# Patient Record
Sex: FEMALE | Race: WHITE | NOT HISPANIC OR LATINO | Employment: PART TIME | URBAN - METROPOLITAN AREA
[De-identification: names, ages, dates, MRNs, and addresses within clinical notes are randomized per-mention and may not be internally consistent; named-entity substitution may affect disease eponyms.]

---

## 2017-09-08 ENCOUNTER — OFFICE VISIT (OUTPATIENT)
Dept: FAMILY MEDICINE | Facility: CLINIC | Age: 22
End: 2017-09-08
Payer: COMMERCIAL

## 2017-09-08 VITALS
TEMPERATURE: 98.5 F | SYSTOLIC BLOOD PRESSURE: 130 MMHG | DIASTOLIC BLOOD PRESSURE: 85 MMHG | HEIGHT: 61 IN | BODY MASS INDEX: 40.75 KG/M2 | HEART RATE: 100 BPM | WEIGHT: 215.8 LBS

## 2017-09-08 DIAGNOSIS — Z30.8 ENCOUNTER FOR OTHER CONTRACEPTIVE MANAGEMENT: ICD-10-CM

## 2017-09-08 DIAGNOSIS — Z00.01 ENCOUNTER FOR ROUTINE ADULT MEDICAL EXAM WITH ABNORMAL FINDINGS: Primary | ICD-10-CM

## 2017-09-08 DIAGNOSIS — Z23 NEED FOR PROPHYLACTIC VACCINATION AND INOCULATION AGAINST INFLUENZA: ICD-10-CM

## 2017-09-08 DIAGNOSIS — F32.5 MAJOR DEPRESSION IN COMPLETE REMISSION (H): ICD-10-CM

## 2017-09-08 DIAGNOSIS — Z11.3 SCREEN FOR STD (SEXUALLY TRANSMITTED DISEASE): ICD-10-CM

## 2017-09-08 DIAGNOSIS — G56.01 CARPAL TUNNEL SYNDROME OF RIGHT WRIST: ICD-10-CM

## 2017-09-08 DIAGNOSIS — F32.1 MODERATE MAJOR DEPRESSION (H): ICD-10-CM

## 2017-09-08 PROCEDURE — 99213 OFFICE O/P EST LOW 20 MIN: CPT | Mod: 25 | Performed by: FAMILY MEDICINE

## 2017-09-08 PROCEDURE — 87591 N.GONORRHOEAE DNA AMP PROB: CPT | Performed by: FAMILY MEDICINE

## 2017-09-08 PROCEDURE — 90686 IIV4 VACC NO PRSV 0.5 ML IM: CPT | Performed by: FAMILY MEDICINE

## 2017-09-08 PROCEDURE — 99395 PREV VISIT EST AGE 18-39: CPT | Mod: 25 | Performed by: FAMILY MEDICINE

## 2017-09-08 PROCEDURE — 90471 IMMUNIZATION ADMIN: CPT | Performed by: FAMILY MEDICINE

## 2017-09-08 PROCEDURE — 87491 CHLMYD TRACH DNA AMP PROBE: CPT | Performed by: FAMILY MEDICINE

## 2017-09-08 RX ORDER — NORGESTIMATE AND ETHINYL ESTRADIOL 0.25-0.035
1 KIT ORAL DAILY
Qty: 84 TABLET | Refills: 3 | Status: SHIPPED | OUTPATIENT
Start: 2017-09-08 | End: 2018-11-02

## 2017-09-08 ASSESSMENT — ANXIETY QUESTIONNAIRES
5. BEING SO RESTLESS THAT IT IS HARD TO SIT STILL: NOT AT ALL
GAD7 TOTAL SCORE: 0
3. WORRYING TOO MUCH ABOUT DIFFERENT THINGS: NOT AT ALL
6. BECOMING EASILY ANNOYED OR IRRITABLE: NOT AT ALL
7. FEELING AFRAID AS IF SOMETHING AWFUL MIGHT HAPPEN: NOT AT ALL
2. NOT BEING ABLE TO STOP OR CONTROL WORRYING: NOT AT ALL
1. FEELING NERVOUS, ANXIOUS, OR ON EDGE: NOT AT ALL

## 2017-09-08 ASSESSMENT — PATIENT HEALTH QUESTIONNAIRE - PHQ9
5. POOR APPETITE OR OVEREATING: NOT AT ALL
SUM OF ALL RESPONSES TO PHQ QUESTIONS 1-9: 0

## 2017-09-08 NOTE — PROGRESS NOTES
SUBJECTIVE:   CC: Saundra Urrutia is an 21 year old woman who presents for preventive health visit.     Healthy Habits:    Do you get at least three servings of calcium containing foods daily (dairy, green leafy vegetables, etc.)? yes    Amount of exercise or daily activities, outside of work: 3 day(s) per week. Walking with dogs and moving a lot at work     Problems taking medications regularly No    Medication side effects: No    Have you had an eye exam in the past two years? no    Do you see a dentist twice per year? no    Do you have sleep apnea, excessive snoring or daytime drowsiness?yes- snoring. No daytime sleepiness     Right wrist pain -   Arm and hand fall asleep at work   Worse when working a lot   Uses wrists a lot at work   Symptoms ongoing a week   Has a brace which helps at work  Symptoms occur at night too   Right handed     Depression - stopped taking celexa a year ago  Feeling good overall   No si/hi. No depression symptoms     PHQ-2- 0  Today's PHQ-2 Score: PHQ-2 ( 1999 Pfizer) 9/13/2013 11/8/2012   Q1: Little interest or pleasure in doing things 1 0   Q2: Feeling down, depressed or hopeless 2 0   PHQ-2 Score 3 0         Abuse: Current or Past(Physical, Sexual or Emotional)- No  Do you feel safe in your environment - Yes  Social History   Substance Use Topics     Smoking status: Former Smoker     Packs/day: 0.01     Types: Cigarettes     Smokeless tobacco: Never Used      Comment: uses E-cig     Alcohol use No     The patient does not drink >3 drinks per day nor >7 drinks per week.    Reviewed orders with patient.  Reviewed health maintenance and updated orders accordingly - Yes  Labs reviewed in EPIC    Pertinent mammograms are reviewed under the imaging tab.  History of abnormal Pap smear: NO - under age 21, PAP not appropriate for age    Reviewed and updated as needed this visit by clinical staff         Reviewed and updated as needed this visit by Provider              ROS:  C: NEGATIVE  "for fever, chills, change in weight  I: NEGATIVE for worrisome rashes, moles or lesions  E: NEGATIVE for vision changes or irritation  ENT: NEGATIVE for ear, mouth and throat problems  R: NEGATIVE for significant cough or SOB  B: NEGATIVE for masses, tenderness or discharge  CV: NEGATIVE for chest pain, palpitations or peripheral edema  GI: NEGATIVE for nausea, abdominal pain, heartburn, or change in bowel habits  : NEGATIVE for unusual urinary or vaginal symptoms. Periods are regular.  M: NEGATIVE for significant arthralgias or myalgia  N: NEGATIVE for weakness, dizziness or paresthesias  P: NEGATIVE for changes in mood or affect    OBJECTIVE:   /85 (BP Location: Right arm, Patient Position: Sitting, Cuff Size: Adult Large)  Pulse 100  Temp 98.5  F (36.9  C) (Tympanic)  Ht 5' 1.25\" (1.556 m)  Wt 215 lb 12.8 oz (97.9 kg)  LMP 08/30/2017 (Approximate)  Breastfeeding? No  BMI 40.44 kg/m2  EXAM:  GENERAL: healthy, alert and no distress  EYES: Eyes grossly normal to inspection, PERRL and conjunctivae and sclerae normal  HENT: ear canals and TM's normal, nose and mouth without ulcers or lesions  NECK: no adenopathy, no asymmetry, masses, or scars and thyroid normal to palpation  RESP: lungs clear to auscultation - no rales, rhonchi or wheezes  CV: regular rate and rhythm, normal S1 S2, no S3 or S4, no murmur, click or rub, no peripheral edema and peripheral pulses strong  ABDOMEN: soft, nontender, no hepatosplenomegaly, no masses and bowel sounds normal  MS: no gross musculoskeletal defects noted, no edema  NEURO: Normal strength and tone, mentation intact and speech normal  PSYCH: mentation appears normal, affect normal/bright  Positive tinel and phalen tests on right wrist. No atrophy or weakness     ASSESSMENT/PLAN:   (Z00.01) Encounter for routine adult medical exam with abnormal findings  (primary encounter diagnosis)  Comment: We discussed exercise 30mins/day, and calcium with vitamin D at " "1200mg/day, preferably from dietary sources.  Diet, Weight loss, and Exercise were discussed as well.   Plan:     (Z30.8) Encounter for other contraceptive management  Comment: med refilled   Plan: norgestimate-ethinyl estradiol (ORTHO-CYCLEN,         SPRINTEC) 0.25-35 MG-MCG per tablet            (Z11.3) Screen for STD (sexually transmitted disease)  Comment:   Plan: NEISSERIA GONORRHOEA PCR, CHLAMYDIA TRACHOMATIS        PCR            (F32.1) Moderate major depression (H)  Comment:   Plan: DEPRESSION ACTION PLAN (DAP)            (G56.01) Carpal tunnel syndrome of right wrist  Comment: discussed diagnosis and treatment. Will start with brace at work and at night. Ice before and after work. Let me know if symptoms persist/change/worsen. The patient indicates understanding of these issues and agrees with the plan.   Plan:     (Z23) Need for prophylactic vaccination and inoculation against influenza  Comment:   Plan: FLU VAC, SPLIT VIRUS IM > 3 YO (QUADRIVALENT)         [30769], Vaccine Administration, Initial         [28112]              COUNSELING:   Reviewed preventive health counseling, as reflected in patient instructions       Regular exercise       Healthy diet/nutrition    BP Screening:   Last 3 BP Readings:    BP Readings from Last 3 Encounters:   09/08/17 130/85   09/20/15 131/78   05/22/15 121/75       The following was recommended to the patient:  Re-screen BP within a year and recommended lifestyle modifications     reports that she has quit smoking. Her smoking use included Cigarettes. She smoked 0.01 packs per day. She has never used smokeless tobacco.    Estimated body mass index is 37.81 kg/(m^2) as calculated from the following:    Height as of 5/22/15: 5' 2.25\" (1.581 m).    Weight as of 5/22/15: 208 lb 6.4 oz (94.5 kg).   Weight management plan: Discussed healthy diet and exercise guidelines and patient will follow up in 12 months in clinic to re-evaluate.    Counseling Resources:  ATP IV " Guidelines  Pooled Cohorts Equation Calculator  Breast Cancer Risk Calculator  FRAX Risk Assessment  ICSI Preventive Guidelines  Dietary Guidelines for Americans, 2010  [place] MyPlate  ASA Prophylaxis  Lung CA Screening    Karla Wilson MD  [facility]

## 2017-09-08 NOTE — MR AVS SNAPSHOT
After Visit Summary   9/8/2017    Saundra Urrutia    MRN: [id number]           Patient Information     Date Of Birth          1995        Visit Information        Provider Department      9/8/2017 2:00 PM Karla Wilson MD [facility]        Today's Diagnoses     Encounter for routine adult medical exam with abnormal findings    -  1    Encounter for other contraceptive management        Screen for STD (sexually transmitted disease)        Moderate major depression (H)        Carpal tunnel syndrome of right wrist          Care Instructions      Preventive Health Recommendations  Female Ages 18 to 25     Yearly exam:     See your health care provider every year in order to  o Review health changes.   o Discuss preventive care.    o Review your medicines if your doctor has prescribed any.      You should be tested each year for STDs (sexually transmitted diseases).       After age 20, talk to your provider about how often you should have cholesterol testing.      Starting at age 21, get a Pap test every three years. If you have an abnormal result, your doctor may have you test more often.      If you are at risk for diabetes, you should have a diabetes test (fasting glucose).     Shots:     Get a flu shot each year.     Get a tetanus shot every 10 years.     Consider getting the shot (vaccine) that prevents cervical cancer (Gardasil).    Nutrition:     Eat at least 5 servings of fruits and vegetables each day.    Eat whole-grain bread, whole-wheat pasta and brown rice instead of white grains and rice.    Talk to your provider about Calcium and Vitamin D.     Lifestyle    Exercise at least 150 minutes a week each week (30 minutes a day, 5 days a week). This will help you control your weight and prevent disease.    Limit alcohol to one drink per day.    No smoking.     Wear sunscreen to prevent skin cancer.    See your dentist every six months for an exam and cleaning.           "Follow-ups after your visit        Who to contact     Normal or non-critical lab and imaging results will be communicated to you by MyChart, letter or phone within 4 business days after the clinic has received the results. If you do not hear from us within 7 days, please contact the clinic through MyChart or phone. If you have a critical or abnormal lab result, we will notify you by phone as soon as possible.  Submit refill requests through [employer] or call your pharmacy and they will forward the refill request to us. Please allow 3 business days for your refill to be completed.          If you need to speak with a  for additional information , please call: [phone]             Additional Information About Your Visit        [employer] Information     [employer] lets you send messages to your doctor, view your test results, renew your prescriptions, schedule appointments and more. To sign up, go to [email or web address] . Click on \"Log in\" on the left side of the screen, which will take you to the Welcome page. Then click on \"Sign up Now\" on the right side of the page.     You will be asked to enter the access code listed below, as well as some personal information. Please follow the directions to create your username and password.     Your access code is: [email or web address]  Expires: 2017  2:24 PM     Your access code will  in 90 days. If you need help or a new code, please call your [facility] clinic or [phone].        Care EveryWhere ID     This is your Care EveryWhere ID. This could be used by other organizations to access your [facility] medical records  [id number]        Your Vitals Were     Pulse Temperature Height Last Period Breastfeeding? BMI (Body Mass Index)    100 98.5  F (36.9  C) (Tympanic) 5' 1.25\" (1.556 m) 2017 (Approximate) No 40.44 kg/m2       Blood Pressure from Last 3 Encounters:   17 130/85   09/20/15 131/78   05/22/15 121/75    Weight from Last 3 Encounters: "   09/08/17 215 lb 12.8 oz (97.9 kg) (98 %)*   05/22/15 208 lb 6.4 oz (94.5 kg) (98 %)*   09/11/14 197 lb (89.4 kg) (98 %)*     * Growth percentiles are based on [facility] 2-20 Years data.              We Performed the Following     CHLAMYDIA TRACHOMATIS PCR     DEPRESSION ACTION PLAN (DAP)     NEISSERIA GONORRHOEA PCR          Today's Medication Changes          These changes are accurate as of: 9/8/17  2:27 PM.  If you have any questions, ask your nurse or doctor.               These medicines have changed or have updated prescriptions.        Dose/Directions    norgestimate-ethinyl estradiol 0.25-35 MG-MCG per tablet   Commonly known as:  ORTHO-CYCLEN, SPRINTEC   This may have changed:  additional instructions   Used for:  Encounter for other contraceptive management   Changed by:  Karla Wilson MD        Dose:  1 tablet   Take 1 tablet by mouth daily   Quantity:  84 tablet   Refills:  3         Stop taking these medicines if you haven't already. Please contact your care team if you have questions.     aluminum chloride 20 % external solution   Commonly known as:  DRYSOL   Stopped by:  Karla Wilson MD           citalopram 20 MG tablet   Commonly known as:  celeXA   Stopped by:  Karla Wilson MD           triamcinolone 0.1 % paste   Commonly known as:  KENALOG   Stopped by:  Karla Wilson MD                Where to get your medicines      These medications were sent to [facility] [address] [place] [address] [address]   [address] [facility] [address]     Phone:  [phone]     norgestimate-ethinyl estradiol 0.25-35 MG-MCG per tablet                Primary Care Provider Office Phone # Fax #    Karla Wilson [phone] [address] [address] [facility] [address]        Equal Access to Services     [facility] HEATHER AH: Alana Brown, waaxda luqadaha, qaybta kaalmada adeyeimyyacuate, chari wade. So [facility]  [phone].    ATENCIÓN: Si nallely baron, tiene a escalante disposición servicios gratuitos de asistencia lingüística. Lety jerry [phone].    We comply with applicable federal civil rights laws and [place] laws. We do not discriminate on the basis of race, color, national origin, age, disability sex, sexual orientation or gender identity.            Thank you!     Thank you for choosing [facility]  for your care. Our goal is always to provide you with excellent care. Hearing back from our patients is one way we can continue to improve our services. Please take a few minutes to complete the written survey that you may receive in the mail after your visit with us. Thank you!             Your Updated Medication List - Protect others around you: Learn how to safely use, store and throw away your medicines at www.disposemymeds.org.          This list is accurate as of: 9/8/17  2:27 PM.  Always use your most recent med list.                   Brand Name Dispense Instructions for use Diagnosis    norgestimate-ethinyl estradiol 0.25-35 MG-MCG per tablet    ORTHO-CYCLEN, SPRINTEC    84 tablet    Take 1 tablet by mouth daily    Encounter for other contraceptive management

## 2017-09-08 NOTE — NURSING NOTE
"Chief Complaint   Patient presents with     Physical       Initial /85 (BP Location: Right arm, Patient Position: Sitting, Cuff Size: Adult Large)  Pulse 100  Temp 98.5  F (36.9  C) (Tympanic)  Ht 5' 1.25\" (1.556 m)  Wt 215 lb 12.8 oz (97.9 kg)  LMP 08/30/2017 (Approximate)  Breastfeeding? No  BMI 40.44 kg/m2 Estimated body mass index is 40.44 kg/(m^2) as calculated from the following:    Height as of this encounter: 5' 1.25\" (1.556 m).    Weight as of this encounter: 215 lb 12.8 oz (97.9 kg).  Medication Reconciliation: complete   Claudia Fisher LPN    "

## 2017-09-08 NOTE — LETTER
My Depression Action Plan  Name: Saundra Urrutia   Date of Birth 1995  Date: 9/8/2017    My doctor: Karla Wilson   My clinic: [facility]  [id number] [place] [id number] [phone]          GREEN    ZONE   Good Control    What it looks like:     Things are going generally well. You have normal up s and down s. You may even feel depressed from time to time, but bad moods usually last less than a day.   What you need to do:  1. Continue to care for yourself (see self care plan)  2. Check your depression survival kit and update it as needed  3. Follow your physician s recommendations including any medication.  4. Do not stop taking medication unless you consult with your physician first.           YELLOW         ZONE Getting Worse    What it looks like:     Depression is starting to interfere with your life.     It may be hard to get out of bed; you may be starting to isolate yourself from others.    Symptoms of depression are starting to last most all day and this has happened for several days.     You may have suicidal thoughts but they are not constant.   What you need to do:     1. Call your care team, your response to treatment will improve if you keep your care team informed of your progress. Yellow periods are signs an adjustment may need to be made.     2. Continue your self-care, even if you have to fake it!    3. Talk to someone in your support network    4. Open up your depression survival kit           RED    ZONE Medical Alert - Get Help    What it looks like:     Depression is seriously interfering with your life.     You may experience these or other symptoms: You can t get out of bed most days, can t work or engage in other necessary activities, you have trouble taking care of basic hygiene, or basic responsibilities, thoughts of suicide or death that will not go away, self-injurious behavior.     What you need to do:  1. Call your care team and request  a same-day appointment. If they are not available (weekends or after hours) call your local crisis line, emergency room or 911.      Electronically signed by: Claudia Fisher, September 8, 2017    Depression Self Care Plan / Survival Kit    Self-Care for Depression  Here s the deal. Your body and mind are really not as separate as most people think.  What you do and think affects how you feel and how you feel influences what you do and think. This means if you do things that people who feel good do, it will help you feel better.  Sometimes this is all it takes.  There is also a place for medication and therapy depending on how severe your depression is, so be sure to consult with your medical provider and/ or Behavioral Health Consultant if your symptoms are worsening or not improving.     In order to better manage my stress, I will:    Exercise  Get some form of exercise, every day. This will help reduce pain and release endorphins, the  feel good  chemicals in your brain. This is almost as good as taking antidepressants!  This is not the same as joining a gym and then never going! (they count on that by the way ) It can be as simple as just going for a walk or doing some gardening, anything that will get you moving.      Hygiene   Maintain good hygiene (Get out of bed in the morning, Make your bed, Brush your teeth, Take a shower, and Get dressed like you were going to work, even if you are unemployed).  If your clothes don't fit try to get ones that do.    Diet  I will strive to eat foods that are good for me, drink plenty of water, and avoid excessive sugar, caffeine, alcohol, and other mood-altering substances.  Some foods that are helpful in depression are: complex carbohydrates, B vitamins, flaxseed, fish or fish oil, fresh fruits and vegetables.    Psychotherapy  I agree to participate in Individual Therapy (if recommended).    Medication  If prescribed medications, I agree to take them.  Missing doses can  result in serious side effects.  I understand that drinking alcohol, or other illicit drug use, may cause potential side effects.  I will not stop my medication abruptly without first discussing it with my provider.    Staying Connected With Others  I will stay in touch with my friends, family members, and my primary care provider/team.    Use your imagination  Be creative.  We all have a creative side; it doesn t matter if it s oil painting, sand castles, or mud pies! This will also kick up the endorphins.    Witness Beauty  (AKA stop and smell the roses) Take a look outside, even in mid-winter. Notice colors, textures. Watch the squirrels and birds.     Service to others  Be of service to others.  There is always someone else in need.  By helping others we can  get out of ourselves  and remember the really important things.  This also provides opportunities for practicing all the other parts of the program.    Humor  Laugh and be silly!  Adjust your TV habits for less news and crime-drama and more comedy.    Control your stress  Try breathing deep, massage therapy, biofeedback, and meditation. Find time to relax each day.     My support system    Clinic Contact:  Phone number:    Contact 1:  Phone number:    Contact 2:  Phone number:    [employer]/:  Phone number:    Therapist:  Phone number:    Local crisis center:    Phone number:    Other community support:  Phone number:

## 2017-09-08 NOTE — PROGRESS NOTES
Injectable Influenza Immunization Documentation    1.  Are you sick today? (Fever of 100.5 or higher on the day of the clinic)   No    2.  Have you ever had Guillain-Montgomery Syndrome within 6 weeks of an influenza vaccionation?  No    3. Do you have a life-threatening allergy to eggs?  No    4. Do you have a life-threatening allergy to a component of the vaccine? May include antibiotics, gelatin or latex.  No     5. Have you ever had a reaction to a dose of flu vaccine that needed immediate medical attention?  No     Form completed by AJ-LPN

## 2017-09-09 ASSESSMENT — ANXIETY QUESTIONNAIRES: GAD7 TOTAL SCORE: 0

## 2017-09-10 LAB
C TRACH DNA SPEC QL NAA+PROBE: NEGATIVE
N GONORRHOEA DNA SPEC QL NAA+PROBE: NEGATIVE
SPECIMEN SOURCE: NORMAL
SPECIMEN SOURCE: NORMAL

## 2018-08-23 ENCOUNTER — OFFICE VISIT (OUTPATIENT)
Dept: FAMILY MEDICINE | Facility: CLINIC | Age: 23
End: 2018-08-23
Payer: COMMERCIAL

## 2018-08-23 VITALS
SYSTOLIC BLOOD PRESSURE: 126 MMHG | HEIGHT: 62 IN | BODY MASS INDEX: 40.48 KG/M2 | DIASTOLIC BLOOD PRESSURE: 80 MMHG | HEART RATE: 86 BPM | TEMPERATURE: 96.6 F | WEIGHT: 220 LBS

## 2018-08-23 DIAGNOSIS — G56.03 BILATERAL CARPAL TUNNEL SYNDROME: ICD-10-CM

## 2018-08-23 DIAGNOSIS — L24.9 IRRITANT CONTACT DERMATITIS, UNSPECIFIED TRIGGER: ICD-10-CM

## 2018-08-23 DIAGNOSIS — Z11.3 SCREEN FOR STD (SEXUALLY TRANSMITTED DISEASE): Primary | ICD-10-CM

## 2018-08-23 PROCEDURE — 99214 OFFICE O/P EST MOD 30 MIN: CPT | Performed by: PHYSICIAN ASSISTANT

## 2018-08-23 PROCEDURE — 87491 CHLMYD TRACH DNA AMP PROBE: CPT | Performed by: PHYSICIAN ASSISTANT

## 2018-08-23 PROCEDURE — 87591 N.GONORRHOEAE DNA AMP PROB: CPT | Performed by: PHYSICIAN ASSISTANT

## 2018-08-23 RX ORDER — FLUOCINONIDE 0.5 MG/G
OINTMENT TOPICAL
Qty: 15 G | Refills: 1 | Status: SHIPPED | OUTPATIENT
Start: 2018-08-23 | End: 2018-12-19

## 2018-08-23 ASSESSMENT — PATIENT HEALTH QUESTIONNAIRE - PHQ9: 5. POOR APPETITE OR OVEREATING: NOT AT ALL

## 2018-08-23 ASSESSMENT — ANXIETY QUESTIONNAIRES
2. NOT BEING ABLE TO STOP OR CONTROL WORRYING: NOT AT ALL
1. FEELING NERVOUS, ANXIOUS, OR ON EDGE: SEVERAL DAYS
GAD7 TOTAL SCORE: 3
6. BECOMING EASILY ANNOYED OR IRRITABLE: SEVERAL DAYS
7. FEELING AFRAID AS IF SOMETHING AWFUL MIGHT HAPPEN: NOT AT ALL
3. WORRYING TOO MUCH ABOUT DIFFERENT THINGS: SEVERAL DAYS
5. BEING SO RESTLESS THAT IT IS HARD TO SIT STILL: NOT AT ALL

## 2018-08-23 NOTE — PROGRESS NOTES
"  SUBJECTIVE:   Saundra Urrutia is a 22 year old female who presents to clinic today for the following health issues:      Rash  Onset: past month     Description:   Location: Right foot (toes)   Character: flakey, red  Itching (Pruritis): YES    Progression of Symptoms:  worsening    Accompanying Signs & Symptoms:  Fever: no   Body aches or joint pain: no   Sore throat symptoms: no   Recent cold symptoms: no     History:   Previous similar rash: no     Precipitating factors:   Exposure to similar rash: no   New exposures: None   Recent travel: no     Alleviating factors:      Therapies Tried and outcome: Hydrocortisone cream      2.Wrist brace for left wrist    Patient notes a h/o bilateral carpal tunnel and would like a wrist brace to apply at night for the L wrist as well.      Problem list and histories reviewed & adjusted, as indicated.  Additional history: Just started wearing a new work shoe.     ROS:  Constitutional, HEENT, cardiovascular, pulmonary, gi and gu systems are negative, except as otherwise noted.    OBJECTIVE:     /80 (BP Location: Right arm, Patient Position: Sitting, Cuff Size: Adult Large)  Pulse 86  Temp 96.6  F (35.9  C) (Tympanic)  Ht 5' 1.75\" (1.568 m)  Wt 220 lb (99.8 kg)  BMI 40.57 kg/m2  Body mass index is 40.57 kg/(m^2).  GENERAL: healthy, alert and no distress  SKIN: early excoriated 1 cm patch with vesicular lesions on lateral R Hallux with resolving similar on 4th digit volar surface.    Diagnostic Test Results:  none     ASSESSMENT/PLAN:   1. Irritant contact dermatitis, unspecified trigger  - fluocinonide (LIDEX) 0.05 % ointment; Apply sparingly to affected area twice daily for 14 days.  Do not apply to face.  Dispense: 15 g; Refill: 1    2. Bilateral carpal tunnel syndrome  - order for DME; Equipment being ordered: Splint  Dispense: 1 Units; Refill: 0    3. Screen for STD (sexually transmitted disease)  - Neisseria gonorrhoeae PCR  - Chlamydia trachomatis PCR    Use " medication as directed.  Use brace at night while sleeping and during work activities.  Follow up if symptoms should persist, change or worsen.  Patient amenable to this follow up plan.     Gary Gustafson PA-C  [facility]

## 2018-08-23 NOTE — LETTER
August 28, 2018      Saundra Urrutia  [address] [facility]   [place] [address]        Dear ,    We are writing to inform you of your test results.    These are normal results.  Follow up as previously recommended.       Resulted Orders   Neisseria gonorrhoeae PCR   Result Value Ref Range    Specimen Descrip Urine     N Gonorrhea PCR Negative NEG^Negative   Chlamydia trachomatis PCR   Result Value Ref Range    Specimen Description Urine     Chlamydia Trachomatis PCR Negative NEG^Negative       If you have any questions or concerns, please call the clinic at the number listed above.       Sincerely,      Gary Gustafson PA-C/sc

## 2018-08-23 NOTE — LETTER
My Depression Action Plan  Name: Saundra Urrutia   Date of Birth 1995  Date: 8/23/2018    My doctor: Karla Wilson   My clinic: [facility]  [id number] [place] [address] [phone]          GREEN    ZONE   Good Control    What it looks like:     Things are going generally well. You have normal up s and down s. You may even feel depressed from time to time, but bad moods usually last less than a day.   What you need to do:  1. Continue to care for yourself (see self care plan)  2. Check your depression survival kit and update it as needed  3. Follow your physician s recommendations including any medication.  4. Do not stop taking medication unless you consult with your physician first.           YELLOW         ZONE Getting Worse    What it looks like:     Depression is starting to interfere with your life.     It may be hard to get out of bed; you may be starting to isolate yourself from others.    Symptoms of depression are starting to last most all day and this has happened for several days.     You may have suicidal thoughts but they are not constant.   What you need to do:     1. Call your care team, your response to treatment will improve if you keep your care team informed of your progress. Yellow periods are signs an adjustment may need to be made.     2. Continue your self-care, even if you have to fake it!    3. Talk to someone in your support network    4. Open up your depression survival kit           RED    ZONE Medical Alert - Get Help    What it looks like:     Depression is seriously interfering with your life.     You may experience these or other symptoms: You can t get out of bed most days, can t work or engage in other necessary activities, you have trouble taking care of basic hygiene, or basic responsibilities, thoughts of suicide or death that will not go away, self-injurious behavior.     What you need to do:  1. Call your care team and  request a same-day appointment. If they are not available (weekends or after hours) call your local crisis line, emergency room or 911.            Depression Self Care Plan / Survival Kit    Self-Care for Depression  Here s the deal. Your body and mind are really not as separate as most people think.  What you do and think affects how you feel and how you feel influences what you do and think. This means if you do things that people who feel good do, it will help you feel better.  Sometimes this is all it takes.  There is also a place for medication and therapy depending on how severe your depression is, so be sure to consult with your medical provider and/ or Behavioral Health Consultant if your symptoms are worsening or not improving.     In order to better manage my stress, I will:    Exercise  Get some form of exercise, every day. This will help reduce pain and release endorphins, the  feel good  chemicals in your brain. This is almost as good as taking antidepressants!  This is not the same as joining a gym and then never going! (they count on that by the way ) It can be as simple as just going for a walk or doing some gardening, anything that will get you moving.      Hygiene   Maintain good hygiene (Get out of bed in the morning, Make your bed, Brush your teeth, Take a shower, and Get dressed like you were going to work, even if you are unemployed).  If your clothes don't fit try to get ones that do.    Diet  I will strive to eat foods that are good for me, drink plenty of water, and avoid excessive sugar, caffeine, alcohol, and other mood-altering substances.  Some foods that are helpful in depression are: complex carbohydrates, B vitamins, flaxseed, fish or fish oil, fresh fruits and vegetables.    Psychotherapy  I agree to participate in Individual Therapy (if recommended).    Medication  If prescribed medications, I agree to take them.  Missing doses can result in serious side effects.  I understand that  drinking alcohol, or other illicit drug use, may cause potential side effects.  I will not stop my medication abruptly without first discussing it with my provider.    Staying Connected With Others  I will stay in touch with my friends, family members, and my primary care provider/team.    Use your imagination  Be creative.  We all have a creative side; it doesn t matter if it s oil painting, sand castles, or mud pies! This will also kick up the endorphins.    Witness Beauty  (AKA stop and smell the roses) Take a look outside, even in mid-winter. Notice colors, textures. Watch the squirrels and birds.     Service to others  Be of service to others.  There is always someone else in need.  By helping others we can  get out of ourselves  and remember the really important things.  This also provides opportunities for practicing all the other parts of the program.    Humor  Laugh and be silly!  Adjust your TV habits for less news and crime-drama and more comedy.    Control your stress  Try breathing deep, massage therapy, biofeedback, and meditation. Find time to relax each day.     My support system    Clinic Contact:  Phone number:    Contact 1:  Phone number:    Contact 2:  Phone number:    Restoration/:  Phone number:    Therapist:  Phone number:    Local crisis center:    Phone number:    Other community support:  Phone number:

## 2018-08-23 NOTE — MR AVS SNAPSHOT
"              After Visit Summary   8/23/2018    Saundra Urrutia    MRN: [id number]           Patient Information     Date Of Birth          1995        Visit Information        Provider Department      8/23/2018 2:40 PM Gary Gustafson PA-C [facility]         Follow-ups after your visit        Your next 10 appointments already scheduled     Aug 23, 2018  2:40 PM CDT   Office Visit with Gary Gustafson PA-C   [facility]    [address] [facility] [address] [phone]           Bring a current list of meds and any records pertaining to this visit. For Physicals, please bring immunization records and any forms needing to be filled out. Please arrive 10 minutes early to complete paperwork.              Who to contact     Normal or non-critical lab and imaging results will be communicated to you by Veosearchhart, letter or phone within 4 business days after the clinic has received the results. If you do not hear from us within 7 days, please contact the clinic through Veosearchhart or phone. If you have a critical or abnormal lab result, we will notify you by phone as soon as possible.  Submit refill requests through [email or web address] or call your pharmacy and they will forward the refill request to us. Please allow 3 business days for your refill to be completed.          If you need to speak with a  for additional information , please call: [phone]             Additional Information About Your Visit        [email or web address] Information     [email or web address] lets you send messages to your doctor, view your test results, renew your prescriptions, schedule appointments and more. To sign up, go to [email or web address] . Click on \"Log in\" on the left side of the screen, which will take you to the Welcome page. Then click on \"Sign up Now\" on the right side of the page.     You will be asked to enter the access code listed below, as well as some personal " information. Please follow the directions to create your username and password.     Your access code is: [email or web address]  Expires: 2018  1:16 PM     Your access code will  in 90 days. If you need help or a new code, please call your [facility] clinic or [phone].        Care EveryWhere ID     This is your Care EveryWhere ID. This could be used by other organizations to access your [facility] medical records  [id number]         Blood Pressure from Last 3 Encounters:   17 130/85   09/20/15 131/78   05/22/15 121/75    Weight from Last 3 Encounters:   17 215 lb 12.8 oz (97.9 kg) (98 %)*   05/22/15 208 lb 6.4 oz (94.5 kg) (98 %)*   14 197 lb (89.4 kg) (98 %)*     * Growth percentiles are based on [facility] 2-20 Years data.              Today, you had the following     No orders found for display       Primary Care Provider Office Phone # Fax #    Karla Wilson [phone] [address] [facility] [address]        Equal Access to Services     CARMEN ROMERO AH: Hadii sulema englando Soedie, waaxda luqadaha, qaybta kaalmada adeegyada, chari wade. So [facility] [phone].    ATENCIÓN: Si habla español, tiene a escalante disposición servicios gratuitos de asistencia lingüística. Llame al [phone].    We comply with applicable federal civil rights laws and [place] laws. We do not discriminate on the basis of race, color, national origin, age, disability, sex, sexual orientation, or gender identity.            Thank you!     Thank you for choosing [facility]  for your care. Our goal is always to provide you with excellent care. Hearing back from our patients is one way we can continue to improve our services. Please take a few minutes to complete the written survey that you may receive in the mail after your visit with us. Thank you!             Your Updated Medication List - Protect others around you: Learn how to safely use, store and  throw away your medicines at www.disposemymeds.org.          This list is accurate as of 8/23/18  1:16 PM.  Always use your most recent med list.                   Brand Name Dispense Instructions for use Diagnosis    norgestimate-ethinyl estradiol 0.25-35 MG-MCG per tablet    ORTHO-CYCLEN, SPRINTEC    84 tablet    Take 1 tablet by mouth daily    Encounter for other contraceptive management

## 2018-08-24 ASSESSMENT — ANXIETY QUESTIONNAIRES: GAD7 TOTAL SCORE: 3

## 2018-08-24 ASSESSMENT — PATIENT HEALTH QUESTIONNAIRE - PHQ9: SUM OF ALL RESPONSES TO PHQ QUESTIONS 1-9: 4

## 2018-11-02 ENCOUNTER — OFFICE VISIT (OUTPATIENT)
Dept: FAMILY MEDICINE | Facility: CLINIC | Age: 23
End: 2018-11-02
Payer: COMMERCIAL

## 2018-11-02 VITALS
SYSTOLIC BLOOD PRESSURE: 119 MMHG | HEART RATE: 86 BPM | HEIGHT: 62 IN | TEMPERATURE: 97.5 F | WEIGHT: 226 LBS | DIASTOLIC BLOOD PRESSURE: 72 MMHG | BODY MASS INDEX: 41.59 KG/M2

## 2018-11-02 DIAGNOSIS — F33.1 MAJOR DEPRESSIVE DISORDER, RECURRENT EPISODE, MODERATE (H): Primary | ICD-10-CM

## 2018-11-02 DIAGNOSIS — Z72.89 SELF-INJURIOUS BEHAVIOR: ICD-10-CM

## 2018-11-02 PROBLEM — F32.5 MAJOR DEPRESSION IN COMPLETE REMISSION (H): Status: RESOLVED | Noted: 2017-09-08 | Resolved: 2018-11-02

## 2018-11-02 PROCEDURE — 99214 OFFICE O/P EST MOD 30 MIN: CPT | Performed by: FAMILY MEDICINE

## 2018-11-02 RX ORDER — SERTRALINE HYDROCHLORIDE 25 MG/1
25 TABLET, FILM COATED ORAL DAILY
Qty: 60 TABLET | Refills: 1 | Status: SHIPPED | OUTPATIENT
Start: 2018-11-02 | End: 2019-02-27

## 2018-11-02 ASSESSMENT — ANXIETY QUESTIONNAIRES
1. FEELING NERVOUS, ANXIOUS, OR ON EDGE: SEVERAL DAYS
7. FEELING AFRAID AS IF SOMETHING AWFUL MIGHT HAPPEN: NOT AT ALL
GAD7 TOTAL SCORE: 9
2. NOT BEING ABLE TO STOP OR CONTROL WORRYING: SEVERAL DAYS
5. BEING SO RESTLESS THAT IT IS HARD TO SIT STILL: MORE THAN HALF THE DAYS
3. WORRYING TOO MUCH ABOUT DIFFERENT THINGS: MORE THAN HALF THE DAYS
6. BECOMING EASILY ANNOYED OR IRRITABLE: MORE THAN HALF THE DAYS

## 2018-11-02 ASSESSMENT — PATIENT HEALTH QUESTIONNAIRE - PHQ9
5. POOR APPETITE OR OVEREATING: SEVERAL DAYS
SUM OF ALL RESPONSES TO PHQ QUESTIONS 1-9: 12

## 2018-11-02 NOTE — MR AVS SNAPSHOT
After Visit Summary   11/2/2018    Saundra Urrutia    MRN: [id number]           Patient Information     Date Of Birth          1995        Visit Information        Provider Department      11/2/2018 2:30 PM Karla Wilson MD [facility]        Today's Diagnoses     Major depressive disorder, recurrent episode, moderate (H)    -  1    Self-injurious behavior           Follow-ups after your visit        Additional Services     MENTAL HEALTH REFERRAL  - Adult; Outpatient Treatment; Individual/Couples/Family/Group Therapy/Health Psychology; G: [facility] [phone]; We will contact you to schedule the appointment or please call with any questions       All scheduling is subject to the client's specific insurance plan & benefits, provider/location availability, and provider clinical specialities.  Please arrive 15 minutes early for your first appointment and bring your completed paperwork.    Please be aware that coverage of these services is subject to the terms and limitations of your health insurance plan.  Call member services at your health plan with any benefit or coverage questions.                            Who to contact     Normal or non-critical lab and imaging results will be communicated to you by Truckilyhart, letter or phone within 4 business days after the clinic has received the results. If you do not hear from us within 7 days, please contact the clinic through Exploretript or phone. If you have a critical or abnormal lab result, we will notify you by phone as soon as possible.  Submit refill requests through Phytel or call your pharmacy and they will forward the refill request to us. Please allow 3 business days for your refill to be completed.          If you need to speak with a  for additional information , please call: [phone]             Additional Information About Your Visit        Phytel Information     Phytel gives you  "secure access to your electronic health record. If you see a primary care provider, you can also send messages to your care team and make appointments. If you have questions, please call your primary care clinic.  If you do not have a primary care provider, please call [phone] and they will assist you.        Care EveryWhere ID     This is your Care EveryWhere ID. This could be used by other organizations to access your [facility] medical records  [id number]        Your Vitals Were     Pulse Temperature Height BMI (Body Mass Index)          86 97.5  F (36.4  C) 5' 1.75\" (1.568 m) 41.67 kg/m2         Blood Pressure from Last 3 Encounters:   11/02/18 119/72   08/23/18 126/80   09/08/17 130/85    Weight from Last 3 Encounters:   11/02/18 226 lb (102.5 kg)   08/23/18 220 lb (99.8 kg)   09/08/17 215 lb 12.8 oz (97.9 kg) (98 %)*     * Growth percentiles are based on [facility] 2-20 Years data.              We Performed the Following     MENTAL HEALTH REFERRAL  - Adult; Outpatient Treatment; Individual/Couples/Family/Group Therapy/Health Psychology; G: [facility] [phone]; We will contact you to schedule the appointment or please call with any questions          Today's Medication Changes          These changes are accurate as of 11/2/18  2:57 PM.  If you have any questions, ask your nurse or doctor.               Start taking these medicines.        Dose/Directions    sertraline 25 MG tablet   Commonly known as:  ZOLOFT   Used for:  Major depressive disorder, recurrent episode, moderate (H), Self-injurious behavior   Started by:  Karla Wilson MD        Dose:  25 mg   Take 1 tablet (25 mg) by mouth daily Take 1/2 tablet (25 mg) for 3-5 days, then increase to 1 tablet ( 50mg) for 3-5 days, then increase to 1.5 tablets ( 75mg) for 3-5 days, then increase to 2 tablets ( 100mg) and stay at this dose   Quantity:  60 tablet   Refills:  1            Where to get your medicines      Some of these " will need a paper prescription and others can be bought over the counter.  Ask your nurse if you have questions.     Bring a paper prescription for each of these medications     sertraline 25 MG tablet                Primary Care Provider Office Phone # Fax #    Karla Wilson [phone] [address] [facility] [address]        Equal Access to Services     [facility] : Hadii aad ku hadasho Soomaali, waaxda luqadaha, qaybta kaalmada adeegyada, waxay idiin hayaan adeeg khlucysae lakrysta . So [facility] [phone].    ATENCIÓN: Si habla español, tiene a escalante disposición servicios gratuitos de asistencia lingüística. Llame al [phone].    We comply with applicable federal civil rights laws and [place] laws. We do not discriminate on the basis of race, color, national origin, age, disability, sex, sexual orientation, or gender identity.            Thank you!     Thank you for choosing [facility]  for your care. Our goal is always to provide you with excellent care. Hearing back from our patients is one way we can continue to improve our services. Please take a few minutes to complete the written survey that you may receive in the mail after your visit with us. Thank you!             Your Updated Medication List - Protect others around you: Learn how to safely use, store and throw away your medicines at www.disposemymeds.org.          This list is accurate as of 11/2/18  2:57 PM.  Always use your most recent med list.                   Brand Name Dispense Instructions for use Diagnosis    fluocinonide 0.05 % ointment    LIDEX    15 g    Apply sparingly to affected area twice daily for 14 days.  Do not apply to face.    Irritant contact dermatitis, unspecified trigger       norgestimate-ethinyl estradiol 0.25-35 MG-MCG per tablet    ORTHO-CYCLEN, SPRINTEC    84 tablet    Take 1 tablet by mouth daily    Encounter for other contraceptive management       order for DME     1 Units     Equipment being ordered: Splint    Bilateral carpal tunnel syndrome       sertraline 25 MG tablet    ZOLOFT    60 tablet    Take 1 tablet (25 mg) by mouth daily Take 1/2 tablet (25 mg) for 3-5 days, then increase to 1 tablet ( 50mg) for 3-5 days, then increase to 1.5 tablets ( 75mg) for 3-5 days, then increase to 2 tablets ( 100mg) and stay at this dose    Major depressive disorder, recurrent episode, moderate (H), Self-injurious behavior

## 2018-11-02 NOTE — PROGRESS NOTES
"    SUBJECTIVE:   Saundra Urrutia is a 23 year old female who presents to clinic today for the following health issues:      Patient is here to discuss medication for depression.   Patient said she used to be on medication for depression and anxiety.   Has tried both prozac and zoloft     Symptoms :  Sad and hopeless for 3/7 days a week   Wants to stay in bed    Working full time at [employer]    Some si   Doesn't want to hurt family - this keeps her from acting   No plan     History of sib - cut a week ago on her arm    Off birth control - trying to get pregnant   Checking ovulation status  Wondering why she can't get pregnant - is with one male partner    Review of systems:  No headache  No cp/sob/cough  No n/v   No dc      Problem list and histories reviewed & adjusted, as indicated.  Additional history: as documented    Patient Active Problem List   Diagnosis     HL (hearing loss)     Self-injurious behavior     Hyperhidrosis     Obesity     Bilateral carpal tunnel syndrome     Current Outpatient Prescriptions   Medication     fluocinonide (LIDEX) 0.05 % ointment               order for DME     [     No current facility-administered medications for this visit.         Allergies   Allergen Reactions     Sulfa Drugs        /72 (BP Location: Right arm, Patient Position: Sitting, Cuff Size: Adult Large)  Pulse 86  Temp 97.5  F (36.4  C)  Ht 5' 1.75\" (1.568 m)  Wt 226 lb (102.5 kg)  BMI 41.67 kg/m2    GENERAL - Pt is alert and oriented in no acute distress.  Affect is appropriate. Good eye contact.  PSYCH - Pt makes good eye contact, is clean and well-dressed. Oriented to person,place,and time. Cooperative. No speech abnormalities. No psychomotor agitation or retardation.  Thought process was normal and thought content was free of suicidal/homicidal ideation, obsessions, and delusions. Insight and judgement were poor      Assessment/Plan -    (F33.1) Major depressive disorder, recurrent episode, moderate " (H)  (primary encounter diagnosis)  Comment: Discussed diagnosis - MDD . Discussed medication options and risks/benefits/side effects. Discussed non-pharmacological adjuncts to therapy including tobacco and alcohol cessation, regular sleep, regular exercise, healthy diet, stress reduction, and counseling.  She is interested in trying a medication today and we chose zoloft . She agrees to counseling.  she is advised to seek care immediately for thoughts of suicide or self harm and pt is to call for questions, issues or any concerns. RTC in 4 weeks  for recheck or sooner prn. The patient indicates understanding of these issues and agrees with the plan.   Plan: MENTAL HEALTH REFERRAL  - Adult; Outpatient         Treatment; Individual/Couples/Family/Group         Therapy/Health Psychology; [facility] of hospitals [facility] the [place]: [facility] [phone]; We will         contact you to schedule the appointment or         please call with any questions, sertraline         (ZOLOFT) 25 MG tablet            (F48.9) Self-injurious behavior  Comment: contracts for safety. Will call to talk with someone rather than cut.   Plan: MENTAL HEALTH REFERRAL  - Adult; Outpatient         Treatment; Individual/Couples/Family/Group         Therapy/Health Psychology; [facility] of hospitals [place] the [place]: [facility] [phone]; We will         contact you to schedule the appointment or         please call with any questions, sertraline         (ZOLOFT) 25 MG tablet            Discussed that she is trying to get pregnant. Recommended she wait until we get her mental health under control. Hard to care for an infant with depression/sib.  She does NOT want to restart OCPs.     DORA Wilson MD

## 2018-11-03 ASSESSMENT — ANXIETY QUESTIONNAIRES: GAD7 TOTAL SCORE: 9

## 2018-12-01 ENCOUNTER — HEALTH MAINTENANCE LETTER (OUTPATIENT)
Age: 23
End: 2018-12-01

## 2018-12-17 ENCOUNTER — TELEPHONE (OUTPATIENT)
Dept: OBGYN | Facility: CLINIC | Age: 23
End: 2018-12-17

## 2018-12-17 NOTE — TELEPHONE ENCOUNTER
Return call to patient.    Please review and advise.    OK for patient to remain on Sertraline as it is effective for her?  Patient currently pregnant in 1st trimester.  LMP 11/20/18    Thank you.    Jonna Amaya   Ob/Gyn Clinic  RN

## 2018-12-17 NOTE — TELEPHONE ENCOUNTER
Patient advised Sertraline is OK to continue on during her pregnancy.    See MyChart message from Dr. Hansen.    Jonna Amaya   Ob/Gyn Clinic  RN

## 2018-12-17 NOTE — TELEPHONE ENCOUNTER
Reason for Call:  Other     Detailed comments: LMP:  11/20/2018    Pt takes 100 mg of sertraline a day - She wants to know if this is okay to take while pregnant, should she decrease the dose - not sure what she should do. First OB appt scheduled for 01/30/2019 - Please advise    PHARMACY:  [facility] [place]    Phone Number Patient can be reached at: Home number on file [phone] (home)    Best Time: Any    Can we leave a detailed message on this number? YES    Call taken on 12/17/2018 at 8:02 AM by Denise Behrendt

## 2018-12-19 ENCOUNTER — PRENATAL OFFICE VISIT (OUTPATIENT)
Dept: OBGYN | Facility: CLINIC | Age: 23
End: 2018-12-19

## 2018-12-19 ENCOUNTER — APPOINTMENT (OUTPATIENT)
Dept: OBGYN | Facility: CLINIC | Age: 23
End: 2018-12-19
Payer: COMMERCIAL

## 2018-12-19 DIAGNOSIS — Z34.00 PRENATAL CARE, FIRST PREGNANCY: Primary | ICD-10-CM

## 2018-12-19 PROCEDURE — 99207 ZZC NO CHARGE NURSE ONLY: CPT | Performed by: OBSTETRICS & GYNECOLOGY

## 2018-12-19 RX ORDER — PRENATAL VIT/IRON FUM/FOLIC AC 27MG-0.8MG
1 TABLET ORAL EVERY EVENING
COMMUNITY
Start: 2018-12-12 | End: 2020-10-03

## 2018-12-20 ENCOUNTER — MYC MEDICAL ADVICE (OUTPATIENT)
Dept: FAMILY MEDICINE | Facility: CLINIC | Age: 23
End: 2018-12-20

## 2018-12-26 ENCOUNTER — MYC MEDICAL ADVICE (OUTPATIENT)
Dept: FAMILY MEDICINE | Facility: CLINIC | Age: 23
End: 2018-12-26

## 2019-01-05 ENCOUNTER — HOSPITAL ENCOUNTER (EMERGENCY)
Facility: CLINIC | Age: 24
Discharge: HOME | End: 2019-01-05
Attending: EMERGENCY MEDICINE | Admitting: EMERGENCY MEDICINE
Payer: COMMERCIAL

## 2019-01-05 VITALS
SYSTOLIC BLOOD PRESSURE: 153 MMHG | TEMPERATURE: 97.9 F | DIASTOLIC BLOOD PRESSURE: 91 MMHG | OXYGEN SATURATION: 97 % | WEIGHT: 210 LBS | BODY MASS INDEX: 38.72 KG/M2 | RESPIRATION RATE: 16 BRPM

## 2019-01-05 DIAGNOSIS — Z3A.01 LESS THAN 8 WEEKS GESTATION OF PREGNANCY: ICD-10-CM

## 2019-01-05 DIAGNOSIS — R11.2 NON-INTRACTABLE VOMITING WITH NAUSEA, UNSPECIFIED VOMITING TYPE: ICD-10-CM

## 2019-01-05 PROCEDURE — 99284 EMERGENCY DEPT VISIT MOD MDM: CPT | Mod: Z6 | Performed by: EMERGENCY MEDICINE

## 2019-01-05 PROCEDURE — 25000131 ZZH RX MED GY IP 250 OP 636 PS 637: Performed by: EMERGENCY MEDICINE

## 2019-01-05 PROCEDURE — 99283 EMERGENCY DEPT VISIT LOW MDM: CPT | Performed by: EMERGENCY MEDICINE

## 2019-01-05 RX ORDER — ONDANSETRON 4 MG/1
4 TABLET, ORALLY DISINTEGRATING ORAL EVERY 6 HOURS PRN
Qty: 15 TABLET | Refills: 0 | Status: SHIPPED | OUTPATIENT
Start: 2019-01-05 | End: 2019-05-30

## 2019-01-05 RX ORDER — ONDANSETRON 4 MG/1
4 TABLET, ORALLY DISINTEGRATING ORAL ONCE
Status: COMPLETED | OUTPATIENT
Start: 2019-01-05 | End: 2019-01-05

## 2019-01-05 RX ADMIN — ONDANSETRON 4 MG: 4 TABLET, ORALLY DISINTEGRATING ORAL at 00:45

## 2019-01-05 NOTE — ED PROVIDER NOTES
Chief Complaint:   Chief Complaint   Patient presents with     Vomiting     since 6 pm         HPI:   Saundra Urrutia is a 23 year old female who is  with last menstrual period of 2018 making patient 6 weeks 4 days gestation by last menstrual period, presenting to the emergency department with approximately 6-hour history of nausea, and vomiting.  No other ill contacts.  Had been eating and drinking normally this morning.  No recent traveling.  She is taking Zoloft, and prenatal vitamins as her only medications.  She had normal bowel movement 40 minutes prior to arrival, with no diarrhea which has been noted.  Patient had slight red streaks in the last episode of emesis.  Mild crampy upper abdominal pains, with no lower abdominal pains, no vaginal bleeding, discharge, or other concerns.  Denies urinary symptoms.       Medications:   Current Outpatient Medications   Medication Sig Dispense Refill     ondansetron (ZOFRAN ODT) 4 MG ODT tab Take 1 tablet (4 mg) by mouth every 6 hours as needed for nausea or vomiting 15 tablet 0     Prenatal Vit-Fe Fumarate-FA (PRENATAL MULTIVITAMIN W/IRON) 27-0.8 MG tablet Take 1 tablet by mouth daily       sertraline (ZOLOFT) 25 MG tablet Take 1 tablet (25 mg) by mouth daily Take 1/2 tablet (25 mg) for 3-5 days, then increase to 1 tablet ( 50mg) for 3-5 days, then increase to 1.5 tablets ( 75mg) for 3-5 days, then increase to 2 tablets ( 100mg) and stay at this dose 60 tablet 1     order for DME Equipment being ordered: Splint 1 Units 0       Allergies:   Allergies   Allergen Reactions     Sulfa Drugs      Red eye       Medications updated and reviewed.  Past, family and surgical history is updated and reviewed in the record.     Review of Systems:  GI: see HPI  Immune: see HPI  Heme: see HPI  All other systems reviewed and are negative except as above in HPI      Physical Exam:   BP (!) 153/91   Temp 97.9  F (36.6  C) (Oral)   Resp 16   Wt 95.3 kg (210 lb)   LMP  11/20/2018   SpO2 98%   BMI 38.72 kg/m     General: healthy, alert and no distress  Head: Normocephalic. No masses, lesions, tenderness or abnormalities  Chest/Pulmonary: negative,  . Good diaphragmatic excursion. Lungs clear  Cardiovascular: RRR normal S1S2 without  Murmurs   Abdomen: Abdomen soft, non-tender. BS normal. No masses, organomegaly    Medical Decision Making:  Vitals are Normal. Abdominal exam is nontender to palpation abdomen. Given the lack of blood in the stools or fever, this is less likely to be bacterial cause of gastroenteritis or inflammatory bowel disease.  Given the lack of localized abdominal pain, this is less likely to be appendicitis or other surgical process.  Symptoms are most consistent with viral gastroenteritis.    Assessment:  1. Non-intractable vomiting with nausea, unspecified vomiting type    2. Less than 8 weeks gestation of pregnancy          Plan:   IVF not given  Antiemetic given Orally.    SHe was able to tolerate oral fluids in the ED.  Rx:  zofran    small amounts clear fluids frequently, dilute gatorade, soups, juices, water and advance diet as tolerated  SHe will follow up with his PCP within 24 hours if not improving or will return to the ER with inability to keep down fluids, blood in stool/emesis, fevers, abdominal pain or any other concerning symptoms.     Condition on disposition: Stable       Jus Du MD  01/05/19 0153

## 2019-01-05 NOTE — ED AVS SNAPSHOT
[facility] Emergency Department  [address] [address]  Phone:  [phone]  Fax:  [phone]                                    Saundra Urrutia   MRN: [id number]    Department:  [facility] Emergency Department   Date of Visit:  1/5/2019           After Visit Summary Signature Page    I have received my discharge instructions, and my questions have been answered. I have discussed any challenges I see with this plan with the nurse or doctor.    ..........................................................................................................................................  Patient/Patient Representative Signature      ..........................................................................................................................................  Patient Representative Print Name and Relationship to Patient    ..................................................               ................................................  Date                                   Time    ..........................................................................................................................................  Reviewed by Signature/Title    ...................................................              ..............................................  Date                                               Time          22EPIC Rev 08/18       
Anel Rivera(Resident)

## 2019-01-30 ENCOUNTER — PRENATAL OFFICE VISIT (OUTPATIENT)
Dept: OBGYN | Facility: CLINIC | Age: 24
End: 2019-01-30
Payer: COMMERCIAL

## 2019-01-30 VITALS
HEIGHT: 62 IN | HEART RATE: 98 BPM | WEIGHT: 216.4 LBS | RESPIRATION RATE: 16 BRPM | BODY MASS INDEX: 39.82 KG/M2 | DIASTOLIC BLOOD PRESSURE: 73 MMHG | TEMPERATURE: 98.4 F | SYSTOLIC BLOOD PRESSURE: 138 MMHG

## 2019-01-30 DIAGNOSIS — Z34.01 ENCOUNTER FOR SUPERVISION OF NORMAL FIRST PREGNANCY IN FIRST TRIMESTER: ICD-10-CM

## 2019-01-30 DIAGNOSIS — Z12.4 SCREENING FOR MALIGNANT NEOPLASM OF CERVIX: ICD-10-CM

## 2019-01-30 LAB
ABO + RH BLD: NORMAL
ABO + RH BLD: NORMAL
ALBUMIN UR-MCNC: NEGATIVE MG/DL
APPEARANCE UR: CLEAR
BILIRUB UR QL STRIP: NEGATIVE
BLD GP AB SCN SERPL QL: NORMAL
BLOOD BANK CMNT PATIENT-IMP: NORMAL
COLOR UR AUTO: YELLOW
ERYTHROCYTE [DISTWIDTH] IN BLOOD BY AUTOMATED COUNT: 12.8 % (ref 10–15)
GLUCOSE UR STRIP-MCNC: NEGATIVE MG/DL
HBV SURFACE AG SERPL QL IA: NONREACTIVE
HCT VFR BLD AUTO: 37.9 % (ref 35–47)
HGB BLD-MCNC: 12.7 G/DL (ref 11.7–15.7)
HGB UR QL STRIP: NEGATIVE
HIV 1+2 AB+HIV1 P24 AG SERPL QL IA: NONREACTIVE
KETONES UR STRIP-MCNC: NEGATIVE MG/DL
LEUKOCYTE ESTERASE UR QL STRIP: NEGATIVE
MCH RBC QN AUTO: 30.5 PG (ref 26.5–33)
MCHC RBC AUTO-ENTMCNC: 33.5 G/DL (ref 31.5–36.5)
MCV RBC AUTO: 91 FL (ref 78–100)
NITRATE UR QL: NEGATIVE
PH UR STRIP: 6 PH (ref 5–7)
PLATELET # BLD AUTO: 407 10E9/L (ref 150–450)
RBC # BLD AUTO: 4.16 10E12/L (ref 3.8–5.2)
SOURCE: NORMAL
SP GR UR STRIP: >1.03 (ref 1–1.03)
SPECIMEN EXP DATE BLD: NORMAL
UROBILINOGEN UR STRIP-ACNC: 0.2 EU/DL (ref 0.2–1)
WBC # BLD AUTO: 13.7 10E9/L (ref 4–11)

## 2019-01-30 PROCEDURE — 2894A US OB TRANSVAGINAL ONLY: CPT | Performed by: OBSTETRICS & GYNECOLOGY

## 2019-01-30 PROCEDURE — 36415 COLL VENOUS BLD VENIPUNCTURE: CPT | Performed by: OBSTETRICS & GYNECOLOGY

## 2019-01-30 PROCEDURE — 85027 COMPLETE CBC AUTOMATED: CPT | Performed by: OBSTETRICS & GYNECOLOGY

## 2019-01-30 PROCEDURE — 86900 BLOOD TYPING SEROLOGIC ABO: CPT | Performed by: OBSTETRICS & GYNECOLOGY

## 2019-01-30 PROCEDURE — G0145 SCR C/V CYTO,THINLAYER,RESCR: HCPCS | Performed by: OBSTETRICS & GYNECOLOGY

## 2019-01-30 PROCEDURE — 87491 CHLMYD TRACH DNA AMP PROBE: CPT | Performed by: OBSTETRICS & GYNECOLOGY

## 2019-01-30 PROCEDURE — 86850 RBC ANTIBODY SCREEN: CPT | Performed by: OBSTETRICS & GYNECOLOGY

## 2019-01-30 PROCEDURE — 81003 URINALYSIS AUTO W/O SCOPE: CPT | Performed by: OBSTETRICS & GYNECOLOGY

## 2019-01-30 PROCEDURE — 86762 RUBELLA ANTIBODY: CPT | Performed by: OBSTETRICS & GYNECOLOGY

## 2019-01-30 PROCEDURE — 86780 TREPONEMA PALLIDUM: CPT | Performed by: OBSTETRICS & GYNECOLOGY

## 2019-01-30 PROCEDURE — 87389 HIV-1 AG W/HIV-1&-2 AB AG IA: CPT | Performed by: OBSTETRICS & GYNECOLOGY

## 2019-01-30 PROCEDURE — 99207 ZZC FIRST OB VISIT: CPT | Performed by: OBSTETRICS & GYNECOLOGY

## 2019-01-30 PROCEDURE — 87340 HEPATITIS B SURFACE AG IA: CPT | Performed by: OBSTETRICS & GYNECOLOGY

## 2019-01-30 PROCEDURE — 87591 N.GONORRHOEAE DNA AMP PROB: CPT | Performed by: OBSTETRICS & GYNECOLOGY

## 2019-01-30 PROCEDURE — 87086 URINE CULTURE/COLONY COUNT: CPT | Performed by: OBSTETRICS & GYNECOLOGY

## 2019-01-30 PROCEDURE — 86901 BLOOD TYPING SEROLOGIC RH(D): CPT | Performed by: OBSTETRICS & GYNECOLOGY

## 2019-01-30 ASSESSMENT — MIFFLIN-ST. JEOR: SCORE: 1699.83

## 2019-01-30 NOTE — PROGRESS NOTES
Saundra Urrutia  is a 23 year old  year old single  G 1 P 0 who presents to the clinic for an new ob visit.    Estimated Date of Delivery: Aug 27, 2019  is calculated from Patient's last menstrual period was Patient's last menstrual period was 11/20/2018.   She has not had bleeding since her LMP.   She has had mild nausea. Weigh loss has not occurred.   This was a planned pregnancy.   FOB is involved,  Christopher - who I delivered 21 years ago   OTHER CONCERNS:   INFECTION HISTORY  HIV: no  Hepatitis B: no  Hepatitis C: no  Syphilis:  no  Tuberculosis: no   PPD- no  Herpes self: no  Herpes partner:  no  Chlamydia:  no  Gonorrhea:  no  HPV: no  BV:  no  Trichomonis:  no  Chicken Pox:  Vaccinated  ====================================================  PERSONAL/SOCIAL HISTORY  Lives with partner.  Employment: Full time.  Her job involves moderate activity .  Additional items: None  =====================================================   REVIEW OF SYSTEMS  CONSTITUTIONAL: NEGATIVE for fever, chills  INTEGUMENTARY/SKIN: NEGATIVE for worrisome rashes, moles or lesions  EYES: NEGATIVE for vision changes   ENT/MOUTH: NEGATIVE for ear, mouth and throat problems  RESP: NEGATIVE for significant cough or SOB  BREAST: NEGATIVE for masses, tenderness or discharge  CV: NEGATIVE for chest pain, palpitations   GI: NEGATIVE for nausea, abdominal pain, heartburn, or change in bowel habits  : NEGATIVE for frequency, dysuria, or hematuria  MUSCULOSKELETAL: NEGATIVE for significant arthralgias or myalgia  NEURO: NEGATIVE for weakness, dizziness or paresthesias or headache  ENDOCRINE: NEGATIVE for temperature intolerance, skin/hair changes  HEME: NEGATIVE for bleeding problems  PSYCHIATRIC: NEGATIVE for changes in mood or affect  C: NEGATIVE for fever, chills  E: NEGATIVE for vision changes   R: NEGATIVE for significant cough or SOB  M: NEGATIVE for significant arthralgias or myalgia  N: NEGATIVE for weakness, dizziness or paresthesias or  "headache  ====================================================  PHYSICAL EXAM: Vitals: /73   Pulse 98   Temp 98.4  F (36.9  C)   Resp 16   Ht 1.575 m (5' 2\")   Wt 98.2 kg (216 lb 6.4 oz)   LMP 11/20/2018   BMI 39.58 kg/m    BMI= Body mass index is 39.58 kg/m .      RECOMMENDED WEIGHT GAIN: 15-25 lbs.  GENERAL:  Pleasant pregnant female, alert, well groomed.  SKIN:  Warm and dry, without lesions or rashes  HEAD: Symmetrical features.  EYES:  PERRLA,   MOUTH:  Buccal mucosa pink, moist without lesions.    NECK:  Thyroid without enlargement and nodules.  Lymph nodes not palpable.   LUNGS:  Clear to auscultation.  BREAST:  Symmetrical.  No dominant, fixed or suspicious masses are noted.  No skin or nipple changes or axillary nodes.  Self exam is taught and encouraged.  Nipples everted.      HEART:  RRR without murmur.  ABDOMEN: Soft without masses , tenderness or organomegaly.  No CVA tenderness. No scars noted..   MUSCULOSKELETAL:  Full range of motion  EXTREMITIES:  No edema. No significant varicosities.   GENITALIA:  BUS WNL, no lesions noted   VAGINA:  Pink, normal rugae and discharge normal and physiologic,   CERVIX:  smooth, without discharge or CMT and nulliparous os,   firm/ closed 4 cm long.  UTERUS: Anteverted, nontender 10 weeks in size.  ADNEXA: Without masses or tenderness  PELVIS:  Arch; wide . Sacrum; deep. Spines;blunt.  Side walls; straight. Type; gynecoid  PELVIS:   Adequate, Pelvis proven to -pelvis not tested.  RECTAL:  Normal appearance.  Digital exam deferred.  WET PREP:Not done  gonorrhea  =========================================  ASSESSMENT:  (Z12.4) Screening for malignant neoplasm of cervix  Comment: Estimated Date of Delivery: Aug 21, 2019    Plan: ABO/Rh type and screen, CBC with platelets,         Neisseria gonorrhoeae PCR, Chlamydia         trachomatis PCR, Hepatitis B surface antigen,         HIV Antigen Antibody Combo, Rubella Antibody         IgG Quantitative, US OB " Transvaginal Only,         Treponema Abs w Reflex to RPR and Titer, *UA         reflex to Microscopic, Urine Culture Aerobic         Bacterial, Pap imaged thin layer screen only -         recommended age 21 - 24 years            (Z34.01) Encounter for supervision of normal first pregnancy in first trimester  Comment:   Plan: ABO/Rh type and screen, CBC with platelets,         Neisseria gonorrhoeae PCR, Chlamydia         trachomatis PCR, Hepatitis B surface antigen,         HIV Antigen Antibody Combo, Rubella Antibody         IgG Quantitative, US OB Transvaginal Only,         Treponema Abs w Reflex to RPR and Titer, *UA         reflex to Microscopic, Urine Culture Aerobic         Bacterial, Pap imaged thin layer screen only -         recommended age 21 - 24 years              PREGNANCY AT RISK? no  ==========================================      PLAN:  Discussed physician coverage, tertiary support, diet, exercise, weight gain, schedule of visits, routine and indicated ultrasounds, childbirth education and antepartum testing for certain birth defects.  Encouraged patient to review contents of Prenatal Breastfeeding Education Toolkit. Offered opportunity to answer questions regarding the importance of skin to skin contact, early initiation of exclusive breastfeeding for the first six months and rooming in while in the hospital.  Discussed CDC travel advisory for Zika virus.  Syphilis is a sexually transmitted disease that can cause birth defects in the babies of untreated mothers. Every pregnant patient is tested for syphilis early in each pregnancy as part of the routine lab work. The [place] Department of [facility] has seen an increase in the rate of syphilis in [place]. The [facility] now recommends testing for syphilis 3 times during a pregnancy, the new prenatal visit, 28 weeks and when admitted for delivery    Instructed on use of triage nurse line and contacting the on call Birthplace after hours for an urgent need  such as fever, vagina bleeding, bladder or vaginal infection, rupture of membranes,  or term labor.    Discussed the indications, uses for and false positives for quad screen, nuchal translucency and fetal survey ultrasound at 18-20 weeks gestation. Will inform us at the next visit if she wished to avail herself of these screens.  Instructed on best evidence for: weight gain for her BMI for pregnancy; healthy diet and foods to avoid; exercise and activity during pregnancy;avoiding exposure to toxoplasmosis; and maintenance of a generally healthy lifestyle.   Discussed the harms, benefits, side effects and alternative therapies for current prescribed and OTC medications.      Discussed physician coverage, tertiary support, diet, exercise, weight gain, schedule of visits, routine and indicated ultrasounds, childbirth education and antepartum testing for certain birth defects.  Encouraged patient to review contents of Prenatal Breastfeeding Education Toolkit. Offered opportunity to answer questions regarding the importance of skin to skin contact, early initiation of exclusive breastfeeding for the first six months and rooming in while in the hospital.  Discussed CDC travel advisory for Zika virus.  Syphilis is a sexually transmitted disease that can cause birth defects in the babies of untreated mothers. Every pregnant patient is tested for syphilis early in each pregnancy as part of the routine lab work. The [place] Department of [facility] has seen an increase in the rate of syphilis in [place]. The [facility] now recommends testing for syphilis 3 times during a pregnancy, the new prenatal visit, 28 weeks and when admitted for delivery    Eduard Gordon

## 2019-01-31 LAB
BACTERIA SPEC CULT: NO GROWTH
C TRACH DNA SPEC QL NAA+PROBE: NEGATIVE
Lab: NORMAL
N GONORRHOEA DNA SPEC QL NAA+PROBE: NEGATIVE
RUBV IGG SERPL IA-ACNC: 17 IU/ML
SPECIMEN SOURCE: NORMAL
T PALLIDUM AB SER QL: NONREACTIVE

## 2019-02-01 LAB
COPATH REPORT: NORMAL
PAP: NORMAL

## 2019-02-27 ENCOUNTER — PRENATAL OFFICE VISIT (OUTPATIENT)
Dept: OBGYN | Facility: CLINIC | Age: 24
End: 2019-02-27
Payer: COMMERCIAL

## 2019-02-27 VITALS
RESPIRATION RATE: 18 BRPM | WEIGHT: 219.6 LBS | DIASTOLIC BLOOD PRESSURE: 67 MMHG | TEMPERATURE: 97.6 F | SYSTOLIC BLOOD PRESSURE: 120 MMHG | HEART RATE: 79 BPM | BODY MASS INDEX: 40.41 KG/M2 | HEIGHT: 62 IN

## 2019-02-27 DIAGNOSIS — Z34.92 PRENATAL CARE IN SECOND TRIMESTER: Primary | ICD-10-CM

## 2019-02-27 DIAGNOSIS — Z34.02 ENCOUNTER FOR SUPERVISION OF NORMAL FIRST PREGNANCY IN SECOND TRIMESTER: ICD-10-CM

## 2019-02-27 DIAGNOSIS — F33.1 MAJOR DEPRESSIVE DISORDER, RECURRENT EPISODE, MODERATE (H): ICD-10-CM

## 2019-02-27 PROCEDURE — 81511 FTL CGEN ABNOR FOUR ANAL: CPT | Mod: 90 | Performed by: OBSTETRICS & GYNECOLOGY

## 2019-02-27 PROCEDURE — 99000 SPECIMEN HANDLING OFFICE-LAB: CPT | Performed by: OBSTETRICS & GYNECOLOGY

## 2019-02-27 PROCEDURE — 99207 ZZC PRENATAL VISIT: CPT | Performed by: OBSTETRICS & GYNECOLOGY

## 2019-02-27 PROCEDURE — 36415 COLL VENOUS BLD VENIPUNCTURE: CPT | Performed by: OBSTETRICS & GYNECOLOGY

## 2019-02-27 RX ORDER — SERTRALINE HYDROCHLORIDE 25 MG/1
50 TABLET, FILM COATED ORAL DAILY
Qty: 60 TABLET | Refills: 1 | Status: SHIPPED | OUTPATIENT
Start: 2019-02-27 | End: 2019-03-01

## 2019-02-27 ASSESSMENT — MIFFLIN-ST. JEOR: SCORE: 1714.35

## 2019-02-27 NOTE — PROGRESS NOTES
Concerns: none  Doing well.  No concerns today.  Discussed anenatal screening.  She accepts testing at this time.  Will schedule anatomy ultrasound.  RTC 4 weeks.      Eduard Gordon MD

## 2019-03-01 DIAGNOSIS — F33.1 MAJOR DEPRESSIVE DISORDER, RECURRENT EPISODE, MODERATE (H): Primary | ICD-10-CM

## 2019-03-01 LAB
# FETUSES US: NORMAL
# FETUSES: NORMAL
AFP ADJ MOM AMN: 0.65
AFP SERPL-MCNC: 14 NG/ML
AGE - REPORTED: 21.9 YR
CURRENT SMOKER: NO
CURRENT SMOKER: NO
DIABETES STATUS PATIENT: NO
FAMILY MEMBER DISEASES HX: NO
FAMILY MEMBER DISEASES HX: NO
GA METHOD: NORMAL
GA METHOD: NORMAL
GA: NORMAL WK
HCG MOM SERPL: 1.46
HCG SERPL-ACNC: NORMAL IU/L
HX OF HEREDITARY DISORDERS: NO
IDDM PATIENT QL: NO
INHIBIN A MOM SERPL: 1.41
INHIBIN A SERPL-MCNC: 229 PG/ML
INTEGRATED SCN PATIENT-IMP: NORMAL
IVF PREGNANCY: NORMAL
LMP START DATE: NORMAL
MONOCHORIONIC TWINS: NO
PATHOLOGY STUDY: NORMAL
PREV FETUS DEFECT: NO
SERVICE CMNT-IMP: NO
SPECIMEN DRAWN SERPL: NORMAL
U ESTRIOL MOM SERPL: 1.18
U ESTRIOL SERPL-MCNC: 0.66 NG/ML
VALPROIC/CARBAMAZEPINE STATUS: NO
WEIGHT UNITS: NORMAL

## 2019-03-01 RX ORDER — SERTRALINE HYDROCHLORIDE 25 MG/1
50 TABLET, FILM COATED ORAL DAILY
Qty: 60 TABLET | Refills: 1 | Status: SHIPPED | OUTPATIENT
Start: 2019-03-01 | End: 2019-05-02 | Stop reason: DRUGHIGH

## 2019-03-01 NOTE — TELEPHONE ENCOUNTER
Is patient to be on 2 tablets of 25 mg Sertraline for a 50 mg daily dose?...    (Rx has both directions of 2 tabs (50 mg) by mouth daily then also states 1 tablet daily...)    Please advise. Emily Narvaez RN

## 2019-03-05 NOTE — TELEPHONE ENCOUNTER
Last office visit 2/27/19    Could patient have new prescription for 50 mg tablet with directions to take 1 tablet PO QD #30 with 1R?    Please advise.  Thank you.    Jonna Amaya   [facility]  RN

## 2019-04-05 ENCOUNTER — PRENATAL OFFICE VISIT (OUTPATIENT)
Dept: OBGYN | Facility: CLINIC | Age: 24
End: 2019-04-05
Payer: COMMERCIAL

## 2019-04-05 ENCOUNTER — HOSPITAL ENCOUNTER (OUTPATIENT)
Dept: ULTRASOUND IMAGING | Facility: CLINIC | Age: 24
Discharge: HOME | End: 2019-04-05
Attending: OBSTETRICS & GYNECOLOGY | Admitting: OBSTETRICS & GYNECOLOGY
Payer: COMMERCIAL

## 2019-04-05 VITALS
BODY MASS INDEX: 41.97 KG/M2 | HEART RATE: 91 BPM | WEIGHT: 228.1 LBS | SYSTOLIC BLOOD PRESSURE: 112 MMHG | HEIGHT: 62 IN | RESPIRATION RATE: 18 BRPM | TEMPERATURE: 97.7 F | DIASTOLIC BLOOD PRESSURE: 68 MMHG

## 2019-04-05 DIAGNOSIS — Z34.92 PRENATAL CARE IN SECOND TRIMESTER: ICD-10-CM

## 2019-04-05 DIAGNOSIS — Z34.02 ENCOUNTER FOR SUPERVISION OF NORMAL FIRST PREGNANCY IN SECOND TRIMESTER: Primary | ICD-10-CM

## 2019-04-05 PROCEDURE — 99207 ZZC PRENATAL VISIT: CPT | Performed by: OBSTETRICS & GYNECOLOGY

## 2019-04-05 PROCEDURE — 76805 OB US >/= 14 WKS SNGL FETUS: CPT

## 2019-04-05 ASSESSMENT — MIFFLIN-ST. JEOR: SCORE: 1752.9

## 2019-04-05 NOTE — NURSING NOTE
"Initial /68 (BP Location: Right arm, Patient Position: Chair, Cuff Size: Adult Large)   Pulse 91   Temp 97.7  F (36.5  C) (Tympanic)   Resp 18   Ht 1.575 m (5' 2\")   Wt 103.5 kg (228 lb 1.6 oz)   LMP 11/20/2018   Breastfeeding? No   BMI 41.72 kg/m   Estimated body mass index is 41.72 kg/m  as calculated from the following:    Height as of this encounter: 1.575 m (5' 2\").    Weight as of this encounter: 103.5 kg (228 lb 1.6 oz). .    Polly Bowie, ALEYDA    "

## 2019-04-05 NOTE — PROGRESS NOTES
Concerns today: ULTRASOUND today BOY!  Doing well.  No concerns today.  No nausea/vomiting. No heartburn.  No vaginal bleeding, no contractions/severe cramping, no leakage of fluid.  No vaginal discharge. No dysuria  No headache, vision changes, lower extremity swelling, upper abdominal pain, chest pain, shortness of breath.   Reportable signs and symptoms discussed.      Eduard Gordon MD

## 2019-05-02 ENCOUNTER — PRENATAL OFFICE VISIT (OUTPATIENT)
Dept: OBGYN | Facility: CLINIC | Age: 24
End: 2019-05-02
Payer: COMMERCIAL

## 2019-05-02 VITALS
WEIGHT: 229 LBS | SYSTOLIC BLOOD PRESSURE: 121 MMHG | HEIGHT: 62 IN | RESPIRATION RATE: 18 BRPM | BODY MASS INDEX: 42.14 KG/M2 | TEMPERATURE: 97.4 F | DIASTOLIC BLOOD PRESSURE: 58 MMHG | HEART RATE: 94 BPM

## 2019-05-02 DIAGNOSIS — Z34.02 ENCOUNTER FOR SUPERVISION OF NORMAL FIRST PREGNANCY IN SECOND TRIMESTER: ICD-10-CM

## 2019-05-02 DIAGNOSIS — Z34.02 ENCOUNTER FOR SUPERVISION OF NORMAL FIRST PREGNANCY IN SECOND TRIMESTER: Primary | ICD-10-CM

## 2019-05-02 LAB
ERYTHROCYTE [DISTWIDTH] IN BLOOD BY AUTOMATED COUNT: 13.3 % (ref 10–15)
GLUCOSE 1H P 50 G GLC PO SERPL-MCNC: 100 MG/DL (ref 60–129)
HCT VFR BLD AUTO: 35.9 % (ref 35–47)
HGB BLD-MCNC: 11.9 G/DL (ref 11.7–15.7)
MCH RBC QN AUTO: 30.5 PG (ref 26.5–33)
MCHC RBC AUTO-ENTMCNC: 33.1 G/DL (ref 31.5–36.5)
MCV RBC AUTO: 92 FL (ref 78–100)
PLATELET # BLD AUTO: 354 10E9/L (ref 150–450)
RBC # BLD AUTO: 3.9 10E12/L (ref 3.8–5.2)
WBC # BLD AUTO: 13.2 10E9/L (ref 4–11)

## 2019-05-02 PROCEDURE — 82950 GLUCOSE TEST: CPT | Performed by: OBSTETRICS & GYNECOLOGY

## 2019-05-02 PROCEDURE — 99207 ZZC PRENATAL VISIT: CPT | Performed by: OBSTETRICS & GYNECOLOGY

## 2019-05-02 PROCEDURE — 86780 TREPONEMA PALLIDUM: CPT | Performed by: OBSTETRICS & GYNECOLOGY

## 2019-05-02 PROCEDURE — 36415 COLL VENOUS BLD VENIPUNCTURE: CPT | Performed by: OBSTETRICS & GYNECOLOGY

## 2019-05-02 PROCEDURE — 85027 COMPLETE CBC AUTOMATED: CPT | Performed by: OBSTETRICS & GYNECOLOGY

## 2019-05-02 ASSESSMENT — MIFFLIN-ST. JEOR: SCORE: 1756.99

## 2019-05-02 NOTE — PROGRESS NOTES
Prenatal Breastfeeding Education Toolkit provided for patient to review, helping her to make an informed decision on a feeding choice for her baby. Questions directed to the provider.Swetha Roe

## 2019-05-02 NOTE — PROGRESS NOTES
Concerns: feeling well    Doing well.  No concerns today.  No vaginal bleeding, no contractions, no leakage of fluid  No nausea/vomiting. No heartburn  No vaginal discharge. No dysuria.   No headache, vision changes, lower extremity swelling, upper abdominal pain, chest pain, shortness of breath  Tdap planned next visit  Discussed PTL, PROM, and when to call or come in.  Normal anatomy ultrasound.  RTC 4 weeks.  GTT and labs today       Eduard Gordon MD

## 2019-05-03 ENCOUNTER — TELEPHONE (OUTPATIENT)
Dept: OBGYN | Facility: CLINIC | Age: 24
End: 2019-05-03

## 2019-05-03 LAB — T PALLIDUM AB SER QL: NONREACTIVE

## 2019-05-03 NOTE — TELEPHONE ENCOUNTER
Pt 24 weeks pregnant started having sharp left side pains that feel like a period cramp, should she be seen or go home from work?      Please call-     Cydney Piedra  Cl[facility]

## 2019-05-03 NOTE — TELEPHONE ENCOUNTER
Return call to patient.  Spoke with patient on the phone.    S-(situation): LLQ sharp pain that started 1/2 hour ago. Patient reports pain is not constant. Patient reports pain comes and goes. Patient reports pain is worse with leaning over and getting up out of a chair. Patient denies fever or vomiting. Patient reports some usual nausea. Patient denies UTI symptoms. Patient reports +bowel movement about one hour ago. Patient reports bowel movement's can be difficult at time and hard to pass. Patient denies bleeding, leaking of fluid, cramping or contractions. Patient reports normal fetal movements.     B-(background):  at 24W2D    A-(assessment): gas pain vs round ligament pain    R-(recommendations): May try Gas X for LLQ pain. Reviewed comfort measures for round ligament pain. Reviewed round ligament pain and discomfort that it can cause. Reviewed baby is unaffected by round ligament pain. Heat to abdomen for comfort and muscle relaxation. Rest off of feet. Move slowly and carefully when pain is triggered by movement. Reviewed pregnancy stretching. Call with bleeding, contractions, leaking of fluid or decreased fetal movement.    Patient may continue to work if she feels she is able.    Pt in agreement and reports understanding.    Jonna Amaya   [facility]  RN

## 2019-05-30 ENCOUNTER — PRENATAL OFFICE VISIT (OUTPATIENT)
Dept: OBGYN | Facility: CLINIC | Age: 24
End: 2019-05-30
Payer: COMMERCIAL

## 2019-05-30 VITALS
HEIGHT: 64 IN | RESPIRATION RATE: 16 BRPM | HEART RATE: 94 BPM | WEIGHT: 229.8 LBS | TEMPERATURE: 97.3 F | BODY MASS INDEX: 39.23 KG/M2 | DIASTOLIC BLOOD PRESSURE: 77 MMHG | SYSTOLIC BLOOD PRESSURE: 135 MMHG

## 2019-05-30 DIAGNOSIS — Z34.92 PRENATAL CARE IN SECOND TRIMESTER: Primary | ICD-10-CM

## 2019-05-30 DIAGNOSIS — F33.1 MAJOR DEPRESSIVE DISORDER, RECURRENT EPISODE, MODERATE (H): ICD-10-CM

## 2019-05-30 DIAGNOSIS — Z34.03 ENCOUNTER FOR SUPERVISION OF NORMAL FIRST PREGNANCY IN THIRD TRIMESTER: ICD-10-CM

## 2019-05-30 PROCEDURE — 90471 IMMUNIZATION ADMIN: CPT | Performed by: OBSTETRICS & GYNECOLOGY

## 2019-05-30 PROCEDURE — 99207 ZZC PRENATAL VISIT: CPT | Performed by: OBSTETRICS & GYNECOLOGY

## 2019-05-30 PROCEDURE — 90715 TDAP VACCINE 7 YRS/> IM: CPT | Performed by: OBSTETRICS & GYNECOLOGY

## 2019-05-30 ASSESSMENT — MIFFLIN-ST. JEOR: SCORE: 1792.37

## 2019-05-30 NOTE — PROGRESS NOTES
Screening Questionnaire for Adult Immunization    Are you sick today?   No   Do you have allergies to medications, food, a vaccine component or latex?   No   Have you ever had a serious reaction after receiving a vaccination?   No   Do you have a long-term health problem with heart disease, lung disease, asthma, kidney disease, metabolic disease (e.g. diabetes), anemia, or other blood disorder?   No   Do you have cancer, leukemia, HIV/AIDS, or any other immune system problem?   No   In the past 3 months, have you taken medications that affect  your immune system, such as prednisone, other steroids, or anticancer drugs; drugs for the treatment of rheumatoid arthritis, Crohn s disease, or psoriasis; or have you had radiation treatments?   No   Have you had a seizure, or a brain or other nervous system problem?   No   During the past year, have you received a transfusion of blood or blood     products, or been given immune (gamma) globulin or antiviral drug?   No   For women: Are you pregnant or is there a chance you could become        pregnant during the next month?   Yes   Have you received any vaccinations in the past 4 weeks?   No     Per orders of Dr. Gordon, injection of TDAP given by Sherrie Schuster. Patient instructed to remain in clinic for 15 minutes afterwards, and to report any adverse reaction to me immediately.       Screening performed by Sherrie Schuster on 5/30/2019 at 3:00 PM.

## 2019-05-30 NOTE — PROGRESS NOTES
Concerns: Having some back pain.  She has not tried a belly band the past.  No vaginal bleeding, loss of fluid, or contractions  Tdap given today  Discussed kick counts and fetal movement.  Discussed PTL, PROM, and when to call or come in.  Pregnancy belt provided.  RTC in 2 weeks.      Eduard Gordon MD

## 2019-06-12 ENCOUNTER — PRENATAL OFFICE VISIT (OUTPATIENT)
Dept: OBGYN | Facility: CLINIC | Age: 24
End: 2019-06-12
Payer: COMMERCIAL

## 2019-06-12 VITALS
BODY MASS INDEX: 40.02 KG/M2 | WEIGHT: 234.4 LBS | HEIGHT: 64 IN | TEMPERATURE: 98.2 F | HEART RATE: 100 BPM | RESPIRATION RATE: 18 BRPM | SYSTOLIC BLOOD PRESSURE: 123 MMHG | DIASTOLIC BLOOD PRESSURE: 73 MMHG

## 2019-06-12 DIAGNOSIS — Z34.03 ENCOUNTER FOR SUPERVISION OF NORMAL FIRST PREGNANCY IN THIRD TRIMESTER: Primary | ICD-10-CM

## 2019-06-12 PROCEDURE — 99207 ZZC PRENATAL VISIT: CPT | Performed by: OBSTETRICS & GYNECOLOGY

## 2019-06-12 ASSESSMENT — MIFFLIN-ST. JEOR: SCORE: 1813.23

## 2019-06-12 NOTE — PROGRESS NOTES
"  Concerns: back is better with the pregnancy belt  Doing well.  No concerns today.  No vaginal bleeding, LOF.  No contractions.  Cephalic position confirmed by Leopold maneuvers and \"quick-look\" ultrasound.  Discussed kick counts and fetal movement.  Reportable signs and symptoms discussed.  Discussed kick counts and fetal movement.  Discussed PTL, PROM, and when to call or come in.  RTC 2 weeks.    Eduard Gordon MD      "

## 2019-06-12 NOTE — PROGRESS NOTES
Gave pt. Breastfeeding (Why its important and what to expect) pamphlet at today's visit.  Chastity Saldana MA on 6/12/2019 at 3:02 PM

## 2019-06-12 NOTE — NURSING NOTE
"Chief Complaint   Patient presents with     Prenatal Care       Initial /73 (BP Location: Right arm, Patient Position: Chair, Cuff Size: Adult Large)   Pulse 100   Temp 98.2  F (36.8  C) (Tympanic)   Resp 18   Ht 1.626 m (5' 4\")   Wt 106.3 kg (234 lb 6.4 oz)   LMP 11/20/2018   BMI 40.23 kg/m   Estimated body mass index is 40.23 kg/m  as calculated from the following:    Height as of this encounter: 1.626 m (5' 4\").    Weight as of this encounter: 106.3 kg (234 lb 6.4 oz).  Medications and allergies reviewed.    Chastity SUAREZ CMA (AAMA)    "

## 2019-06-16 ENCOUNTER — HOSPITAL ENCOUNTER (EMERGENCY)
Facility: CLINIC | Age: 24
Discharge: HOME | End: 2019-06-16
Attending: EMERGENCY MEDICINE | Admitting: EMERGENCY MEDICINE
Payer: COMMERCIAL

## 2019-06-16 VITALS
WEIGHT: 230 LBS | OXYGEN SATURATION: 99 % | HEART RATE: 99 BPM | BODY MASS INDEX: 39.48 KG/M2 | DIASTOLIC BLOOD PRESSURE: 90 MMHG | TEMPERATURE: 97.5 F | RESPIRATION RATE: 16 BRPM | SYSTOLIC BLOOD PRESSURE: 138 MMHG

## 2019-06-16 DIAGNOSIS — H60.331 ACUTE SWIMMER'S EAR OF RIGHT SIDE: ICD-10-CM

## 2019-06-16 PROCEDURE — 99284 EMERGENCY DEPT VISIT MOD MDM: CPT | Mod: Z6 | Performed by: EMERGENCY MEDICINE

## 2019-06-16 PROCEDURE — 99282 EMERGENCY DEPT VISIT SF MDM: CPT | Performed by: EMERGENCY MEDICINE

## 2019-06-16 RX ORDER — NEOMYCIN SULFATE, POLYMYXIN B SULFATE, HYDROCORTISONE 3.5; 10000; 1 MG/ML; [USP'U]/ML; MG/ML
4 SOLUTION/ DROPS AURICULAR (OTIC) 4 TIMES DAILY
Qty: 6 ML | Refills: 0 | Status: SHIPPED | OUTPATIENT
Start: 2019-06-16 | End: 2019-08-15

## 2019-06-16 NOTE — ED AVS SNAPSHOT
[facility] Emergency Department  [address] [address]  Phone:  [phone]  Fax:  [phone]                                    Saundra Urrutia   MRN: [id number]    Department:  [facility] Emergency Department   Date of Visit:  6/16/2019           After Visit Summary Signature Page    I have received my discharge instructions, and my questions have been answered. I have discussed any challenges I see with this plan with the nurse or doctor.    ..........................................................................................................................................  Patient/Patient Representative Signature      ..........................................................................................................................................  Patient Representative Print Name and Relationship to Patient    ..................................................               ................................................  Date                                   Time    ..........................................................................................................................................  Reviewed by Signature/Title    ...................................................              ..............................................  Date                                               Time          22EPIC Rev 08/18

## 2019-06-16 NOTE — ED PROVIDER NOTES
History     Chief Complaint   Patient presents with     Otalgia     right ear pain     HPI  Saundra Urrutia is a 23 year old  female at 30w who presents for right ear pain.  Symptoms been getting worse since yesterday.  She [place] and takes frequent baths.  She denies fever, chills, sore throat, runny nose, cough.  No difficulty breathing.  No nausea or vomiting.  She has not take anything for the pain.    Allergies:  Allergies   Allergen Reactions     Sulfa Drugs      Red eye       Problem List:    Patient Active Problem List    Diagnosis Date Noted     Major depressive disorder, recurrent episode, moderate (H) 2018     Priority: Medium     Bilateral carpal tunnel syndrome 2018     Priority: Medium     Obesity 2014     Priority: Medium     Self-injurious behavior 2013     Priority: Medium     Superficial cutting       Hyperhidrosis 2013     Priority: Medium     HL (hearing loss) 2008     Priority: Medium     Saw audiology , sent to ENT           Past Medical History:    Past Medical History:   Diagnosis Date     Anxiety      Depression        Past Surgical History:    Past Surgical History:   Procedure Laterality Date     NO HISTORY OF SURGERY         Family History:    Family History   Problem Relation Age of Onset     Diabetes Paternal Grandfather      Cancer Other      Depression Maternal Grandmother      Depression Mother      Substance Abuse Mother         recovered  A&D     Diabetes Father      Depression Father      Heart Disease Father      Substance Abuse Father         A&D     Breast Cancer Paternal Grandmother      C.A.D. No family hx of        Social History:  Marital Status:  Single [1]  Social History     Tobacco Use     Smoking status: Former Smoker     Packs/day: 0.01     Types: Cigarettes     Smokeless tobacco: Never Used     Tobacco comment: uses E-cig   Substance Use Topics     Alcohol use: Yes     Comment: rare-quit with pregnancy      Drug use: Yes     Types: Marijuana     Comment: last use 12/10/18        Medications:      neomycin-polymyxin-hydrocortisone (CORTISPORIN) 3.5-84349-5 otic solution   order for DME   Prenatal Vit-Fe Fumarate-FA (PRENATAL MULTIVITAMIN W/IRON) 27-0.8 MG tablet   sertraline (ZOLOFT) 50 MG tablet         Review of Systems  A 4 point review of systems was performed. All pertinent positives and negatives were listed in the HPI and rest of ROS were otherwise negative.    Physical Exam   BP: 138/90  Pulse: 99  Temp: 97.5  F (36.4  C)  Resp: 16  Weight: 104.3 kg (230 lb)  SpO2: 99 %      Physical Exam   Constitutional: She is oriented to person, place, and time. She appears well-developed and well-nourished. No distress.   HENT:   Head: Normocephalic and atraumatic.   Right Ear: Tympanic membrane normal. There is swelling (within the outer canal) and tenderness (of the tragus). No drainage. No mastoid tenderness.   Left Ear: Tympanic membrane and ear canal normal.   Mouth/Throat: No tonsillar abscesses. No tonsillar exudate.   Eyes: No scleral icterus.   Neck: Normal range of motion. Neck supple.   Neurological: She is alert and oriented to person, place, and time.   Skin: Skin is warm and dry. No rash noted. She is not diaphoretic. No erythema. No pallor.       ED Course        Procedures               Critical Care time:  none               No results found for this or any previous visit (from the past 24 hour(s)).    Medications - No data to display    Assessments & Plan (with Medical Decision Making)   23-year-old female presents with right ear pain.  No signs of otitis media or mastoiditis on exam.  Clinically she has an otitis externa, likely related to swimming.  She is safe to discharge with instructions to use eardrops as prescribed, return if worse, otherwise follow-up in clinic.  The patient is in agreement with this plan.    I have reviewed the nursing notes.    I have reviewed the findings, diagnosis, plan and  need for follow up with the patient.          Medication List      Started    neomycin-polymyxin-hydrocortisone 3.5-75148-6 otic solution  Commonly known as:  CORTISPORIN  4 drops, Otic, 4 TIMES DAILY            Final diagnoses:   Acute swimmer's ear of right side       6/16/2019   [facility] EMERGENCY DEPARTMENT     Jerry Ricci MD  06/16/19 4040

## 2019-06-16 NOTE — DISCHARGE INSTRUCTIONS
Use the antibiotic drops as prescribed.  Return to the emergency department for spreading rash, increased pain, worsening symptoms, or other concerns.  Use acetaminophen as needed for pain.  Otherwise follow-up in clinic if not better in the next 4-5 days.

## 2019-06-26 ENCOUNTER — PRENATAL OFFICE VISIT (OUTPATIENT)
Dept: OBGYN | Facility: CLINIC | Age: 24
End: 2019-06-26
Payer: COMMERCIAL

## 2019-06-26 VITALS
DIASTOLIC BLOOD PRESSURE: 73 MMHG | HEART RATE: 115 BPM | BODY MASS INDEX: 39.44 KG/M2 | SYSTOLIC BLOOD PRESSURE: 122 MMHG | HEIGHT: 64 IN | WEIGHT: 231 LBS | RESPIRATION RATE: 18 BRPM | TEMPERATURE: 98 F

## 2019-06-26 DIAGNOSIS — Z34.03 PREGNANCY, SUPERVISION OF FIRST, THIRD TRIMESTER: ICD-10-CM

## 2019-06-26 PROCEDURE — 99207 ZZC PRENATAL VISIT: CPT | Performed by: OBSTETRICS & GYNECOLOGY

## 2019-06-26 ASSESSMENT — MIFFLIN-ST. JEOR: SCORE: 1797.81

## 2019-06-26 NOTE — PROGRESS NOTES
"CC: Here for routine prenatal visit @ 32w0d   HPI:  Feeling well; Encouraged patient to review contents of Prenatal Breastfeeding Education Toolkit. Offered opportunity to answer questions regarding the importance of skin to skin contact, early initiation of exclusive breastfeeding for the first six months and rooming in while in the hospital.  Would like tour of BC today if possible    PE: /73 (BP Location: Right arm, Patient Position: Chair, Cuff Size: Adult Large)   Pulse 115   Temp 98  F (36.7  C) (Tympanic)   Resp 18   Ht 1.626 m (5' 4\")   Wt 104.8 kg (231 lb)   LMP 11/20/2018   BMI 39.65 kg/m     See OB flowsheet      A:  1. Pregnancy, supervision of first, third trimester        Routine prenatal care  RTC 2 weeks.      Ryanne Tian M.D.     "

## 2019-07-11 ENCOUNTER — PRENATAL OFFICE VISIT (OUTPATIENT)
Dept: OBGYN | Facility: CLINIC | Age: 24
End: 2019-07-11
Payer: COMMERCIAL

## 2019-07-11 VITALS
DIASTOLIC BLOOD PRESSURE: 72 MMHG | HEIGHT: 64 IN | BODY MASS INDEX: 39.79 KG/M2 | SYSTOLIC BLOOD PRESSURE: 132 MMHG | RESPIRATION RATE: 16 BRPM | HEART RATE: 98 BPM | WEIGHT: 233.1 LBS | TEMPERATURE: 97.3 F

## 2019-07-11 DIAGNOSIS — Z34.03 ENCOUNTER FOR SUPERVISION OF NORMAL FIRST PREGNANCY IN THIRD TRIMESTER: Primary | ICD-10-CM

## 2019-07-11 PROCEDURE — 99207 ZZC PRENATAL VISIT: CPT | Performed by: OBSTETRICS & GYNECOLOGY

## 2019-07-11 ASSESSMENT — MIFFLIN-ST. JEOR: SCORE: 1807.33

## 2019-07-11 NOTE — LETTER
[facility]  [address] [address]  Phone: [phone]      July 11, 2019      Saundra Urrutia  [address]  [place] [address]              To whom it may concern:    Saundra Urrutia is being seen in our clinic for prenatal care.  Her Estimated Date of Delivery: Aug 21, 2019.  She is not able to work more than 6 hours per shift for the remainder of her pregnancy.      Sincerely,    Eduard Gordon MD

## 2019-07-11 NOTE — PROGRESS NOTES
Concerns: carpal tunnel is better with braces  She is having pain at the end of her shift (7-9 hours)  No vaginal bleeding, LOF.  No contractions.  Reportable signs and symptoms discussed.  Discussed kick counts and fetal movement.  Discussed PTL, PROM, and when to call or come in.  RTC 2 weeks.  Work decrease note given    Eduard Gordon MD

## 2019-07-25 ENCOUNTER — PRENATAL OFFICE VISIT (OUTPATIENT)
Dept: OBGYN | Facility: CLINIC | Age: 24
End: 2019-07-25
Payer: COMMERCIAL

## 2019-07-25 VITALS
HEART RATE: 86 BPM | TEMPERATURE: 97.5 F | BODY MASS INDEX: 39.64 KG/M2 | WEIGHT: 232.2 LBS | DIASTOLIC BLOOD PRESSURE: 70 MMHG | HEIGHT: 64 IN | RESPIRATION RATE: 18 BRPM | SYSTOLIC BLOOD PRESSURE: 102 MMHG

## 2019-07-25 DIAGNOSIS — Z34.03 PREGNANCY, SUPERVISION OF FIRST, THIRD TRIMESTER: Primary | ICD-10-CM

## 2019-07-25 PROCEDURE — 87653 STREP B DNA AMP PROBE: CPT | Performed by: OBSTETRICS & GYNECOLOGY

## 2019-07-25 PROCEDURE — 99207 ZZC PRENATAL VISIT: CPT | Performed by: OBSTETRICS & GYNECOLOGY

## 2019-07-25 ASSESSMENT — MIFFLIN-ST. JEOR: SCORE: 1803.25

## 2019-07-26 LAB
GP B STREP DNA SPEC QL NAA+PROBE: NEGATIVE
SPECIMEN SOURCE: NORMAL

## 2019-07-26 NOTE — PROGRESS NOTES
Concerns: none  .  Doing well.  No concerns today.  No vaginal bleeding, LOF, contractions.  No HA, RUQ pain, N/V, visual changes.  Cervix is posterior, long, closed and soft.  Discussed kick counts and fetal movement.  Reportable signs and symptoms discussed.  GBS done today.  Labor precautions discussed.  RTC 1 week.  Prenatal flowsheet information is reviewed.    Eduard Gordon MD

## 2019-08-01 ENCOUNTER — PRENATAL OFFICE VISIT (OUTPATIENT)
Dept: OBGYN | Facility: CLINIC | Age: 24
End: 2019-08-01
Payer: COMMERCIAL

## 2019-08-01 VITALS
BODY MASS INDEX: 39.97 KG/M2 | RESPIRATION RATE: 16 BRPM | HEIGHT: 64 IN | HEART RATE: 98 BPM | DIASTOLIC BLOOD PRESSURE: 68 MMHG | TEMPERATURE: 97.6 F | SYSTOLIC BLOOD PRESSURE: 117 MMHG | WEIGHT: 234.1 LBS

## 2019-08-01 DIAGNOSIS — Z34.03 PREGNANCY, SUPERVISION OF FIRST, THIRD TRIMESTER: Primary | ICD-10-CM

## 2019-08-01 PROCEDURE — 99207 ZZC PRENATAL VISIT: CPT | Performed by: OBSTETRICS & GYNECOLOGY

## 2019-08-01 ASSESSMENT — MIFFLIN-ST. JEOR: SCORE: 1811.87

## 2019-08-01 NOTE — PROGRESS NOTES
Doing well.  +FM, occasional ctx, no VB or LOF.    23 year old  at 37w1d  - reviewed s/s labor   - discussed IOL after 41 weeks for post-dates or by maternal request with a favorable cervix after 39 weeks.  Discussed with Saundra Urrutia, the following; indications; the agents and methods of labor augmentation, including risks, benefits, and alternative approaches; and the possible need for  birth. EFW is AGA.  The Labor Induction:what you need to know information sheet was made available to her. Questions and concerns were addressed and patient agrees to above if necessary during the course of her labor.      RTC weekly until delivery    Rosie Solorio MD, MPH  [facility]

## 2019-08-01 NOTE — NURSING NOTE
"Initial /68 (BP Location: Right arm, Patient Position: Sitting, Cuff Size: Adult Large)   Pulse 98   Temp 97.6  F (36.4  C) (Tympanic)   Resp 16   Ht 1.626 m (5' 4\")   Wt 106.2 kg (234 lb 1.6 oz)   LMP 11/20/2018   Breastfeeding? No   BMI 40.18 kg/m   Estimated body mass index is 40.18 kg/m  as calculated from the following:    Height as of this encounter: 1.626 m (5' 4\").    Weight as of this encounter: 106.2 kg (234 lb 1.6 oz). .    Laura Palacios, [facility]    "

## 2019-08-05 ENCOUNTER — TELEPHONE (OUTPATIENT)
Dept: FAMILY MEDICINE | Facility: CLINIC | Age: 24
End: 2019-08-05

## 2019-08-05 NOTE — TELEPHONE ENCOUNTER
Panel Management Review      Patient has the following on her problem list:     Depression / Dysthymia review    Measure:  Needs PHQ-9 score of 4 or less during index window.  Administer PHQ-9 and if score is 5 or more, send encounter to provider for next steps.    5 - 7 month window range: missed    PHQ-9 SCORE 9/8/2017 8/23/2018 11/2/2018   PHQ-9 Total Score - - -   PHQ-9 Total Score 0 4 12       If PHQ-9 recheck is 5 or more, route to provider for next steps.    Patient is due for:  PHQ9      Composite cancer screening  Chart review shows that this patient is due/due soon for the following None  Summary:    Patient is due/failing the following:   PHQ9    Action needed:   Patient needs to do PHQ9.    Type of outreach:    Sent [employer] message.    Questions for provider review:    None                                                                                                                                    Saima Ferreira CMA       Chart routed to self .

## 2019-08-08 ENCOUNTER — PRENATAL OFFICE VISIT (OUTPATIENT)
Dept: OBGYN | Facility: CLINIC | Age: 24
End: 2019-08-08
Payer: COMMERCIAL

## 2019-08-08 VITALS
WEIGHT: 235.8 LBS | DIASTOLIC BLOOD PRESSURE: 84 MMHG | TEMPERATURE: 97.9 F | HEIGHT: 64 IN | RESPIRATION RATE: 18 BRPM | HEART RATE: 123 BPM | BODY MASS INDEX: 40.26 KG/M2 | SYSTOLIC BLOOD PRESSURE: 131 MMHG

## 2019-08-08 DIAGNOSIS — Z34.03 ENCOUNTER FOR SUPERVISION OF NORMAL FIRST PREGNANCY IN THIRD TRIMESTER: Primary | ICD-10-CM

## 2019-08-08 PROCEDURE — 99207 ZZC PRENATAL VISIT: CPT | Performed by: OBSTETRICS & GYNECOLOGY

## 2019-08-08 ASSESSMENT — MIFFLIN-ST. JEOR: SCORE: 1819.58

## 2019-08-08 NOTE — PROGRESS NOTES
Concerns:   Doing well.  No concerns today.  No vaginal bleeding, LOF, contractions.  No HA, RUQ pain, N/V, visual changes.  Cervix is posterior, finger-tip dilated and soft.  Cephalic position confirmed by Leopold maneuvers and cervical exam.  Discussed signs of labor and when to call or come in.  Labor precautions discussed.  RTC 1 week.    Eduard Gordon MD

## 2019-08-15 ENCOUNTER — PRENATAL OFFICE VISIT (OUTPATIENT)
Dept: OBGYN | Facility: CLINIC | Age: 24
End: 2019-08-15
Payer: COMMERCIAL

## 2019-08-15 VITALS
HEIGHT: 64 IN | RESPIRATION RATE: 16 BRPM | DIASTOLIC BLOOD PRESSURE: 71 MMHG | SYSTOLIC BLOOD PRESSURE: 134 MMHG | HEART RATE: 98 BPM | WEIGHT: 237.2 LBS | TEMPERATURE: 97 F | BODY MASS INDEX: 40.49 KG/M2

## 2019-08-15 DIAGNOSIS — Z34.03 ENCOUNTER FOR SUPERVISION OF NORMAL FIRST PREGNANCY IN THIRD TRIMESTER: Primary | ICD-10-CM

## 2019-08-15 LAB
ALT SERPL W P-5'-P-CCNC: 27 U/L (ref 0–50)
AST SERPL W P-5'-P-CCNC: 12 U/L (ref 0–45)
CREAT SERPL-MCNC: 0.59 MG/DL (ref 0.52–1.04)
CREAT UR-MCNC: 157 MG/DL
ERYTHROCYTE [DISTWIDTH] IN BLOOD BY AUTOMATED COUNT: 14.9 % (ref 10–15)
GFR SERPL CREATININE-BSD FRML MDRD: >90 ML/MIN/{1.73_M2}
HCT VFR BLD AUTO: 39.1 % (ref 35–47)
HGB BLD-MCNC: 12.7 G/DL (ref 11.7–15.7)
MCH RBC QN AUTO: 29.4 PG (ref 26.5–33)
MCHC RBC AUTO-ENTMCNC: 32.5 G/DL (ref 31.5–36.5)
MCV RBC AUTO: 91 FL (ref 78–100)
PLATELET # BLD AUTO: 308 10E9/L (ref 150–450)
PROT UR-MCNC: 0.25 G/L
PROT/CREAT 24H UR: 0.16 G/G CR (ref 0–0.2)
RBC # BLD AUTO: 4.32 10E12/L (ref 3.8–5.2)
WBC # BLD AUTO: 11.3 10E9/L (ref 4–11)

## 2019-08-15 PROCEDURE — 99207 ZZC PRENATAL VISIT: CPT | Performed by: OBSTETRICS & GYNECOLOGY

## 2019-08-15 PROCEDURE — 85027 COMPLETE CBC AUTOMATED: CPT | Performed by: OBSTETRICS & GYNECOLOGY

## 2019-08-15 PROCEDURE — 82565 ASSAY OF CREATININE: CPT | Performed by: OBSTETRICS & GYNECOLOGY

## 2019-08-15 PROCEDURE — 36415 COLL VENOUS BLD VENIPUNCTURE: CPT | Performed by: OBSTETRICS & GYNECOLOGY

## 2019-08-15 PROCEDURE — 84450 TRANSFERASE (AST) (SGOT): CPT | Performed by: OBSTETRICS & GYNECOLOGY

## 2019-08-15 PROCEDURE — 84460 ALANINE AMINO (ALT) (SGPT): CPT | Performed by: OBSTETRICS & GYNECOLOGY

## 2019-08-15 PROCEDURE — 84156 ASSAY OF PROTEIN URINE: CPT | Performed by: OBSTETRICS & GYNECOLOGY

## 2019-08-15 RX ORDER — METHYLERGONOVINE MALEATE 0.2 MG/ML
200 INJECTION INTRAVENOUS
Status: CANCELLED | OUTPATIENT
Start: 2019-08-15

## 2019-08-15 RX ORDER — OXYTOCIN/0.9 % SODIUM CHLORIDE 30/500 ML
1-24 PLASTIC BAG, INJECTION (ML) INTRAVENOUS CONTINUOUS
Status: CANCELLED | OUTPATIENT
Start: 2019-08-15

## 2019-08-15 RX ORDER — OXYTOCIN 10 [USP'U]/ML
10 INJECTION, SOLUTION INTRAMUSCULAR; INTRAVENOUS
Status: CANCELLED | OUTPATIENT
Start: 2019-08-15

## 2019-08-15 RX ORDER — OXYTOCIN/0.9 % SODIUM CHLORIDE 30/500 ML
100-340 PLASTIC BAG, INJECTION (ML) INTRAVENOUS CONTINUOUS PRN
Status: CANCELLED | OUTPATIENT
Start: 2019-08-15

## 2019-08-15 RX ORDER — CARBOPROST TROMETHAMINE 250 UG/ML
250 INJECTION, SOLUTION INTRAMUSCULAR
Status: CANCELLED | OUTPATIENT
Start: 2019-08-15

## 2019-08-15 RX ORDER — ACETAMINOPHEN 325 MG/1
650 TABLET ORAL EVERY 4 HOURS PRN
Status: CANCELLED | OUTPATIENT
Start: 2019-08-15

## 2019-08-15 RX ORDER — IBUPROFEN 400 MG/1
800 TABLET, FILM COATED ORAL
Status: CANCELLED | OUTPATIENT
Start: 2019-08-15

## 2019-08-15 RX ORDER — LIDOCAINE 40 MG/G
CREAM TOPICAL
Status: CANCELLED | OUTPATIENT
Start: 2019-08-15

## 2019-08-15 RX ORDER — OXYCODONE AND ACETAMINOPHEN 5; 325 MG/1; MG/1
1 TABLET ORAL
Status: CANCELLED | OUTPATIENT
Start: 2019-08-15

## 2019-08-15 RX ORDER — ONDANSETRON 2 MG/ML
4 INJECTION INTRAMUSCULAR; INTRAVENOUS EVERY 6 HOURS PRN
Status: CANCELLED | OUTPATIENT
Start: 2019-08-15

## 2019-08-15 RX ORDER — SODIUM CHLORIDE, SODIUM LACTATE, POTASSIUM CHLORIDE, CALCIUM CHLORIDE 600; 310; 30; 20 MG/100ML; MG/100ML; MG/100ML; MG/100ML
INJECTION, SOLUTION INTRAVENOUS CONTINUOUS
Status: CANCELLED | OUTPATIENT
Start: 2019-08-15

## 2019-08-15 RX ORDER — NALOXONE HYDROCHLORIDE 0.4 MG/ML
.1-.4 INJECTION, SOLUTION INTRAMUSCULAR; INTRAVENOUS; SUBCUTANEOUS
Status: CANCELLED | OUTPATIENT
Start: 2019-08-15

## 2019-08-15 ASSESSMENT — MIFFLIN-ST. JEOR: SCORE: 1825.93

## 2019-08-15 NOTE — PROGRESS NOTES
Concerns: feels Done  Doing well.  No concerns today.  No vaginal bleeding, LOF, contractions.  No HA, RUQ pain, N/V, visual changes.  Cephalic position confirmed by Leopold maneuvers and cervical exam.  Discussed indications, risks, complications and failure rate of inductions.  Reportable signs and symptoms discussed.  Labor precautions discussed.  RTC Monday night 5Pm 2019 for Cervidil  Discussed with Saundra Urrutia, the following; indications; the agents and methods of labor augmentation, including risks, benefits, and alternative approaches; and the possible need for  birth. EFW is AGA.    The Labor Induction:what you need to know information sheet was made available to her. Questions and concerns were addressed and patient agrees to above if necessary during the course of her labor.  (Z34.03) Encounter for supervision of normal first pregnancy in third trimester  (primary encounter diagnosis)  Comment: elevated BP  Plan: ALT, AST, CBC with platelets, Protein  random         urine with Creat Ratio, Creatinine, Creatinine         urine calculation only              Eduard Gordon MD

## 2019-08-19 ENCOUNTER — HOSPITAL ENCOUNTER (INPATIENT)
Facility: CLINIC | Age: 24
LOS: 4 days | Discharge: HOME | End: 2019-08-23
Attending: OBSTETRICS & GYNECOLOGY | Admitting: OBSTETRICS & GYNECOLOGY
Payer: COMMERCIAL

## 2019-08-19 ENCOUNTER — TELEPHONE (OUTPATIENT)
Dept: OBGYN | Facility: CLINIC | Age: 24
End: 2019-08-19

## 2019-08-19 DIAGNOSIS — Z98.891 S/P CESAREAN SECTION: Primary | ICD-10-CM

## 2019-08-19 PROBLEM — Z34.03 ENCOUNTER FOR SUPERVISION OF NORMAL FIRST PREGNANCY IN THIRD TRIMESTER: Status: ACTIVE | Noted: 2019-08-19

## 2019-08-19 LAB
ABO + RH BLD: NORMAL
ABO + RH BLD: NORMAL
AMPHETAMINES UR QL SCN: NEGATIVE
BARBITURATES UR QL: NEGATIVE
BASOPHILS # BLD AUTO: 0 10E9/L (ref 0–0.2)
BASOPHILS NFR BLD AUTO: 0.3 %
BENZODIAZ UR QL: NEGATIVE
BLD GP AB SCN SERPL QL: NORMAL
BLOOD BANK CMNT PATIENT-IMP: NORMAL
CANNABINOIDS UR QL SCN: POSITIVE
COCAINE UR QL: NEGATIVE
DIFFERENTIAL METHOD BLD: ABNORMAL
EOSINOPHIL # BLD AUTO: 0.1 10E9/L (ref 0–0.7)
EOSINOPHIL NFR BLD AUTO: 0.9 %
ERYTHROCYTE [DISTWIDTH] IN BLOOD BY AUTOMATED COUNT: 14.5 % (ref 10–15)
HCT VFR BLD AUTO: 37.9 % (ref 35–47)
HGB BLD-MCNC: 12.3 G/DL (ref 11.7–15.7)
IMM GRANULOCYTES # BLD: 0.1 10E9/L (ref 0–0.4)
IMM GRANULOCYTES NFR BLD: 0.5 %
LYMPHOCYTES # BLD AUTO: 2.6 10E9/L (ref 0.8–5.3)
LYMPHOCYTES NFR BLD AUTO: 22.1 %
MCH RBC QN AUTO: 29.4 PG (ref 26.5–33)
MCHC RBC AUTO-ENTMCNC: 32.5 G/DL (ref 31.5–36.5)
MCV RBC AUTO: 91 FL (ref 78–100)
MONOCYTES # BLD AUTO: 0.9 10E9/L (ref 0–1.3)
MONOCYTES NFR BLD AUTO: 7.5 %
NEUTROPHILS # BLD AUTO: 7.9 10E9/L (ref 1.6–8.3)
NEUTROPHILS NFR BLD AUTO: 68.7 %
NRBC # BLD AUTO: 0 10*3/UL
NRBC BLD AUTO-RTO: 0 /100
OPIATES UR QL SCN: NEGATIVE
PCP UR QL SCN: NEGATIVE
PLATELET # BLD AUTO: 307 10E9/L (ref 150–450)
RBC # BLD AUTO: 4.19 10E12/L (ref 3.8–5.2)
SPECIMEN EXP DATE BLD: NORMAL
WBC # BLD AUTO: 11.6 10E9/L (ref 4–11)

## 2019-08-19 PROCEDURE — 3E0P7VZ INTRODUCTION OF HORMONE INTO FEMALE REPRODUCTIVE, VIA NATURAL OR ARTIFICIAL OPENING: ICD-10-PCS | Performed by: OBSTETRICS & GYNECOLOGY

## 2019-08-19 PROCEDURE — 25000132 ZZH RX MED GY IP 250 OP 250 PS 637: Performed by: OBSTETRICS & GYNECOLOGY

## 2019-08-19 PROCEDURE — 86901 BLOOD TYPING SEROLOGIC RH(D): CPT | Performed by: OBSTETRICS & GYNECOLOGY

## 2019-08-19 PROCEDURE — 99221 1ST HOSP IP/OBS SF/LOW 40: CPT | Performed by: OBSTETRICS & GYNECOLOGY

## 2019-08-19 PROCEDURE — 86780 TREPONEMA PALLIDUM: CPT | Performed by: OBSTETRICS & GYNECOLOGY

## 2019-08-19 PROCEDURE — 12000000 ZZH R&B MED SURG/OB

## 2019-08-19 PROCEDURE — 85025 COMPLETE CBC W/AUTO DIFF WBC: CPT | Performed by: OBSTETRICS & GYNECOLOGY

## 2019-08-19 PROCEDURE — 86850 RBC ANTIBODY SCREEN: CPT | Performed by: OBSTETRICS & GYNECOLOGY

## 2019-08-19 PROCEDURE — 86900 BLOOD TYPING SEROLOGIC ABO: CPT | Performed by: OBSTETRICS & GYNECOLOGY

## 2019-08-19 PROCEDURE — 80307 DRUG TEST PRSMV CHEM ANLYZR: CPT | Performed by: OBSTETRICS & GYNECOLOGY

## 2019-08-19 RX ORDER — METHYLERGONOVINE MALEATE 0.2 MG/ML
200 INJECTION INTRAVENOUS
Status: DISCONTINUED | OUTPATIENT
Start: 2019-08-19 | End: 2019-08-21

## 2019-08-19 RX ORDER — ACETAMINOPHEN 325 MG/1
650 TABLET ORAL EVERY 4 HOURS PRN
Status: DISCONTINUED | OUTPATIENT
Start: 2019-08-19 | End: 2019-08-21

## 2019-08-19 RX ORDER — OXYCODONE AND ACETAMINOPHEN 5; 325 MG/1; MG/1
1 TABLET ORAL
Status: DISCONTINUED | OUTPATIENT
Start: 2019-08-19 | End: 2019-08-21

## 2019-08-19 RX ORDER — IBUPROFEN 800 MG/1
800 TABLET, FILM COATED ORAL
Status: DISCONTINUED | OUTPATIENT
Start: 2019-08-19 | End: 2019-08-21

## 2019-08-19 RX ORDER — OXYTOCIN/0.9 % SODIUM CHLORIDE 30/500 ML
1-24 PLASTIC BAG, INJECTION (ML) INTRAVENOUS CONTINUOUS
Status: DISCONTINUED | OUTPATIENT
Start: 2019-08-19 | End: 2019-08-21

## 2019-08-19 RX ORDER — LIDOCAINE 40 MG/G
CREAM TOPICAL
Status: DISCONTINUED | OUTPATIENT
Start: 2019-08-19 | End: 2019-08-21

## 2019-08-19 RX ORDER — OXYTOCIN 10 [USP'U]/ML
10 INJECTION, SOLUTION INTRAMUSCULAR; INTRAVENOUS
Status: DISCONTINUED | OUTPATIENT
Start: 2019-08-19 | End: 2019-08-21

## 2019-08-19 RX ORDER — SODIUM CHLORIDE, SODIUM LACTATE, POTASSIUM CHLORIDE, CALCIUM CHLORIDE 600; 310; 30; 20 MG/100ML; MG/100ML; MG/100ML; MG/100ML
INJECTION, SOLUTION INTRAVENOUS CONTINUOUS
Status: DISCONTINUED | OUTPATIENT
Start: 2019-08-19 | End: 2019-08-21

## 2019-08-19 RX ORDER — PRENATAL VIT/IRON FUM/FOLIC AC 27MG-0.8MG
1 TABLET ORAL DAILY
Status: DISCONTINUED | OUTPATIENT
Start: 2019-08-20 | End: 2019-08-21

## 2019-08-19 RX ORDER — OXYTOCIN/0.9 % SODIUM CHLORIDE 30/500 ML
100-340 PLASTIC BAG, INJECTION (ML) INTRAVENOUS CONTINUOUS PRN
Status: COMPLETED | OUTPATIENT
Start: 2019-08-19 | End: 2019-08-20

## 2019-08-19 RX ORDER — CARBOPROST TROMETHAMINE 250 UG/ML
250 INJECTION, SOLUTION INTRAMUSCULAR
Status: DISCONTINUED | OUTPATIENT
Start: 2019-08-19 | End: 2019-08-21

## 2019-08-19 RX ORDER — HYDROXYZINE HYDROCHLORIDE 50 MG/1
50 TABLET, FILM COATED ORAL EVERY 6 HOURS PRN
Status: DISCONTINUED | OUTPATIENT
Start: 2019-08-19 | End: 2019-08-21

## 2019-08-19 RX ORDER — NALOXONE HYDROCHLORIDE 0.4 MG/ML
.1-.4 INJECTION, SOLUTION INTRAMUSCULAR; INTRAVENOUS; SUBCUTANEOUS
Status: DISCONTINUED | OUTPATIENT
Start: 2019-08-19 | End: 2019-08-20

## 2019-08-19 RX ORDER — ONDANSETRON 2 MG/ML
4 INJECTION INTRAMUSCULAR; INTRAVENOUS EVERY 6 HOURS PRN
Status: DISCONTINUED | OUTPATIENT
Start: 2019-08-19 | End: 2019-08-20

## 2019-08-19 RX ORDER — HYDROXYZINE HYDROCHLORIDE 50 MG/1
100 TABLET, FILM COATED ORAL EVERY 6 HOURS PRN
Status: DISCONTINUED | OUTPATIENT
Start: 2019-08-19 | End: 2019-08-21

## 2019-08-19 RX ADMIN — DINOPROSTONE 10 MG: 10 INSERT VAGINAL at 20:47

## 2019-08-19 RX ADMIN — HYDROXYZINE HYDROCHLORIDE 100 MG: 50 TABLET, FILM COATED ORAL at 23:16

## 2019-08-19 ASSESSMENT — ACTIVITIES OF DAILY LIVING (ADL)
BATHING: 0-->INDEPENDENT
AMBULATION: 0-->INDEPENDENT
RETIRED_COMMUNICATION: 0-->UNDERSTANDS/COMMUNICATES WITHOUT DIFFICULTY
TRANSFERRING: 0-->INDEPENDENT
RETIRED_EATING: 0-->INDEPENDENT
TOILETING: 0-->INDEPENDENT
DRESS: 0-->INDEPENDENT
SWALLOWING: 0-->SWALLOWS FOODS/LIQUIDS WITHOUT DIFFICULTY
COGNITION: 0 - NO COGNITION ISSUES REPORTED
FALL_HISTORY_WITHIN_LAST_SIX_MONTHS: NO

## 2019-08-19 ASSESSMENT — MIFFLIN-ST. JEOR: SCORE: 1793.27

## 2019-08-19 NOTE — TELEPHONE ENCOUNTER
Patient updated questions    PHQ-9 SCORE 8/23/2018 11/2/2018 8/19/2019   PHQ-9 Total Score - - -   PHQ-9 Total Score MyChart - - 0   PHQ-9 Total Score 4 12 0     ALTAGRACIA-7 SCORE 8/23/2018 11/2/2018 8/19/2019   Total Score - - -   Total Score - - 0 (minimal anxiety)   Total Score 3 9 0       Saima Ferreira, [facility]

## 2019-08-20 ENCOUNTER — ANESTHESIA EVENT (OUTPATIENT)
Dept: OBGYN | Facility: CLINIC | Age: 24
End: 2019-08-20
Payer: COMMERCIAL

## 2019-08-20 ENCOUNTER — ANESTHESIA (OUTPATIENT)
Dept: SURGERY | Facility: CLINIC | Age: 24
End: 2019-08-20
Payer: COMMERCIAL

## 2019-08-20 ENCOUNTER — ANESTHESIA EVENT (OUTPATIENT)
Dept: SURGERY | Facility: CLINIC | Age: 24
End: 2019-08-20
Payer: COMMERCIAL

## 2019-08-20 ENCOUNTER — ANESTHESIA (OUTPATIENT)
Dept: OBGYN | Facility: CLINIC | Age: 24
End: 2019-08-20
Payer: COMMERCIAL

## 2019-08-20 LAB — T PALLIDUM AB SER QL: NONREACTIVE

## 2019-08-20 PROCEDURE — 25800030 ZZH RX IP 258 OP 636: Performed by: OBSTETRICS & GYNECOLOGY

## 2019-08-20 PROCEDURE — 36000056 ZZH SURGERY LEVEL 3 1ST 30 MIN: Performed by: OBSTETRICS & GYNECOLOGY

## 2019-08-20 PROCEDURE — 71000012 ZZH RECOVERY PHASE 1 LEVEL 1 FIRST HR: Performed by: OBSTETRICS & GYNECOLOGY

## 2019-08-20 PROCEDURE — 25800030 ZZH RX IP 258 OP 636: Performed by: NURSE ANESTHETIST, CERTIFIED REGISTERED

## 2019-08-20 PROCEDURE — 12000000 ZZH R&B MED SURG/OB

## 2019-08-20 PROCEDURE — 71000027 ZZH RECOVERY PHASE 2 EACH 15 MINS: Performed by: OBSTETRICS & GYNECOLOGY

## 2019-08-20 PROCEDURE — 25000128 H RX IP 250 OP 636: Performed by: NURSE ANESTHETIST, CERTIFIED REGISTERED

## 2019-08-20 PROCEDURE — C1765 ADHESION BARRIER: HCPCS | Performed by: OBSTETRICS & GYNECOLOGY

## 2019-08-20 PROCEDURE — 36000058 ZZH SURGERY LEVEL 3 EA 15 ADDTL MIN: Performed by: OBSTETRICS & GYNECOLOGY

## 2019-08-20 PROCEDURE — 71000013 ZZH RECOVERY PHASE 1 LEVEL 1 EA ADDTL HR: Performed by: OBSTETRICS & GYNECOLOGY

## 2019-08-20 PROCEDURE — 37000008 ZZH ANESTHESIA TECHNICAL FEE, 1ST 30 MIN: Performed by: OBSTETRICS & GYNECOLOGY

## 2019-08-20 PROCEDURE — 40000671 ZZH STATISTIC ANESTHESIA CASE

## 2019-08-20 PROCEDURE — 25000128 H RX IP 250 OP 636: Performed by: OBSTETRICS & GYNECOLOGY

## 2019-08-20 PROCEDURE — 25000132 ZZH RX MED GY IP 250 OP 250 PS 637: Performed by: OBSTETRICS & GYNECOLOGY

## 2019-08-20 PROCEDURE — 25000125 ZZHC RX 250: Performed by: OBSTETRICS & GYNECOLOGY

## 2019-08-20 PROCEDURE — 27110038 ZZH RX 271: Performed by: NURSE ANESTHETIST, CERTIFIED REGISTERED

## 2019-08-20 PROCEDURE — 25000125 ZZHC RX 250: Performed by: NURSE ANESTHETIST, CERTIFIED REGISTERED

## 2019-08-20 PROCEDURE — 37000011 ZZH ANESTHESIA WARD SERVICE: Performed by: NURSE ANESTHETIST, CERTIFIED REGISTERED

## 2019-08-20 PROCEDURE — 3E033VJ INTRODUCTION OF OTHER HORMONE INTO PERIPHERAL VEIN, PERCUTANEOUS APPROACH: ICD-10-PCS | Performed by: OBSTETRICS & GYNECOLOGY

## 2019-08-20 PROCEDURE — 37000009 ZZH ANESTHESIA TECHNICAL FEE, EACH ADDTL 15 MIN: Performed by: OBSTETRICS & GYNECOLOGY

## 2019-08-20 PROCEDURE — 27210794 ZZH OR GENERAL SUPPLY STERILE: Performed by: OBSTETRICS & GYNECOLOGY

## 2019-08-20 PROCEDURE — 27110028 ZZH OR GENERAL SUPPLY NON-STERILE: Performed by: OBSTETRICS & GYNECOLOGY

## 2019-08-20 RX ORDER — CEFAZOLIN SODIUM 1 G/50ML
1 INJECTION, SOLUTION INTRAVENOUS SEE ADMIN INSTRUCTIONS
Status: DISCONTINUED | OUTPATIENT
Start: 2019-08-20 | End: 2019-08-21

## 2019-08-20 RX ORDER — ONDANSETRON 4 MG/1
4 TABLET, ORALLY DISINTEGRATING ORAL EVERY 6 HOURS PRN
Status: DISCONTINUED | OUTPATIENT
Start: 2019-08-20 | End: 2019-08-23 | Stop reason: HOSPADM

## 2019-08-20 RX ORDER — LIDOCAINE HYDROCHLORIDE 10 MG/ML
INJECTION, SOLUTION INFILTRATION; PERINEURAL PRN
Status: DISCONTINUED | OUTPATIENT
Start: 2019-08-20 | End: 2019-08-20

## 2019-08-20 RX ORDER — EPHEDRINE SULFATE 50 MG/ML
5 INJECTION, SOLUTION INTRAMUSCULAR; INTRAVENOUS; SUBCUTANEOUS
Status: DISCONTINUED | OUTPATIENT
Start: 2019-08-20 | End: 2019-08-23 | Stop reason: HOSPADM

## 2019-08-20 RX ORDER — EPHEDRINE SULFATE 50 MG/ML
INJECTION, SOLUTION INTRAMUSCULAR; INTRAVENOUS; SUBCUTANEOUS
Status: DISCONTINUED
Start: 2019-08-20 | End: 2019-08-20 | Stop reason: WASHOUT

## 2019-08-20 RX ORDER — BUPIVACAINE HYDROCHLORIDE 2.5 MG/ML
INJECTION, SOLUTION EPIDURAL; INFILTRATION; INTRACAUDAL
Status: COMPLETED
Start: 2019-08-20 | End: 2019-08-20

## 2019-08-20 RX ORDER — LIDOCAINE HCL/EPINEPHRINE/PF 2%-1:200K
VIAL (ML) INJECTION PRN
Status: DISCONTINUED | OUTPATIENT
Start: 2019-08-20 | End: 2019-08-21

## 2019-08-20 RX ORDER — ATROPINE SULFATE 0.4 MG/ML
AMPUL (ML) INJECTION PRN
Status: DISCONTINUED | OUTPATIENT
Start: 2019-08-20 | End: 2019-08-21

## 2019-08-20 RX ORDER — LIDOCAINE HYDROCHLORIDE AND EPINEPHRINE 15; 5 MG/ML; UG/ML
INJECTION, SOLUTION EPIDURAL PRN
Status: DISCONTINUED | OUTPATIENT
Start: 2019-08-20 | End: 2019-08-20

## 2019-08-20 RX ORDER — PHENYLEPHRINE HYDROCHLORIDE 10 MG/ML
INJECTION INTRAVENOUS PRN
Status: DISCONTINUED | OUTPATIENT
Start: 2019-08-20 | End: 2019-08-21

## 2019-08-20 RX ORDER — FENTANYL CITRATE 50 UG/ML
INJECTION, SOLUTION INTRAMUSCULAR; INTRAVENOUS
Status: COMPLETED
Start: 2019-08-20 | End: 2019-08-20

## 2019-08-20 RX ORDER — NALOXONE HYDROCHLORIDE 0.4 MG/ML
.1-.4 INJECTION, SOLUTION INTRAMUSCULAR; INTRAVENOUS; SUBCUTANEOUS
Status: DISCONTINUED | OUTPATIENT
Start: 2019-08-20 | End: 2019-08-23 | Stop reason: HOSPADM

## 2019-08-20 RX ORDER — DIPHENHYDRAMINE HYDROCHLORIDE 50 MG/ML
25 INJECTION INTRAMUSCULAR; INTRAVENOUS EVERY 6 HOURS PRN
Status: DISCONTINUED | OUTPATIENT
Start: 2019-08-20 | End: 2019-08-23 | Stop reason: HOSPADM

## 2019-08-20 RX ORDER — DIPHENHYDRAMINE HCL 25 MG
25 CAPSULE ORAL EVERY 6 HOURS PRN
Status: DISCONTINUED | OUTPATIENT
Start: 2019-08-20 | End: 2019-08-23 | Stop reason: HOSPADM

## 2019-08-20 RX ORDER — LIDOCAINE 40 MG/G
CREAM TOPICAL
Status: DISCONTINUED | OUTPATIENT
Start: 2019-08-20 | End: 2019-08-21

## 2019-08-20 RX ORDER — FENTANYL CITRATE 50 UG/ML
INJECTION, SOLUTION INTRAMUSCULAR; INTRAVENOUS PRN
Status: DISCONTINUED | OUTPATIENT
Start: 2019-08-20 | End: 2019-08-20

## 2019-08-20 RX ORDER — BUPIVACAINE HYDROCHLORIDE 2.5 MG/ML
INJECTION, SOLUTION EPIDURAL; INFILTRATION; INTRACAUDAL PRN
Status: DISCONTINUED | OUTPATIENT
Start: 2019-08-20 | End: 2019-08-20

## 2019-08-20 RX ORDER — SODIUM CHLORIDE, SODIUM LACTATE, POTASSIUM CHLORIDE, CALCIUM CHLORIDE 600; 310; 30; 20 MG/100ML; MG/100ML; MG/100ML; MG/100ML
INJECTION, SOLUTION INTRAVENOUS CONTINUOUS
Status: DISCONTINUED | OUTPATIENT
Start: 2019-08-20 | End: 2019-08-21

## 2019-08-20 RX ORDER — CEFAZOLIN SODIUM 2 G/100ML
2 INJECTION, SOLUTION INTRAVENOUS
Status: COMPLETED | OUTPATIENT
Start: 2019-08-20 | End: 2019-08-20

## 2019-08-20 RX ORDER — FENTANYL CITRATE 50 UG/ML
INJECTION, SOLUTION INTRAMUSCULAR; INTRAVENOUS PRN
Status: DISCONTINUED | OUTPATIENT
Start: 2019-08-20 | End: 2019-08-21

## 2019-08-20 RX ORDER — CITRIC ACID/SODIUM CITRATE 334-500MG
30 SOLUTION, ORAL ORAL
Status: COMPLETED | OUTPATIENT
Start: 2019-08-20 | End: 2019-08-20

## 2019-08-20 RX ORDER — ONDANSETRON 2 MG/ML
4 INJECTION INTRAMUSCULAR; INTRAVENOUS EVERY 6 HOURS PRN
Status: DISCONTINUED | OUTPATIENT
Start: 2019-08-20 | End: 2019-08-23 | Stop reason: HOSPADM

## 2019-08-20 RX ORDER — LIDOCAINE HYDROCHLORIDE 10 MG/ML
INJECTION, SOLUTION EPIDURAL; INFILTRATION; INTRACAUDAL; PERINEURAL
Status: COMPLETED
Start: 2019-08-20 | End: 2019-08-20

## 2019-08-20 RX ADMIN — FENTANYL CITRATE 100 MCG: 50 INJECTION, SOLUTION INTRAMUSCULAR; INTRAVENOUS at 23:55

## 2019-08-20 RX ADMIN — LIDOCAINE HYDROCHLORIDE AND EPINEPHRINE 45 MG: 15; 5 INJECTION, SOLUTION EPIDURAL at 10:46

## 2019-08-20 RX ADMIN — SODIUM CHLORIDE, POTASSIUM CHLORIDE, SODIUM LACTATE AND CALCIUM CHLORIDE 500 ML: 600; 310; 30; 20 INJECTION, SOLUTION INTRAVENOUS at 21:17

## 2019-08-20 RX ADMIN — ATROPINE SULFATE 0.4 MG: 0.4 INJECTION, SOLUTION INTRAMUSCULAR; INTRAVENOUS; SUBCUTANEOUS at 23:11

## 2019-08-20 RX ADMIN — SERTRALINE HYDROCHLORIDE 50 MG: 50 TABLET ORAL at 08:22

## 2019-08-20 RX ADMIN — SODIUM CHLORIDE, POTASSIUM CHLORIDE, SODIUM LACTATE AND CALCIUM CHLORIDE: 600; 310; 30; 20 INJECTION, SOLUTION INTRAVENOUS at 14:03

## 2019-08-20 RX ADMIN — LIDOCAINE HYDROCHLORIDE,EPINEPHRINE BITARTRATE 3 ML: 20; .005 INJECTION, SOLUTION EPIDURAL; INFILTRATION; INTRACAUDAL; PERINEURAL at 23:02

## 2019-08-20 RX ADMIN — CEFAZOLIN SODIUM 2 G: 2 INJECTION, SOLUTION INTRAVENOUS at 22:30

## 2019-08-20 RX ADMIN — SODIUM CHLORIDE, POTASSIUM CHLORIDE, SODIUM LACTATE AND CALCIUM CHLORIDE: 600; 310; 30; 20 INJECTION, SOLUTION INTRAVENOUS at 23:02

## 2019-08-20 RX ADMIN — SODIUM CHLORIDE, POTASSIUM CHLORIDE, SODIUM LACTATE AND CALCIUM CHLORIDE 500 ML: 600; 310; 30; 20 INJECTION, SOLUTION INTRAVENOUS at 10:05

## 2019-08-20 RX ADMIN — PHENYLEPHRINE HYDROCHLORIDE 200 MCG: 10 INJECTION INTRAVENOUS at 23:13

## 2019-08-20 RX ADMIN — ONDANSETRON 4 MG: 2 INJECTION INTRAMUSCULAR; INTRAVENOUS at 22:44

## 2019-08-20 RX ADMIN — LIDOCAINE HYDROCHLORIDE 90 MG: 10 INJECTION, SOLUTION INFILTRATION; PERINEURAL at 10:32

## 2019-08-20 RX ADMIN — Medication 2 MILLI-UNITS/MIN: at 09:51

## 2019-08-20 RX ADMIN — SODIUM CHLORIDE, POTASSIUM CHLORIDE, SODIUM LACTATE AND CALCIUM CHLORIDE: 600; 310; 30; 20 INJECTION, SOLUTION INTRAVENOUS at 09:51

## 2019-08-20 RX ADMIN — LIDOCAINE HYDROCHLORIDE,EPINEPHRINE BITARTRATE 7 ML: 20; .005 INJECTION, SOLUTION EPIDURAL; INFILTRATION; INTRACAUDAL; PERINEURAL at 22:58

## 2019-08-20 RX ADMIN — Medication 10 ML/HR: at 11:00

## 2019-08-20 RX ADMIN — BUPIVACAINE HYDROCHLORIDE 5 ML: 2.5 INJECTION, SOLUTION EPIDURAL; INFILTRATION; INTRACAUDAL at 10:56

## 2019-08-20 RX ADMIN — Medication 500 ML: at 23:26

## 2019-08-20 RX ADMIN — SODIUM BICARBONATE 1 MEQ: 84 INJECTION, SOLUTION INTRAVENOUS at 10:32

## 2019-08-20 RX ADMIN — BUPIVACAINE HYDROCHLORIDE 5 ML: 2.5 INJECTION, SOLUTION EPIDURAL; INFILTRATION; INTRACAUDAL at 10:51

## 2019-08-20 RX ADMIN — FENTANYL CITRATE 100 MCG: 50 INJECTION, SOLUTION INTRAMUSCULAR; INTRAVENOUS at 10:51

## 2019-08-20 RX ADMIN — AZITHROMYCIN MONOHYDRATE 500 MG: 500 INJECTION, POWDER, LYOPHILIZED, FOR SOLUTION INTRAVENOUS at 22:45

## 2019-08-20 RX ADMIN — SODIUM CITRATE AND CITRIC ACID MONOHYDRATE 30 ML: 500; 334 SOLUTION ORAL at 22:30

## 2019-08-20 ASSESSMENT — LIFESTYLE VARIABLES: TOBACCO_USE: 1

## 2019-08-20 NOTE — H&P
[facility] Labor and Delivery History and Physical    Saundra Urrutia MRN# [id number]   Age: 23 year old YOB: 1995     Date of Admission:  2019    Primary care provider: Karla Wilson           Chief Complaint:   Saundra Urrutia is a 23 year old female who is 39w5d pregnant and being admitted for induction of labor, indication maternal request.          Pregnancy history:     OBSTETRIC HISTORY:    OB History    Para Term  AB Living   1 0 0 0 0 0   SAB TAB Ectopic Multiple Live Births   0 0 0 0 0      # Outcome Date GA Lbr Jose/2nd Weight Sex Delivery Anes PTL Lv   1 Current                EDC: Estimated Date of Delivery: 19    Prenatal Labs:   Lab Results   Component Value Date    ABO O 2019    RH Pos 2019    AS Neg 2019    HEPBANG Nonreactive 2019    CHPCRT Negative 2019    GCPCRT Negative 2019    HGB 12.3 2019       GBS Status:   Lab Results   Component Value Date    GBS Negative 2019       Active Problem List  Patient Active Problem List   Diagnosis     HL (hearing loss)     Self-injurious behavior     Hyperhidrosis     Obesity     Bilateral carpal tunnel syndrome     Major depressive disorder, recurrent episode, moderate (H)     Pregnancy, supervision of first, third trimester     Encounter for supervision of normal first pregnancy in third trimester       Medication Prior to Admission  Medications Prior to Admission   Medication Sig Dispense Refill Last Dose     Prenatal Vit-Fe Fumarate-FA (PRENATAL MULTIVITAMIN W/IRON) 27-0.8 MG tablet Take 1 tablet by mouth daily   2019 at Unknown time     sertraline (ZOLOFT) 50 MG tablet Take 1 tablet (50 mg) by mouth daily 30 tablet 3 2019 at Unknown time     order for DME Equipment being ordered: Splint 1 Units 0 Taking   .        Maternal Past Medical History:     Past Medical History:   Diagnosis Date     Anxiety      Depression      Depressive disorder      taking meds                       Family History:   I have reviewed this patient's family history            Social History:   I have reviewed this patient's social history         Review of Systems:   The Review of Systems is negative other than noted in the HPI          Physical Exam:   Vitals were reviewed  All vitals stable  Temp: 98.3  F (36.8  C) Temp src: Oral BP: 132/77     Resp: 18        Constitutional:   awake, alert, cooperative, no apparent distress, and appears stated age     Lungs:   No increased work of breathing, good air exchange, clear to auscultation bilaterally, no crackles or wheezing     Cardiovascular:   Normal apical impulse, regular rate and rhythm, normal S1 and S2, no S3 or S4, and no murmur noted     Abdomen:   No scars, normal bowel sounds, soft, non-distended, non-tender, no masses palpated, no hepatosplenomegaly Gravid      Genitounirinary:   External Genitalia:  General appearance; normal     Musculoskeletal:   There is no redness, warmth, or swelling of the joints.  Full range of motion noted.  Motor strength is 5 out of 5 all extremities bilaterally.  Tone is normal.     Neurologic:   Awake, alert, oriented to name, place and time.  Cranial nerves II-XII are grossly intact.  Motor is 5 out of 5 bilaterally.  Cerebellar finger to nose, heel to shin intact.  Sensory is intact.  Babinski down going, Romberg negative, and gait is normal.     Neuropsychiatric:   General: normal, calm and normal eye contact  Level of consciousness: alert / normal  Affect: normal  Orientation: oriented to self, place, time and situation  Memory and insight: normal, memory for past and recent events intact and thought process normal     Skin:   no bruising or bleeding, normal skin color, texture, turgor and no redness, warmth, or swelling      Cervix:   Membranes: intact   Dilation: 1.5   Effacement: 70%   Station:-3   Consistency: soft   Position: Posterior  Presentation:Cephalic  Fetal Heart Rate  Tracing: reactive and reassuring, appropriate for gestational age, Tier 1 (normal)  Tocometer: external monitor and inadequate                       Assessment:   Saundra Urrutia is a 39w5d  By 11 week ULTRASOUND pregnant female admitted with induction of labor, indication maternal request.          Plan:   Admit - see IP orders  cervical ripening with cervidil  Labor induction with cervidil/ pitocin  Anticipate   Discussed with Saundra Urrutia, the following; indications; the agents and methods of labor augmentation, including risks, benefits, and alternative approaches; and the possible need for  birth. EFW is AGA.    The Labor Induction:what you need to know information sheet was made available to her. Questions and concerns were addressed and patient agrees to above if necessary during the course of her labor.    MD Eduard Cornejo MD

## 2019-08-20 NOTE — ANESTHESIA PROCEDURE NOTES
Peripheral nerve/Neuraxial procedure note : epidural catheter  Pre-Procedure  Performed by  Tyler Messer APRN CRNA   Location: OB    Procedure Times:8/20/2019 10:32 AM and 8/20/2019 10:44 AM  Pre-Anesthestic Checklist: patient identified, IV checked, risks and benefits discussed, informed consent, monitors and equipment checked, pre-op evaluation and at physician/surgeon's request    Timeout  Correct Patient: Yes   Correct Procedure: Yes   Correct Site: Yes   Correct Laterality: N/A   Correct Position: Yes   Site Marked: N/A   .   Procedure Documentation    Diagnosis:Active Labor.    Procedure:    Epidural catheter.  Insertion Site:L3-4  (midline approach) Injection technique: LORT saline   Local skin infiltrated with 9 mL of 1% lidocaine.  IRENE at 8 cm     Patient Prep;mask, sterile gloves, patient draped.  .  Needle: Touhy needle Needle Gauge: 17.    Needle Length (Inches) 3.5  # of attempts: 1 and  # of redirects:  1 .   Catheter: 19 G . .  Catheter threaded easily  4 cm epidural space.  12 cm at skin.   .    Assessment/Narrative  Paresthesias: No.  .  .  Aspiration negative for heme or CSF  . Test dose of 3 mL lidocaine 1.5% w/ 1:200,000 epinephrine at 10:46.  Test dose negative for signs of intravascular, subdural or intrathecal injection. Comments:  VAS pain score prior to epidural:  9    VAS pain score after epidural:  0    Pt. Tolerated well, FHR stable.

## 2019-08-20 NOTE — ANESTHESIA PREPROCEDURE EVALUATION
"Anesthesia Pre-Procedure Evaluation    Patient: Saundra Urrutia   MRN: [id number] : 1995          Preoperative Diagnosis: * No surgery found *        Past Medical History:   Diagnosis Date     Anxiety      Depression      Depressive disorder     taking meds     Past Surgical History:   Procedure Laterality Date     NO HISTORY OF SURGERY         Anesthesia Evaluation       history and physical reviewed .             ROS/MED HX    ENT/Pulmonary:  - neg pulmonary ROS     Neurologic:  - neg neurologic ROS     Cardiovascular:  - neg cardiovascular ROS       METS/Exercise Tolerance:     Hematologic:         Musculoskeletal:         GI/Hepatic:  - neg GI/hepatic ROS       Renal/Genitourinary:         Endo:         Psychiatric:         Infectious Disease:         Malignancy:         Other:                     neg OB ROS            Physical Exam  Normal systems: cardiovascular, pulmonary and dental    Airway   Mallampati: II  TM distance: > 3 FB  Neck ROM: full  Mouth opening: > 3 cm    Dental     Cardiovascular       Pulmonary             Lab Results   Component Value Date    WBC 11.6 (H) 2019    HGB 12.3 2019    HCT 37.9 2019     2019    CR 0.59 08/15/2019    ALT 27 08/15/2019    AST 12 08/15/2019    TSH 0.96 2013    HCG Negative 2012       Preop Vitals  BP Readings from Last 3 Encounters:   19 123/60   08/15/19 134/71   19 131/84    Pulse Readings from Last 3 Encounters:   08/15/19 (P) 94   19 123   19 98      Resp Readings from Last 3 Encounters:   19 18   08/15/19 16   19 18    SpO2 Readings from Last 3 Encounters:   19 99%   19 97%   09/20/15 97%      Temp Readings from Last 1 Encounters:   19 36.8  C (98.3  F) (Oral)    Ht Readings from Last 1 Encounters:   19 1.575 m (5' 2\")      Wt Readings from Last 1 Encounters:   19 107.5 kg (237 lb)    Estimated body mass index is 43.35 kg/m  as calculated from " "the following:    Height as of this encounter: 1.575 m (5' 2\").    Weight as of this encounter: 107.5 kg (237 lb).       Anesthesia Plan      History & Physical Review      ASA Status:  .  OB Epidural Asa: 2            Postoperative Care      Consents  Anesthetic plan, risks, benefits and alternatives discussed with:  Patient..                 ALBA Morfin CRNA  "

## 2019-08-20 NOTE — PROGRESS NOTES
Late entry due to pt care 1920 pt admitted to room 2054 for cervidil induction.  Pt placed on EFM, VS, assessment and history completed.    2018 with pt consent collected UDS and sent due to admitted use last in the beginning of pregnacy 12/2018.  Pt informed that infant will also be tested after birth.

## 2019-08-20 NOTE — PROGRESS NOTES
Dr. Gramajo notified that fluid has now become meconium stained.  No significant cervical change over last four hours.  Tier 1 tracing. Afebrile.  Remains on pitocin.

## 2019-08-20 NOTE — PROGRESS NOTES
While the pt was in the tub, the sugey was not picking up baby well. From 9943-4790- tracing was not accurate.  When she got out of the tub, the sugey patches were changed, and picked up well.  Pit was started.  Pt is requesting the epidural to be placed and dosed.

## 2019-08-20 NOTE — PROGRESS NOTES
Verified that the provider has discussed with Saundra Urrutia, the following; indications; the agents and methods of labor induction or augmentation, including risks, benefits, and alternative approached; and the possible need for repeat induction or  birth. Questions and concerns were addressed and patient agrees to above. Instructed regarding continuous fetal monitoring and BRP's prn if FHR/uterine activity stable.

## 2019-08-20 NOTE — PROGRESS NOTES
Pt's burgess score is 6. Dr. Webster was informed.  MD stated to go ahead and start pit, due to the fact that she had cervical change, even though her burgess score is not at least an 8.  Pit will be started at 0945, an hour after the cervidil was removed.  Pt is currently in the tub.

## 2019-08-20 NOTE — PROGRESS NOTES
Pt is 3-4/70-80/-2. Pt has been turned different positions and also using the peanut ball.  Pt's pit was not increased due to some late decels.  Pt's variability is moderate.  Report given to Sharmila [place] RN

## 2019-08-21 PROBLEM — Z98.891 S/P CESAREAN SECTION: Status: ACTIVE | Noted: 2019-08-21

## 2019-08-21 PROCEDURE — 25000128 H RX IP 250 OP 636: Performed by: OBSTETRICS & GYNECOLOGY

## 2019-08-21 PROCEDURE — 25000125 ZZHC RX 250: Performed by: OBSTETRICS & GYNECOLOGY

## 2019-08-21 PROCEDURE — 12000000 ZZH R&B MED SURG/OB

## 2019-08-21 PROCEDURE — 59510 CESAREAN DELIVERY: CPT | Performed by: OBSTETRICS & GYNECOLOGY

## 2019-08-21 PROCEDURE — 25000132 ZZH RX MED GY IP 250 OP 250 PS 637: Performed by: OBSTETRICS & GYNECOLOGY

## 2019-08-21 PROCEDURE — 59514 CESAREAN DELIVERY ONLY: CPT | Mod: AS | Performed by: NURSE PRACTITIONER

## 2019-08-21 PROCEDURE — 25800030 ZZH RX IP 258 OP 636: Performed by: NURSE ANESTHETIST, CERTIFIED REGISTERED

## 2019-08-21 PROCEDURE — 25000128 H RX IP 250 OP 636: Performed by: NURSE ANESTHETIST, CERTIFIED REGISTERED

## 2019-08-21 RX ORDER — OXYCODONE HYDROCHLORIDE 5 MG/1
5-10 TABLET ORAL
Status: DISCONTINUED | OUTPATIENT
Start: 2019-08-21 | End: 2019-08-23 | Stop reason: HOSPADM

## 2019-08-21 RX ORDER — FENTANYL CITRATE 50 UG/ML
25-50 INJECTION, SOLUTION INTRAMUSCULAR; INTRAVENOUS
Status: DISCONTINUED | OUTPATIENT
Start: 2019-08-21 | End: 2019-08-23 | Stop reason: HOSPADM

## 2019-08-21 RX ORDER — BISACODYL 10 MG
10 SUPPOSITORY, RECTAL RECTAL DAILY PRN
Status: DISCONTINUED | OUTPATIENT
Start: 2019-08-23 | End: 2019-08-23 | Stop reason: HOSPADM

## 2019-08-21 RX ORDER — OXYTOCIN 10 [USP'U]/ML
10 INJECTION, SOLUTION INTRAMUSCULAR; INTRAVENOUS
Status: DISCONTINUED | OUTPATIENT
Start: 2019-08-21 | End: 2019-08-23 | Stop reason: HOSPADM

## 2019-08-21 RX ORDER — HYDRALAZINE HYDROCHLORIDE 20 MG/ML
2.5-5 INJECTION INTRAMUSCULAR; INTRAVENOUS EVERY 10 MIN PRN
Status: DISCONTINUED | OUTPATIENT
Start: 2019-08-21 | End: 2019-08-21

## 2019-08-21 RX ORDER — HYDROMORPHONE HYDROCHLORIDE 1 MG/ML
.3-.5 INJECTION, SOLUTION INTRAMUSCULAR; INTRAVENOUS; SUBCUTANEOUS EVERY 5 MIN PRN
Status: DISCONTINUED | OUTPATIENT
Start: 2019-08-21 | End: 2019-08-23 | Stop reason: HOSPADM

## 2019-08-21 RX ORDER — MAGNESIUM HYDROXIDE 1200 MG/15ML
LIQUID ORAL PRN
Status: DISCONTINUED | OUTPATIENT
Start: 2019-08-21 | End: 2019-08-23 | Stop reason: HOSPADM

## 2019-08-21 RX ORDER — ACETAMINOPHEN 325 MG/1
975 TABLET ORAL EVERY 8 HOURS
Status: DISCONTINUED | OUTPATIENT
Start: 2019-08-21 | End: 2019-08-23 | Stop reason: HOSPADM

## 2019-08-21 RX ORDER — ONDANSETRON 2 MG/ML
4 INJECTION INTRAMUSCULAR; INTRAVENOUS EVERY 6 HOURS PRN
Status: DISCONTINUED | OUTPATIENT
Start: 2019-08-21 | End: 2019-08-23 | Stop reason: HOSPADM

## 2019-08-21 RX ORDER — ONDANSETRON 2 MG/ML
4 INJECTION INTRAMUSCULAR; INTRAVENOUS EVERY 30 MIN PRN
Status: DISCONTINUED | OUTPATIENT
Start: 2019-08-21 | End: 2019-08-23 | Stop reason: HOSPADM

## 2019-08-21 RX ORDER — HYDROMORPHONE HYDROCHLORIDE 1 MG/ML
.3-.5 INJECTION, SOLUTION INTRAMUSCULAR; INTRAVENOUS; SUBCUTANEOUS EVERY 30 MIN PRN
Status: DISCONTINUED | OUTPATIENT
Start: 2019-08-21 | End: 2019-08-23 | Stop reason: HOSPADM

## 2019-08-21 RX ORDER — MEPERIDINE HYDROCHLORIDE 25 MG/ML
12.5 INJECTION INTRAMUSCULAR; INTRAVENOUS; SUBCUTANEOUS EVERY 5 MIN PRN
Status: DISCONTINUED | OUTPATIENT
Start: 2019-08-21 | End: 2019-08-21

## 2019-08-21 RX ORDER — NALOXONE HYDROCHLORIDE 0.4 MG/ML
.1-.4 INJECTION, SOLUTION INTRAMUSCULAR; INTRAVENOUS; SUBCUTANEOUS
Status: ACTIVE | OUTPATIENT
Start: 2019-08-21 | End: 2019-08-22

## 2019-08-21 RX ORDER — IBUPROFEN 800 MG/1
800 TABLET, FILM COATED ORAL EVERY 6 HOURS PRN
Status: DISCONTINUED | OUTPATIENT
Start: 2019-08-21 | End: 2019-08-23 | Stop reason: HOSPADM

## 2019-08-21 RX ORDER — KETOROLAC TROMETHAMINE 30 MG/ML
30 INJECTION, SOLUTION INTRAMUSCULAR; INTRAVENOUS EVERY 6 HOURS
Status: DISPENSED | OUTPATIENT
Start: 2019-08-21 | End: 2019-08-22

## 2019-08-21 RX ORDER — LANOLIN 100 %
OINTMENT (GRAM) TOPICAL
Status: DISCONTINUED | OUTPATIENT
Start: 2019-08-21 | End: 2019-08-23 | Stop reason: HOSPADM

## 2019-08-21 RX ORDER — SODIUM CHLORIDE, SODIUM LACTATE, POTASSIUM CHLORIDE, CALCIUM CHLORIDE 600; 310; 30; 20 MG/100ML; MG/100ML; MG/100ML; MG/100ML
INJECTION, SOLUTION INTRAVENOUS CONTINUOUS
Status: DISCONTINUED | OUTPATIENT
Start: 2019-08-21 | End: 2019-08-23 | Stop reason: HOSPADM

## 2019-08-21 RX ORDER — OXYTOCIN/0.9 % SODIUM CHLORIDE 30/500 ML
100 PLASTIC BAG, INJECTION (ML) INTRAVENOUS CONTINUOUS
Status: DISCONTINUED | OUTPATIENT
Start: 2019-08-21 | End: 2019-08-23 | Stop reason: HOSPADM

## 2019-08-21 RX ORDER — ACETAMINOPHEN 325 MG/1
650 TABLET ORAL EVERY 4 HOURS PRN
Status: DISCONTINUED | OUTPATIENT
Start: 2019-08-24 | End: 2019-08-23 | Stop reason: HOSPADM

## 2019-08-21 RX ORDER — DEXTROSE, SODIUM CHLORIDE, SODIUM LACTATE, POTASSIUM CHLORIDE, AND CALCIUM CHLORIDE 5; .6; .31; .03; .02 G/100ML; G/100ML; G/100ML; G/100ML; G/100ML
INJECTION, SOLUTION INTRAVENOUS CONTINUOUS
Status: DISCONTINUED | OUTPATIENT
Start: 2019-08-21 | End: 2019-08-23 | Stop reason: HOSPADM

## 2019-08-21 RX ORDER — HYDROCORTISONE 2.5 %
CREAM (GRAM) TOPICAL 3 TIMES DAILY PRN
Status: DISCONTINUED | OUTPATIENT
Start: 2019-08-21 | End: 2019-08-23 | Stop reason: HOSPADM

## 2019-08-21 RX ORDER — ALBUTEROL SULFATE 0.83 MG/ML
2.5 SOLUTION RESPIRATORY (INHALATION) EVERY 4 HOURS PRN
Status: DISCONTINUED | OUTPATIENT
Start: 2019-08-21 | End: 2019-08-23 | Stop reason: HOSPADM

## 2019-08-21 RX ORDER — KETOROLAC TROMETHAMINE 30 MG/ML
30 INJECTION, SOLUTION INTRAMUSCULAR; INTRAVENOUS
Status: COMPLETED | OUTPATIENT
Start: 2019-08-21 | End: 2019-08-21

## 2019-08-21 RX ORDER — LIDOCAINE 40 MG/G
CREAM TOPICAL
Status: DISCONTINUED | OUTPATIENT
Start: 2019-08-21 | End: 2019-08-23 | Stop reason: HOSPADM

## 2019-08-21 RX ORDER — NALOXONE HYDROCHLORIDE 0.4 MG/ML
.1-.4 INJECTION, SOLUTION INTRAMUSCULAR; INTRAVENOUS; SUBCUTANEOUS
Status: DISCONTINUED | OUTPATIENT
Start: 2019-08-21 | End: 2019-08-23 | Stop reason: HOSPADM

## 2019-08-21 RX ORDER — AMOXICILLIN 250 MG
1 CAPSULE ORAL 2 TIMES DAILY PRN
Status: DISCONTINUED | OUTPATIENT
Start: 2019-08-21 | End: 2019-08-23 | Stop reason: HOSPADM

## 2019-08-21 RX ORDER — OXYTOCIN/0.9 % SODIUM CHLORIDE 30/500 ML
340 PLASTIC BAG, INJECTION (ML) INTRAVENOUS CONTINUOUS PRN
Status: DISCONTINUED | OUTPATIENT
Start: 2019-08-21 | End: 2019-08-23 | Stop reason: HOSPADM

## 2019-08-21 RX ORDER — AMOXICILLIN 250 MG
2 CAPSULE ORAL 2 TIMES DAILY PRN
Status: DISCONTINUED | OUTPATIENT
Start: 2019-08-21 | End: 2019-08-23 | Stop reason: HOSPADM

## 2019-08-21 RX ORDER — SIMETHICONE 80 MG
80 TABLET,CHEWABLE ORAL 4 TIMES DAILY PRN
Status: DISCONTINUED | OUTPATIENT
Start: 2019-08-21 | End: 2019-08-23 | Stop reason: HOSPADM

## 2019-08-21 RX ORDER — ONDANSETRON 4 MG/1
4 TABLET, ORALLY DISINTEGRATING ORAL EVERY 30 MIN PRN
Status: DISCONTINUED | OUTPATIENT
Start: 2019-08-21 | End: 2019-08-23 | Stop reason: HOSPADM

## 2019-08-21 RX ADMIN — KETOROLAC TROMETHAMINE 30 MG: 30 INJECTION, SOLUTION INTRAMUSCULAR at 14:57

## 2019-08-21 RX ADMIN — KETOROLAC TROMETHAMINE 30 MG: 30 INJECTION, SOLUTION INTRAMUSCULAR at 08:21

## 2019-08-21 RX ADMIN — SENNOSIDES AND DOCUSATE SODIUM 1 TABLET: 8.6; 5 TABLET ORAL at 08:21

## 2019-08-21 RX ADMIN — OXYCODONE HYDROCHLORIDE 10 MG: 5 TABLET ORAL at 22:58

## 2019-08-21 RX ADMIN — SODIUM CHLORIDE, POTASSIUM CHLORIDE, SODIUM LACTATE AND CALCIUM CHLORIDE: 600; 310; 30; 20 INJECTION, SOLUTION INTRAVENOUS at 12:40

## 2019-08-21 RX ADMIN — ACETAMINOPHEN 975 MG: 325 TABLET, FILM COATED ORAL at 19:29

## 2019-08-21 RX ADMIN — SODIUM CHLORIDE, POTASSIUM CHLORIDE, SODIUM LACTATE AND CALCIUM CHLORIDE: 600; 310; 30; 20 INJECTION, SOLUTION INTRAVENOUS at 04:44

## 2019-08-21 RX ADMIN — SENNOSIDES AND DOCUSATE SODIUM 1 TABLET: 8.6; 5 TABLET ORAL at 19:29

## 2019-08-21 RX ADMIN — OXYCODONE HYDROCHLORIDE 10 MG: 5 TABLET ORAL at 12:31

## 2019-08-21 RX ADMIN — KETOROLAC TROMETHAMINE 30 MG: 30 INJECTION, SOLUTION INTRAMUSCULAR at 01:18

## 2019-08-21 RX ADMIN — OXYCODONE HYDROCHLORIDE 10 MG: 5 TABLET ORAL at 19:29

## 2019-08-21 RX ADMIN — OXYCODONE HYDROCHLORIDE 5 MG: 5 TABLET ORAL at 09:15

## 2019-08-21 RX ADMIN — ACETAMINOPHEN 975 MG: 325 TABLET, FILM COATED ORAL at 02:45

## 2019-08-21 RX ADMIN — OXYCODONE HYDROCHLORIDE 5 MG: 5 TABLET ORAL at 04:59

## 2019-08-21 RX ADMIN — ACETAMINOPHEN 975 MG: 325 TABLET, FILM COATED ORAL at 12:01

## 2019-08-21 RX ADMIN — OXYCODONE HYDROCHLORIDE 10 MG: 5 TABLET ORAL at 16:35

## 2019-08-21 RX ADMIN — KETOROLAC TROMETHAMINE 30 MG: 30 INJECTION, SOLUTION INTRAMUSCULAR at 21:01

## 2019-08-21 NOTE — PLAN OF CARE
S:Delivery  B:Induced  Labor,40w0d    Lab Results   Component Value Date    GBS Negative 2019    with antibiotic treatment not indicated 4 hours prior to delivery.  A: Patient delivered Primary C/S for  fetal intolerance at 2325 with Dr. JOSE ENRIQUE Gramajo in attendance and baby placed on mother's abdomen for delayed cord clamping. Baby dried and stimulated. Baby placed  skin to skin @ 2335.. Apgars 8/9.  IV infusion of Oxytocin  infused. Placenta removal spontaneous. MD does not want placenta sent to pathology.  See Flowsheet for VS and PP checks.  .  Labor care plan goals met, transition now to postpartum care.  R: Expect routine postpartum care. Anticipate first feeding within the hour or whenever infant displays feeding cues. Continue skin to skin. Prior discussion with mother indicates that feeding plan is Breast feeding . Educated mother on importance of exclusive breastfeeding, expected feeding readiness cues and encouraged her to observe for these cues while rooming in. Informed her that breastfeeding assistance would be provided.

## 2019-08-21 NOTE — BRIEF OP NOTE
[facility]  Brief Op Note    Patient Name:Saundra Urrutia  MRN: [id number]  YOB: 1995  Surgery Date: 2019    Surgeon: Eleanor Gramajo MD  Assistant: Akilah Hayward CNP APRN       Pre-operative Diagnosis: 1) Intrauterine pregnancy at 39w6d 2) Category 2 tracing remote from delivery    Post-operative Diagnosis: Viable male infant, delivered   Procedure: Primary low transverse  section via Pfannenstiel skin incision with two layer uterine closure    Anesthesia: Epidural  FRA Score: 2    EBL: 988 mL   IV fluids: 1450 mL crystalloids  Urine Output: 75 ml     Complications: None  Specimen: None  Drains: Graves catheter    Indication: Ms. Saundra Urrutia 23 year old  presented at 39w5d for elective induction of labor. She had underwent cervidil induction on the evening on . Following removal of her cervidil the following morning of , she was deemed favorable and was transitioned to Pitocin. She had progressed to about 5 cm dilation however fetal heart tracing began to demonstrate Category 2 findings. There were recurrent late decelerations seen with occasional variable decelerations as well. Between these findings the variability remained moderate. However, given remote delivery with ongoing Category 2 findings of the fetal heart tracing in addition to known thick meconium seen following spontaneous rupture of membranes, options were reviewed with patient for expectant management vs proceeding with primary  section. Following reviewed of respective risks, patient elected to proceed with primary  section.   Risk reviewed included but were not limited to bleeding, need for blood transfusion, infection, injury to surrounding organs (ie bowel/intestines, bladder, ureters, major blood vessels and nerves). If any of these organs are injured, then we identify it and try to fix it. If we cant fix it ourselves, then we consult surgeons that  specialize in those areas that are damaged and they fix it for us. Unintended injuries can go unnoticed at the time of surgery as well, and present with complications days to weeks later. Also discussed were fetal injury and possible  hysterectomy for life threatening blood loss.   Patient conveyed understanding of risks and agreed to proceed with the surgery. She signed the consent form.     Findings: Viable male infant delivered at 2325 on 2019 in cephalic presentation with nuchal cord times 1. Nuchal cord was reduced. Kiwi vacuum applied to fetal scalp to facilitate delivery of infant from hysterotomy. Thin meconium stained amniotic fluid noted. Weight 6 lbs 13 oz. Apgar  8 and 9 at 1 and 5 minutes, respectively. Normal appearing uterus, bilateral fallopian tubes and ovaries.     Technique: To follow with full operative note    Eleanor Gramajo MD  [facility]

## 2019-08-21 NOTE — PLAN OF CARE
Data: Vital signs within normal limits. Postpartum checks within normal limits - see flow record. Patient eating and drinking normally. Patient has an indwelling catheter. . Patient has not yet ambulated..   No apparent signs of infection. Incision healing well. Patient Is not performing self cares and Is not able to care for infant. Positive attachment behaviors are observed with infant. Support persons are present.  Action:  Pain plan was discussed. Patient would like pain meds to be brought in when they are due. Patient was medicated during the shift for pain. See MAR.Patient education done about breastfeeding. See flow record.  Response:   Patient reassessed within 1 hour after each medication for pain. Patient stated that pain had improved. Patient stated that she was comfortable. .   Plan: Anticipate discharge on 8/23.

## 2019-08-21 NOTE — ANESTHESIA PREPROCEDURE EVALUATION
"Anesthesia Pre-Procedure Evaluation    Patient: Saundra Urrutia   MRN: [id number] : 1995          Preoperative Diagnosis: fetal intolerance    Procedure(s):   SECTION    Past Medical History:   Diagnosis Date     Anxiety      Depression      Depressive disorder     taking meds     Past Surgical History:   Procedure Laterality Date     NO HISTORY OF SURGERY         Anesthesia Evaluation     .             ROS/MED HX    ENT/Pulmonary:     (+)tobacco use, Past use , . .    Neurologic:       Cardiovascular:         METS/Exercise Tolerance:     Hematologic:         Musculoskeletal:         GI/Hepatic:         Renal/Genitourinary:         Endo:     (+) Obesity, .      Psychiatric:     (+) psychiatric history anxiety and depression      Infectious Disease:         Malignancy:         Other:                          Physical Exam  Normal systems: cardiovascular, pulmonary and dental    Airway   Mallampati: II  TM distance: >3 FB  Neck ROM: full    Dental     Cardiovascular       Pulmonary             Lab Results   Component Value Date    WBC 11.6 (H) 2019    HGB 12.3 2019    HCT 37.9 2019     2019    CR 0.59 08/15/2019    ALT 27 08/15/2019    AST 12 08/15/2019    TSH 0.96 2013    HCG Negative 2012       Preop Vitals  BP Readings from Last 3 Encounters:   19 136/81   08/15/19 134/71   19 131/84    Pulse Readings from Last 3 Encounters:   19 74   08/15/19 (P) 94   19 123      Resp Readings from Last 3 Encounters:   19 18   08/15/19 16   19 18    SpO2 Readings from Last 3 Encounters:   19 97%   19 99%   19 97%      Temp Readings from Last 1 Encounters:   19 37  C (98.6  F) (Oral)    Ht Readings from Last 1 Encounters:   19 1.575 m (5' 2\")      Wt Readings from Last 1 Encounters:   19 107.5 kg (237 lb)    Estimated body mass index is 43.35 kg/m  as calculated from the following:    Height as of this " "encounter: 1.575 m (5' 2\").    Weight as of this encounter: 107.5 kg (237 lb).       Anesthesia Plan      History & Physical Review  History and physical reviewed and following examination; no interval change.    ASA Status:  2 emergent.    NPO Status:  > 6 hours    Plan for Epidural Maintenance will be Balanced.           Postoperative Care      Consents  Anesthetic plan, risks, benefits and alternatives discussed with:  Patient, Spouse and Parent (Mother and/or Father)..                 Griselda Hawk CRNA, APRN CRNA  "

## 2019-08-21 NOTE — ANESTHESIA POSTPROCEDURE EVALUATION
Patient: Saundra R Creighton    Procedure(s):   SECTION    Diagnosis:fetal intolerance  Diagnosis Additional Information: No value filed.    Anesthesia Type:  Epidural    Note:  Anesthesia Post Evaluation    Patient location during evaluation: Bedside  Patient participation: Able to participate in evaluation but full recovery from regional anesthesia has not yet occurred and is not anticipated to occur within 48 hours  Level of consciousness: awake and alert  Pain management: adequate  Airway patency: patent  Cardiovascular status: acceptable  Respiratory status: acceptable  Hydration status: acceptable  PONV: none     Anesthetic complications: None          Last vitals:  Vitals:    19 2100 19 2130 19 2230   BP: 109/59 115/59 136/81   Pulse:   74   Resp:  16 18   Temp:  37.1  C (98.7  F) 37  C (98.6  F)   SpO2: 98% 97% 97%         Electronically Signed By: Griselda Hawk CRNA, APRN CRNA  2019  12:32 AM

## 2019-08-21 NOTE — ANESTHESIA CARE TRANSFER NOTE
Patient: Saundra Urrutia    Procedure(s):   SECTION    Diagnosis: fetal intolerance  Diagnosis Additional Information: No value filed.    Anesthesia Type:   Epidural     Note:    Patient transferred to:Phase II  Handoff Report: Identifed the Patient, Identified the Reponsible Provider, Reviewed the pertinent medical history, Discussed the surgical course, Reviewed Intra-OP anesthesia mangement and issues during anesthesia, Set expectations for post-procedure period and Allowed opportunity for questions and acknowledgement of understanding      Vitals: (Last set prior to Anesthesia Care Transfer)    CRNA VITALS  2019 2354 - 2019 0027      2019             SpO2:  100 %    EKG:  NSR                Electronically Signed By: Griselda Hawk CRNA, APRN CRNA  2019  12:27 AM

## 2019-08-21 NOTE — PLAN OF CARE
Dr. SERINA Gramajo has been here to see patient and  has determined that Saundra Urrutia should be readied for unscheduled  section. Plan of care reviewed with patient and support person. Questions answered and concerns addressed by MD. Patient agrees with plan of care. Consent signed. Anesthesia notified. IV  fluids infusing well. Reilly cloth applied to abdomen. SCD's applied bilaterally. Bicitra given. IV antibiotic initiated. Patient has been NPO since . Griselda Hawk CRNA has been notified. Ready for surgery. To OR per bed.

## 2019-08-21 NOTE — PROGRESS NOTES
PPD#1  Doing well. Pain well controlled on oral and IV pain medication. Tolerating liquid diet. Graves catheter in place. Has yet to ambulate out of bed. Denies flatus.  Lochia is scant. Breast feeding.     Vitals:    08/21/19 0500 08/21/19 0515 08/21/19 0530 08/21/19 0600   BP: (!) 142/92   104/43   Cuff Size:       Pulse: 60   67   Resp: 18   16   Temp: 98  F (36.7  C)      TempSrc: Oral      SpO2: 98% 97% 97% 97%   Weight:       Height:         General Appearance: NAD  Abdomen: Soft, NT, ND. Fundus firm at U-2  Incision: Bandaged, appears dry.   Extremities: NT, trace edema    Hemoglobin   Date Value Ref Range Status   08/19/2019 12.3 11.7 - 15.7 g/dL Final   08/15/2019 12.7 11.7 - 15.7 g/dL Final   ]    A/P: 23 year old  PPD#1 s/p PLTCS for category 2 tracing, remote from delivery . Hemodynamically stable.   -- routine post-op cares  -- Graves catheter removal later this evening  -- encouraged to ambulate  -- anticipate discharge home on PPD#3 when meeting discharge goals    Eleanor Gramajo MD  [facility]

## 2019-08-21 NOTE — PLAN OF CARE
Fetal heart tones assessed and Category 2 tracing, noted, variable decelerations and prolonged deceleration noted. Uterine activity assessed and normal uterine activity noted. Interventions included IV fluid flush. Fetal heart tones moderate variability and accelerations present. Will continue to monitor closely and recheck cervix. Giving IV fluid bolus now.

## 2019-08-21 NOTE — L&D DELIVERY NOTE
"Delivery Summary    23 year old  presented at 39w5d for elective induction of labor  Cervix on admission was 1/70/-3 at 2000,   Cervidil induction   Following removal of cervidil the following morning cervix was 2/80/-3 at 0847,   Transitioned to Pitocin induction with max dose to 10 mU/min  SROM occurred at 1006,  with thick meconium fluid noted.   S/p epidural   Category 2 fetal heart tracing noted with recurrent late decelerations and occasional variable decelerations.   Given her cervix being at 5 cm suggest remoteness from delivery, primary  section was recommended.   Risks of  section reviewed with patient. Patient conveyed understanding of risks and agreed to proceed.     Underwent an uncomplicated PLTCS  Viable male infant, delivered in cephalic presentation at 2325,   Nuchal cord x 1 which was reduced.   Kiwi vacuum applied to fetal scalp to facilitate delivery of infant from hysterotomy   Amniotic fluid still noted to be meconium stained.  Weight 6 lbs 13 oz  Apgar 8 and 9 at 1 and 5 minutes, respectively.   Operative findings were otherwise unremarkable; normal appearing uterus, bilateral fallopian tubes and ovaries. No intraperitoneal adhesions noted.    mL  \"[place]\"    Eleanor Gramajo MD  [facility]          "

## 2019-08-22 LAB — HGB BLD-MCNC: 10.5 G/DL (ref 11.7–15.7)

## 2019-08-22 PROCEDURE — 25000131 ZZH RX MED GY IP 250 OP 636 PS 637: Performed by: NURSE ANESTHETIST, CERTIFIED REGISTERED

## 2019-08-22 PROCEDURE — 85018 HEMOGLOBIN: CPT | Performed by: OBSTETRICS & GYNECOLOGY

## 2019-08-22 PROCEDURE — 12000000 ZZH R&B MED SURG/OB

## 2019-08-22 PROCEDURE — 36415 COLL VENOUS BLD VENIPUNCTURE: CPT | Performed by: OBSTETRICS & GYNECOLOGY

## 2019-08-22 PROCEDURE — 25000132 ZZH RX MED GY IP 250 OP 250 PS 637: Performed by: OBSTETRICS & GYNECOLOGY

## 2019-08-22 RX ADMIN — OXYCODONE HYDROCHLORIDE 5 MG: 5 TABLET ORAL at 22:27

## 2019-08-22 RX ADMIN — OXYCODONE HYDROCHLORIDE 5 MG: 5 TABLET ORAL at 09:50

## 2019-08-22 RX ADMIN — IBUPROFEN 800 MG: 800 TABLET ORAL at 15:52

## 2019-08-22 RX ADMIN — OXYCODONE HYDROCHLORIDE 5 MG: 5 TABLET ORAL at 02:43

## 2019-08-22 RX ADMIN — IBUPROFEN 800 MG: 800 TABLET ORAL at 22:30

## 2019-08-22 RX ADMIN — SENNOSIDES AND DOCUSATE SODIUM 1 TABLET: 8.6; 5 TABLET ORAL at 22:30

## 2019-08-22 RX ADMIN — ACETAMINOPHEN 975 MG: 325 TABLET, FILM COATED ORAL at 03:47

## 2019-08-22 RX ADMIN — ONDANSETRON 4 MG: 4 TABLET, ORALLY DISINTEGRATING ORAL at 09:07

## 2019-08-22 RX ADMIN — IBUPROFEN 800 MG: 800 TABLET ORAL at 02:43

## 2019-08-22 RX ADMIN — OXYCODONE HYDROCHLORIDE 5 MG: 5 TABLET ORAL at 16:01

## 2019-08-22 RX ADMIN — ACETAMINOPHEN 975 MG: 325 TABLET, FILM COATED ORAL at 20:12

## 2019-08-22 RX ADMIN — IBUPROFEN 800 MG: 800 TABLET ORAL at 09:51

## 2019-08-22 RX ADMIN — ACETAMINOPHEN 975 MG: 325 TABLET, FILM COATED ORAL at 12:09

## 2019-08-22 RX ADMIN — SENNOSIDES AND DOCUSATE SODIUM 2 TABLET: 8.6; 5 TABLET ORAL at 09:55

## 2019-08-22 NOTE — PROGRESS NOTES
PPD#2    Doing well. Pain well controlled on oral pain medication. Tolerating regular diet. Voiding without difficulty. Ambulating without difficulty. Reports flatus. Lochia is scant. Breast feeding.     Vitals:    08/21/19 1332 08/21/19 1639 08/21/19 1930 08/21/19 2100   BP:  122/69 (!) 139/94 135/74   Cuff Size:       Pulse:  69 108    Resp:  18 20    Temp:  97.9  F (36.6  C) 98  F (36.7  C)    TempSrc:  Oral Oral    SpO2: 95%      Weight:       Height:         Urine Output: 1250 mL in the last 24 hours    General Appearance: NAD  Abdomen: Soft, NT, ND. Fundus firm at U-2  Incision: Bandaged, appears dry.   Extremities: NT, trace edema    Hemoglobin   Date Value Ref Range Status   08/22/2019 10.5 (L) 11.7 - 15.7 g/dL Final   08/19/2019 12.3 11.7 - 15.7 g/dL Final   ]    A/P: 23 year old  PPD#2 s/p PLTCS for category 2 tracing, remote from delivery . Hemodynamically stable.   -- routine post-op cares   -- encouraged to ambulate  -- anticipate discharge home on PPD#3 when meeting discharge goals    Eleanor Gramajo MD  [facility]

## 2019-08-22 NOTE — PLAN OF CARE
Problem: Adult Inpatient Plan of Care  Goal: Patient-Specific Goal (Individualization)  2019 1052 by Kathia Sotelo RN  Outcome: Improving     Problem: Adult Inpatient Plan of Care  Goal: Optimal Comfort and Wellbeing  2019 1052 by Kathia Sotelo RN  Outcome: Improving     Problem: Adult Inpatient Plan of Care  Goal: Readiness for Transition of Care  2019 1052 by Kathia Sotelo RN  Outcome: Improving     Problem: Fall Injury Risk  Goal: Absence of Fall and Fall-Related Injury  2019 1052 by Kathia Sotelo RN  Outcome: Improving     Problem: Adjustment to Role Transition (Postpartum  Delivery)  Goal: Successful Maternal Role Transition  2019 1052 by Kathia Sotelo RN  Outcome: Improving  Note:   Pt is participating more independently with  cares today.  Attentive to his needs.       Problem: Infection (Postpartum  Delivery)  Goal: Absence of Infection Signs/Symptoms  2019 1052 by Kathia Sotelo RN  Outcome: Improving     Problem: Pain (Postpartum  Delivery)  Goal: Acceptable Pain Control  2019 1052 by Kathia Sotelo RN  Outcome: Improving  Note:   Pt is needing oxycodone in addition to Tylenol and Ibuprofen for pain.  Incisional pain.       Problem: Postoperative Nausea and Vomiting (Postpartum  Delivery)  Goal: Nausea and Vomiting Relief  2019 1052 by Kathia Sotelo RN  Outcome: Improving  Note:   Pt had an episode of nausea after breakfast.  Gave Zofran ODT with relief.       Problem: Postoperative Urinary Retention (Postpartum  Delivery)  Goal: Effective Urinary Elimination  2019 1052 by Kathia Sotelo RN  Outcome: Improving  Note:   Voiding on her own without difficulty.

## 2019-08-22 NOTE — PROGRESS NOTES
Pt was assisted up to the bathroom for the first time.  Sat on the toilet and was washed up.  Was tearful about the discomfort that she was feeling due to the duncan catheter.  Discontinued it.  Ambulated to the chair with SBA.  Pt tolerated it but then started to cry.  She requested an abdominal binder.  Got the biggest one and it almost did not fit.  What was velcrowed made her feel better.  Gave reassurance to both her and the alledged FOB.  Both were emotionally having a hard time.  Report given to Toyin ESCAMILLA RN.  Needing assistance with breast feeding latch.

## 2019-08-22 NOTE — PLAN OF CARE
Patient teary as  becomes angry and frustrated over difficulty with dressing infant. He complained of being sleep deprived and unable to put on a tshirt. Provided verbal cues and physical demonstration. Encouraged efforts of . Encouraged both to rest as able.

## 2019-08-22 NOTE — PROGRESS NOTES
Pt is having nipple soreness.  Nipple shield was introduced last PM.  Pt used on left breast this last feeding.  Nipple is slightly reddened with a possible crack.  Currently electric pump is being utilized per pt request and because of shield use.  If  continues to be fussy will finger feed EBM.

## 2019-08-22 NOTE — PROGRESS NOTES
"Late entry    Alleged FOB was unhappy with the writer of this note due to a comment that was made in regards to his vaping.  This RN said he was in denial of the fact that it did not affect his health.  He did not like this and proceeded to leave the room.  This upset the pt and she began to cry.  This RN apologized to her.  She stated that she just wanted everyone to get along and not argue.  When asked if she had any problems with this writer being her nurse?  She stated \"I don't know...maybe..is Elizabeth here?\"  After further discussion, reassurance was given that arrangements could be made to get her a different RN.  Management was notified and was going to talk to them.    "

## 2019-08-22 NOTE — PLAN OF CARE
Data: Vital signs within normal limits. Postpartum checks within normal limits - see flow record. Patient eating and drinking normally. Patient able to empty bladder independently. . Patient ambulating independently..   No apparent signs of infection. Incision healing well. Patient is performing self cares and is able to care for infant. Positive attachment behaviors are observed with infant. Support persons are present.  Action:  Pain plan was discussed. Patient would like pain meds to be brought in when they are due. Patient was medicated during the shift for pain. See MAR.Patient education done about breastfeeding,  safety and rest. See flow record.  Response:   Patient reassessed within 1 hour after each medication for pain. Patient stated that pain had improved. Patient stated that she was comfortable. .   Plan: Anticipate discharge on .

## 2019-08-23 VITALS
HEIGHT: 62 IN | OXYGEN SATURATION: 95 % | SYSTOLIC BLOOD PRESSURE: 136 MMHG | BODY MASS INDEX: 43.61 KG/M2 | RESPIRATION RATE: 18 BRPM | WEIGHT: 237 LBS | TEMPERATURE: 98.2 F | DIASTOLIC BLOOD PRESSURE: 65 MMHG | HEART RATE: 79 BPM

## 2019-08-23 PROCEDURE — 40000275 ZZH STATISTIC RCP TIME EA 10 MIN

## 2019-08-23 PROCEDURE — 25000132 ZZH RX MED GY IP 250 OP 250 PS 637: Performed by: OBSTETRICS & GYNECOLOGY

## 2019-08-23 RX ORDER — IBUPROFEN 600 MG/1
600 TABLET, FILM COATED ORAL EVERY 6 HOURS PRN
Qty: 30 TABLET | Refills: 0 | Status: SHIPPED | OUTPATIENT
Start: 2019-08-23 | End: 2019-09-06

## 2019-08-23 RX ORDER — SENNA AND DOCUSATE SODIUM 50; 8.6 MG/1; MG/1
1 TABLET, FILM COATED ORAL 2 TIMES DAILY
Qty: 60 TABLET | Refills: 0 | Status: SHIPPED | OUTPATIENT
Start: 2019-08-23 | End: 2019-09-06

## 2019-08-23 RX ORDER — OXYCODONE HYDROCHLORIDE 5 MG/1
5-10 TABLET ORAL EVERY 6 HOURS PRN
Qty: 20 TABLET | Refills: 0 | Status: SHIPPED | OUTPATIENT
Start: 2019-08-23 | End: 2019-09-06

## 2019-08-23 RX ORDER — ACETAMINOPHEN 500 MG
500-1000 TABLET ORAL EVERY 6 HOURS PRN
Qty: 30 TABLET | Refills: 0 | Status: SHIPPED | OUTPATIENT
Start: 2019-08-23 | End: 2019-09-06

## 2019-08-23 RX ADMIN — ACETAMINOPHEN 975 MG: 325 TABLET, FILM COATED ORAL at 04:11

## 2019-08-23 RX ADMIN — IBUPROFEN 800 MG: 800 TABLET ORAL at 04:11

## 2019-08-23 RX ADMIN — ACETAMINOPHEN 975 MG: 325 TABLET, FILM COATED ORAL at 11:57

## 2019-08-23 RX ADMIN — IBUPROFEN 800 MG: 800 TABLET ORAL at 11:57

## 2019-08-23 NOTE — DISCHARGE INSTRUCTIONS
Storing Expressed Milk    You can express your milk and store it in clean containers. Your family or a sitter can feed it to the baby. This way, your baby gets the benefits of your milk even when you can't be there at feeding time.  Type of storage Storage times   Room temperature       At room temperature (up to 78 F or 26 C)    Tip: Keep the container clean, covered, and cool. 3 to 4 hours is best; 6 to 8 hours is acceptable under very clean conditions   Refrigerator       In a refrigerator (less than 39 F or less than 4 C)    Tip: Place milk in the back of the main section of the refrigerator. 72 hours is best; up to 8 days is acceptable under very clean conditions   Freezer        In a freezer (0 F or -17 C)    Tip: Store milk toward the back of the freezer. 6 months is best; 12 months is acceptable   Guidelines for milk storage  Always use a clean container to collect and store milk. Never pour warm expressed milk into a bottle with cold milk. And be sure to label and date each bottle or bag of milk. To store milk safely, see the chart above.  Warming stored milk  Thaw frozen milk in the refrigerator or in a bowl of warm water. It s a good idea to warm refrigerated milk before using it. For your baby s safety:    Use the oldest milk first.    Warm a container of milk by putting it in a bowl of warm (not hot) water for a few minutes. Or use a bottle warmer set on low.    Gently swirl the milk to mix it. Then place a few drops on your wrist. The milk should be near room temperature.    Don t put the milk in a microwave. This could create pockets of hot liquid that can burn your baby s mouth.  Date Last Reviewed: 3/1/2017    8988-9826 [employer]. [address] [place] [address], [place] [address]. All rights reserved. This information is not intended as a substitute for professional medical care. Always follow your healthcare professional's instructions.        Common Questions About Breastfeeding    Here  are answers to some questions new mothers often ask.  Is my baby getting enough milk?  When it comes to feeding your baby, what goes in must come out. You can tell how much milk your baby is getting by keeping track of the baby s diapers:    By the first 24 hours after birth: The baby should have 1 to 2 wet diapers and 1 to 2 soiled (poopy) diapers. The poop will be dark and tar-like (meconium).    The second and third day after birth: The baby should have 3 to 4 wet diapers and 2 to 3 soiled diapers. The poop will be greenish brown (transitional stool).    After the first 4 or 5 days: The baby should have at least 5 to 6 wet diapers and at least 3 to 4 soiled diapers a day. The poop will be yellow and loose.  How can I tell when my baby s hungry?  Don t wait until your baby cries to feed him or her. Newborns should be nursed as soon as they show any hunger signs. These include:    Increased alertness or activity    Rooting reflex (nuzzling against your breast)    Smacking lips or opening and closing the mouth    Sucking on the hand or fingers    Crying (late sign)  How often should I feed my baby?  Feed your baby as often and as long as he or she wants. Make sure you re nursing at least 8 to 12 times per day. Some of these feedings might be close together (cluster feeding), and then your baby might rest for several hours. Let your baby nurse as long as he or she would like; when done, he or she will stop swallowing, relax his or her hands and fall asleep. If your baby hasn't nursed in 4 hours, you may need to wake your baby and offer your milk. Newborns tend to be very sleepy and sometimes will not wake to eat. If your baby doesn't seem interested in nursing, place him or her in just diapers against your bare skin (skin to skin) and continue to offer your milk. And, if your baby fusses when feeding, don't worry. Some babies get distracted easily. To calm your baby, choose a quiet place for feeding. It may also help  "if you breastfeed in the same place in your home each time. If your baby is crying, it may be difficult for him or her to latch on. Gently place your finger in the mouth to help him or her feel calm, and then offer your milk again.  Will I spoil my baby?  Newborns can't be spoiled. When your baby needs comfort, food, or holding, his or her crying will let you know. When you respond to your baby's needs, you help him or her learn to trust you. This is a time to shower your baby with love and attend to his or her needs.  Why is my baby so hungry?  Babies eat a lot. Their stomachs are very small when they are born, and mother's milk is easily and quickly digested. This is even truer during a growth spurt. Growth spurts usually happen around 2 and 6 weeks of age. They happen again at 3 and 6 months. During these times, your baby will breastfeed more often. Don t be alarmed. Your baby will not need formula or supplements. You will make all the milk that your baby needs because milk production is a \"supply and demand\" situation (baby's demand will increase mom's supply).  Date Last Reviewed: 2018-2018 [employer]. [address] [place] [address], [place] [address]. All rights reserved. This information is not intended as a substitute for professional medical care. Always follow your healthcare professional's instructions.        Breastfeeding FAQs  How often should I nurse?  Feed your  whenever he or she show signs of hunger. A general guideline is to nurse around 8 to 12 times every 24 hours, or about every 2 to 3 hours. With time and patience, every mother-baby pair will develop their own schedule and feeding pattern.  How many months should I nurse?  The American College of Obstetricians and Gynecologists and the American Academy of Pediatrics both recommend that mothers start breastfeeding as soon as possible after birth. Both support  breastfeeding-only  for the first 6 months of life. At 6 " months, your baby may slowly start having solid foods as well. But continue breastfeeding at least through the baby s first birthday. After the first birthday, you and your baby can stop or continue breastfeeding as long as you want it to happen.  Is my baby getting enough milk?  There are a few ways to check if your baby is getting enough milk.  Latching on  You know your baby is getting milk if you hear gulping and swallowing sounds, not just sucking. Look for your baby steadily moving his or her jaw open and closed as another sign of proper  latching on.   Urine output and stool frequency  You can also tell how much milk your baby is getting by keeping track of your baby s diapers. By the end of the first week of life:    Your baby should have about 1 wet diaper on day 1, and  2 wet diapers on day 2. This should increase each day by 1 more wet diaper, up to 6 wet diapers on day 6 and then about 6 wet diapers every day. The urine will be a pale yellow color, not dark yellow or orange. Wet diapers should stay around this amount as your  baby gets older.    Your baby should have 3 or 4 stools per day. It is not uncommon for a  baby to pass stool after each feeding. In the second to fourth week of life, the number of stools can increase to about 5 per day. After 1 month, the number of stools usually lessens. It might be 1 or 2 a day. Some babies may have a day or days with no stool. In these cases, stool should be in larger amounts when it is passed.     Weight  It s normal for your baby to lose some weight during the first 3 to 4 days of life. A baby might lose up to 7% of his or her birth weight during this time. Then your baby should start gaining again. By the end of the second week, he or she will back to birth weight.  Does my baby need vitamins?  All  infants should get vitamin D supplements. Your baby s healthcare provider will prescribe them. This may be at least 400 international  units (IU) a day. Your baby usually will not need any other supplements. When your baby is 6 months old, you may start to offer baby foods that contain iron.  What should I do if my breasts become swollen, tender, or sore?  These symptoms are most often because your breasts are too full of milk (engorged). You are making more milk than your baby is drinking. This may cause pain and make it harder for your baby to nurse. If this happens, try the following:    Keep nursing. This is a temporary condition. It gets better once you can get your baby to drink more milk. Be sure to breastfeed more often and let your baby feed until he or she is finished.    Express some milk before you breastfeed. Do this manually or with a breast pump. This will soften the darker area around the nipple (areola) so your baby can latch deeper to begin feeding.    Use a warm compress. This can be a towel or paper diaper soaked in warm water. Or take a warm shower before feeding. Some women have found that switching off between a cold compress and a warm one gives them relief. If you feel comfortable with this, you can try it too. If you use an ice pack, wrap it in a thin towel to protect your skin.    Take acetaminophen for continued pain. The medicine is safe to take every now and then during breastfeeding.  If your nipples or breasts continue to hurt, call your healthcare provider. Nipple and breast problems need to be looked at and treated as soon as possible. But don t get discouraged and stop nursing.    When to seek medical advice  Unless your baby s healthcare provider advises otherwise, call the provider right away if your baby has any of the following:    Fever (see Fever and children, below)    Repeated vomiting    Does not appear to be alert, refuses to nurse, or is sleeping too much, such as through feedings    Has signs of dehydration. These include fewer wet diapers than normal or no urine for 8 hours, or the urine appears dark.  Or your baby has no tears when crying,  sunken eyes,  or dry mouth.    Is not gaining weight or losing weight  Call your own healthcare provider right away if you:    Have a fever of 100.4 F (38 C) or higher, or as directed by your provider    Have redness, warmth, pain, or unusual discharge from your breasts    Have such painful nipples and breasts that you want to stop nursing    Have a hard lump in your breast    Have lower belly (abdominal) pain or cramping when you are not breastfeeding    Have pain or burning when you pass urine    Have unexpected vaginal bleeding or foul-smelling discharge  Fever and children  Always use a digital thermometer to check your child s temperature. Never use a mercury thermometer.  For infants and toddlers, be sure to use a rectal thermometer correctly. A rectal thermometer may accidentally poke a hole in (perforate) the rectum. It may also pass on germs from the stool. Always follow the product maker s directions for proper use. If you don t feel comfortable taking a rectal temperature, use another method. When you talk to your child s healthcare provider, tell him or her which method you used to take your child s temperature.  Here are guidelines for fever temperature. Ear temperatures aren t accurate before 6 months of age. Don t take an oral temperature until your child is at least 4 years old.  Infant under 3 months old:    Ask your child s healthcare provider how you should take the temperature.    Rectal or forehead (temporal artery) temperature of 100.4 F (38 C) or higher, or as directed by the provider    Armpit temperature of 99 F (37.2 C) or higher, or as directed by the provider   Date Last Reviewed: 3/1/2017    9468-1676 [employer]. [address] [place] [address]. All rights reserved. This information is not intended as a substitute for professional medical care. Always follow your healthcare professional's instructions.      Postop   Birth Instructions    Activity       Do not lift more than 10 pounds for 6 weeks after surgery.  Ask family and friends for help when you need it.    No driving until you have stopped taking your pain medications (usually two weeks after surgery).    No heavy exercise or activity for 6 weeks.  Don't do anything that will put a strain on your surgery site.    Don't strain when using the toilet.  Your care team may prescribe a stool softener if you have problems with your bowel movements.     To care for your incision:       Keep the incision clean and dry.    Do not soak your incision in water. No swimming or hot tubs until it has fully healed. You may soak in the bathtub if the water level is below your incision.    Do not use peroxide, gel, cream, lotion, or ointment on your incision.    Adjust your clothes to avoid pressure on your surgery site (check the elastic in your underwear for example).     You may see a small amount of clear or pink drainage and this is normal.  Check with your health care provider:       If the drainage increases or has an odor.    If the incision reddens, you have swelling, or develop a rash.    If you have increased pain and the medicine we prescribed doesn't help.    If you have a fever above 100.4 F (38 C) with or without chills when placing thermometer under your tongue.   The area around your incision (surgery wound), will feel numb.  This is normal. The numbness should go away in less than a year.     Keep your hands clean:  Always wash your hands before touching your incision (surgery wound). This helps reduce your risk of infection. If your hands aren't dirty, you may use an alcohol hand-rub to clean your hands. Keep your nails clean and short.    Call your healthcare provider if you have any of these symptoms:       You soak a sanitary pad with blood within 1 hour, or you see blood clots larger than a golf ball.    Bleeding that lasts more than 6 weeks.    Vaginal discharge  that smells bad.    Severe pain, cramping or tenderness in your lower belly area.    A need to urinate more frequently (use the toilet more often), more urgently (use the toilet very quickly), or it burns when you urinate.    Nausea and vomiting.    Redness, swelling or pain around a vein in your leg.    Problems breastfeeding or a red or painful area on your breast.    Chest pain and cough or are gasping for air.    Problems with coping with sadness, anxiety or depression. If you have concerns about hurting yourself or the baby, call your provider immediately.      You have questions or concerns after you return home.

## 2019-08-23 NOTE — DISCHARGE SUMMARY
[facility] Discharge Summary    Saundra Urrutia MRN# [id number]   Age: 23 year old YOB: 1995     Date of Admission:  2019  Date of Discharge::  2019  Admitting Physician:  Eleanor Gramajo MD  Discharge Physician:  Eleanor Gramajo MD     Home clinic: [facility]          Admission Diagnoses:   Intrauterine pregnancy at 39w5d  GBS negative status  Elective induction of labor          Discharge Diagnosis:   Viable male infant, delivered  S/p PLTCS          Procedures:   Procedure(s): Primary low transverse  section        No other procedures performed during this admission           Medications Prior to Admission:     No medications prior to admission.             Discharge Medications:     Discharge Medication List as of 2019  9:29 AM      START taking these medications    Details   acetaminophen (TYLENOL) 500 MG tablet Take 1-2 tablets (500-1,000 mg) by mouth every 6 hours as needed for mild pain, Disp-30 tablet, R-0, E-Prescribe      ibuprofen (ADVIL/MOTRIN) 600 MG tablet Take 1 tablet (600 mg) by mouth every 6 hours as needed for moderate pain, Disp-30 tablet, R-0, E-Prescribe      oxyCODONE (ROXICODONE) 5 MG tablet Take 1-2 tablets (5-10 mg) by mouth every 6 hours as needed for pain, Disp-20 tablet, R-0, Local Print      SENNA-docusate sodium (SENNA S) 8.6-50 MG tablet Take 1 tablet by mouth 2 times daily, Disp-60 tablet, R-0, E-Prescribe         CONTINUE these medications which have NOT CHANGED    Details   order for DME Equipment being ordered: SplintDisp-1 Units, R-0, Local Print      Prenatal Vit-Fe Fumarate-FA (PRENATAL MULTIVITAMIN W/IRON) 27-0.8 MG tablet Take 1 tablet by mouth every evening , Historical      sertraline (ZOLOFT) 50 MG tablet Take 1 tablet (50 mg) by mouth daily, Disp-30 tablet, R-3, E-Prescribe                   Consultations:   No consultations were requested during this admission          Brief History of Labor  "or Admission:     Saundra Urrutia is a 23 year old female who is 39w5d pregnant and being admitted for induction of labor, indication maternal request.           Hospital Course:     23 year old  presented at 39w5d for elective induction of labor  Cervix on admission was 1/70/-3 at 2000,   Cervidil induction   Following removal of cervidil the following morning cervix was 2/80/-3 at 0847,   Transitioned to Pitocin induction with max dose to 10 mU/min  SROM occurred at 1006,  with thick meconium fluid noted.   S/p epidural   Category 2 fetal heart tracing noted with recurrent late decelerations and occasional variable decelerations.   Given her cervix being at 5 cm suggest remoteness from delivery, primary  section was recommended.   Risks of  section reviewed with patient. Patient conveyed understanding of risks and agreed to proceed.      Underwent an uncomplicated PLTCS  Viable male infant, delivered in cephalic presentation at 2325,   Nuchal cord x 1 which was reduced.   Kiwi vacuum applied to fetal scalp to facilitate delivery of infant from hysterotomy   Amniotic fluid still noted to be meconium stained.  Weight 6 lbs 13 oz  Apgar 8 and 9 at 1 and 5 minutes, respectively.   Operative findings were otherwise unremarkable; normal appearing uterus, bilateral fallopian tubes and ovaries. No intraperitoneal adhesions noted.    mL  \"[place]\"    The patient's hospital course was unremarkable.  She recovered as anticipated and experienced no post-operative complications.  On discharge, her pain was well controlled. Vaginal bleeding is similar to peak menstrual flow.  Voiding without difficulty.  Ambulating well and tolerating a normal diet.  No fever or significant wound drainage.  Breastfeeding well.  Infant is stable.  No bowel movement yet.  She was discharged on post-partum day #3.    Post-partum hemoglobin:   Hemoglobin   Date Value Ref Range Status   2019 10.5 (L) " 11.7 - 15.7 g/dL Final   08/19/2019 12.3 11.7 - 15.7 g/dL Final             Discharge Instructions and Follow-Up:   Discharge diet: Regular   Discharge activity: Your activity upon discharge: Activity as tolerated  No lifting or strenuous exercise for 6 weeks  No driving for 2 weeks or no driving while on narcotic pain medications  Nothing in the vagina including sex, douching or tampons for 6 weeks   Discharge follow-up: Follow up with primary care provider in 6 weeks   Wound care: Drink plenty of fluids  Ice to area for comfort  Keep wound clean and dry           Discharge Disposition:   Discharged to home      Attestation:  I have reviewed today's vital signs, notes, medications, labs and imaging.    Eleanor Gramajo MD   [facility]

## 2019-08-23 NOTE — PROGRESS NOTES
Pt left via ambulatory with her S.O. And baby after discharge/teaching instructions were reviewed.

## 2019-08-23 NOTE — PROGRESS NOTES
Care Transitions Note     drug detection panel on cord tissue segment is positive. Per [facility] policy, mandated child protection report was made to [facility] Child Protection [phone] Fax: [phone].  All appropriate documents were faxed and [facility] was notified via phone. No other CTS needs at this time. Alis SANTIAGO, [facility], [facility] [phone]

## 2019-08-23 NOTE — PROGRESS NOTES
PPD#3    Doing well. Pain well controlled on oral pain medication. Tolerating regular diet. Voiding without difficulty. Ambulating without difficulty. Reports flatus. Lochia is scant. Breast feeding. Desires to go home today.     Vitals:    08/22/19 0929 08/22/19 1730 08/23/19 0000 08/23/19 0733   BP: 125/79 (!) 142/72 (!) 142/69 136/65   Pulse: 95 82 88 79   Resp: 18 18 18 18   Temp: 98  F (36.7  C) 98.8  F (37.1  C) 98.3  F (36.8  C) 98.2  F (36.8  C)   TempSrc: Oral Oral Axillary Oral   SpO2:       Weight:       Height:         General Appearance: NAD  Abdomen: Soft, NT, ND. Fundus firm at U-2  Incision: Clean, dry and intact.   Extremities: NT, trace edema    Hemoglobin   Date Value Ref Range Status   08/22/2019 10.5 (L) 11.7 - 15.7 g/dL Final   08/19/2019 12.3 11.7 - 15.7 g/dL Final   ]    A/P: 23 year old  PPD#3 s/p PLTCS for category 2 tracing, remote from delivery . Hemodynamically stable.   -- outpatient precautions, expectations reviewed  -- stable for discharge home today    Eleanor Gramajo MD  [facility]

## 2019-08-23 NOTE — PLAN OF CARE
Data: Vital signs within normal limits. Postpartum checks within normal limits - see flow record. Patient eating and drinking normally. Patient able to empty bladder independently. . Patient ambulating independently..   No apparent signs of infection. Incision healing well. Patient Is performing self cares and Is able to care for infant. Positive attachment behaviors are observed with infant. Support persons are present.  Action:  Pain plan was discussed. Patient will request pain med when she is ready for it. Patient was medicated during the shift for pain. See MAR.Patient education done about breastfeeding,  cares, postpartum cares, pain management/plan and discharge from hospital. See flow record.  Response:   Patient reassessed within 1 hour after each medication for pain. Patient stated that pain had improved. Patient stated that she was comfortable. .   Plan: Anticipate discharge on .

## 2019-08-24 NOTE — OP NOTE
[facility]  Full Operative Note    Patient Name:Saundra Urrutia  MRN: [id number]  YOB: 1995  Surgery Date: 2019    Surgeon: Eleanor Gramajo MD  Assistant: Akilah Hayward CNP APRN       Pre-operative Diagnosis: 1) Intrauterine pregnancy at 39w6d 2) Category 2 tracing remote from delivery    Post-operative Diagnosis: Viable male infant, delivered   Procedure: Primary low transverse  section via Pfannenstiel skin incision with two layer uterine closure    Anesthesia: Epidural  FRA Score: 2    EBL: 988 mL   IV fluids: 1450 mL crystalloids  Urine Output: 75 ml     Complications: None  Specimen: None  Drains: Graves catheter    Indication: Ms. Saundra Urrutia 23 year old  presented at 39w5d for elective induction of labor. She had underwent cervidil induction on the evening on . Following removal of her cervidil the following morning of , she was deemed favorable and was transitioned to Pitocin. She had progressed to about 5 cm dilation however fetal heart tracing began to demonstrate Category 2 findings. There were recurrent late decelerations seen with occasional variable decelerations as well. Between these findings the variability remained moderate. However, given remote delivery with ongoing Category 2 findings of the fetal heart tracing in addition to known thick meconium seen following spontaneous rupture of membranes, options were reviewed with patient for expectant management vs proceeding with primary  section. Following reviewed of respective risks, patient elected to proceed with primary  section.   Risk reviewed included but were not limited to bleeding, need for blood transfusion, infection, injury to surrounding organs (ie bowel/intestines, bladder, ureters, major blood vessels and nerves). If any of these organs are injured, then we identify it and try to fix it. If we cant fix it ourselves, then we consult surgeons  that specialize in those areas that are damaged and they fix it for us. Unintended injuries can go unnoticed at the time of surgery as well, and present with complications days to weeks later. Also discussed were fetal injury and possible  hysterectomy for life threatening blood loss.   Patient conveyed understanding of risks and agreed to proceed with the surgery. She signed the consent form.     Findings: Viable male infant delivered at 2325 on 2019 in cephalic presentation with nuchal cord times 1. Nuchal cord was reduced. Kiwi vacuum applied to fetal scalp to facilitate delivery of infant from hysterotomy. Thin meconium stained amniotic fluid noted. Weight 6 lbs 13 oz. Apgar  8 and 9 at 1 and 5 minutes, respectively. Normal appearing uterus, bilateral fallopian tubes and ovaries.     Technique: After signing the consent form, the patient was taken to the operating room where epidural anesthesia was re-bolused and found to be adequate. She was then positioned in the supine position with a leftward tilt. She was then prepped and draped in the usual sterile fashion.  A formal TIME-OUT was conducted with correct identification of the patient and procedure being performed. The site of incision was tested for adequate anesthesia before incision was made and verified with the anesthesia staff and members.     A Pfannenstiel skin incision was made with the scalpel and carried down to the underlying fascia with the scalpel. The fascia was incised at the midline and extended laterally with the Goetz scissors. Kocher clamps were applied to the superior and inferior aspect of the fascia, lifted and tented up so as to bluntly and sharply dissect the fascia from the underlying rectus muscles. The rectus muscles were then  at the midline in a blunt fashion. The peritoneum was identified and entered in a blunt fashion. The peritoneum was extended superiorly and inferiorly by stretching and  sharp dissection with careful attention to avoid injury to the bowel and bladder. Once adequate extension of the peritoneum was achieved to allow for eventual delivery of the baby, the Elgin 0 retractor was inserted into the peritoneum. A bladder flap was created using Metzenbaum scissors.       A lower uterine segment incision was made with the scalpel and extended laterally in a digital blunt fashion. The head was grasped, elevated and delivered along with the rest of the infant through the hysterotomy without difficulty. Kiwi vacuum was used to facilitate delivery of head out of the hysterotomy; Kiwi was placed in the usual fashion 3 cm anterior from the posterior fontanelle. The cord was clamped, cut and handed off to the waiting nursery team. Cord gases were collected and sent.       The placenta was delivered spontaneously using gentle traction of the cord. The uterus was exteriorized and cleared of all clots and debris. Gentle massage was employed to achieve firmness to the uterus along with the instillation of uterotonics in the way of IV Pitocin in normal saline and IM methergine. The hysterotomy incision was repaired with 0 Vicryl in a running locked fashion. An imbricating layer along the repaired hysterotomy was made using 0 Monocryl in a running fashion. The repaired hysterotomy incision was inspected for hemostasis and deemed adequate.        The posterior cul-de-sac was irrigated and suctioned, and the uterus returned to the abdomen. The repaired hysterotomy was inspected for adequate hemostasis. The Elgin 0 retraction was removed from the peritoneum. Sepra film was applied over the repaired hysterotomy incision and the uterine fundus.       The peritoneum was approximated with 2-0 Vicryl in a running fashion. The rectus muscles were inspected for adequate hemostasis and not re-approximated. The fascia layer was reapproximated using 0 Vicryl in a running fashion. The subcutaneous layer was  irrigated, inspected for adequate hemostasis and re-approximated using 3-0 plain gut in interrupted  fashion in one layer. The skin was closed with subcuticular fashion using 4-0 Vicryl.       Sponge, laps, needle counts were correct times 2. The patient tolerated the procedure well and was taken to the postpartum area in stable condition. IV ancef and azithromycin were given prior to skin incision.    Eleanor Gramajo MD  [facility]

## 2019-09-06 ENCOUNTER — OFFICE VISIT (OUTPATIENT)
Dept: FAMILY MEDICINE | Facility: CLINIC | Age: 24
End: 2019-09-06
Payer: COMMERCIAL

## 2019-09-06 VITALS
DIASTOLIC BLOOD PRESSURE: 69 MMHG | HEIGHT: 62 IN | BODY MASS INDEX: 38.66 KG/M2 | WEIGHT: 210.1 LBS | SYSTOLIC BLOOD PRESSURE: 115 MMHG | TEMPERATURE: 98 F | HEART RATE: 75 BPM | RESPIRATION RATE: 22 BRPM

## 2019-09-06 DIAGNOSIS — Z98.891 S/P CESAREAN SECTION: ICD-10-CM

## 2019-09-06 DIAGNOSIS — F33.1 MAJOR DEPRESSIVE DISORDER, RECURRENT EPISODE, MODERATE (H): Primary | ICD-10-CM

## 2019-09-06 PROBLEM — Z34.03 ENCOUNTER FOR SUPERVISION OF NORMAL FIRST PREGNANCY IN THIRD TRIMESTER: Status: RESOLVED | Noted: 2019-08-19 | Resolved: 2019-09-06

## 2019-09-06 PROCEDURE — 99213 OFFICE O/P EST LOW 20 MIN: CPT | Performed by: FAMILY MEDICINE

## 2019-09-06 ASSESSMENT — PATIENT HEALTH QUESTIONNAIRE - PHQ9
5. POOR APPETITE OR OVEREATING: SEVERAL DAYS
SUM OF ALL RESPONSES TO PHQ QUESTIONS 1-9: 3

## 2019-09-06 ASSESSMENT — ANXIETY QUESTIONNAIRES
6. BECOMING EASILY ANNOYED OR IRRITABLE: SEVERAL DAYS
1. FEELING NERVOUS, ANXIOUS, OR ON EDGE: NOT AT ALL
5. BEING SO RESTLESS THAT IT IS HARD TO SIT STILL: NOT AT ALL
2. NOT BEING ABLE TO STOP OR CONTROL WORRYING: SEVERAL DAYS
GAD7 TOTAL SCORE: 5
7. FEELING AFRAID AS IF SOMETHING AWFUL MIGHT HAPPEN: SEVERAL DAYS
3. WORRYING TOO MUCH ABOUT DIFFERENT THINGS: SEVERAL DAYS

## 2019-09-06 ASSESSMENT — MIFFLIN-ST. JEOR: SCORE: 1671.26

## 2019-09-06 NOTE — PROGRESS NOTES
"SUBJECTIVE:                                                    Saundra Urrutia is a 23 year old female who presents to clinic today for the following health issues:      Here for MDD f/u  Had a baby boy via c section two weeks ago   Has a supportive partner   Is feeling overall physically well  Is tired  Pumping and bottle feeding   Felt very emotional initially but is feeling a little better now     Was on zoloft 100 prior to pregnancy and decreased it to  50mg during the pregnancy  Waking up twice at night to feed the baby-   No si/hi   Wants to increase back to 100    Not exercising yet     Review of systems:  No f/c   No abd pain  Normal appetite and energy level     Problem list and histories reviewed & adjusted, as indicated.  Additional history: as documented     Patient Active Problem List   Diagnosis     HL (hearing loss)     Self-injurious behavior     Hyperhidrosis     Obesity     Bilateral carpal tunnel syndrome     Major depressive disorder, recurrent episode, moderate (H)     Pregnancy, supervision of first, third trimester     Encounter for supervision of normal first pregnancy in third trimester     S/P  section     Current Outpatient Medications   Medication     Prenatal Vit-Fe Fumarate-FA (PRENATAL MULTIVITAMIN W/IRON) 27-0.8 MG tablet     sertraline (ZOLOFT) 50 MG tablet     order for DME     No current facility-administered medications for this visit.          OBJECTIVE:                                                    /69 (BP Location: Right arm, Patient Position: Sitting, Cuff Size: Adult Regular)   Pulse 75   Temp 98  F (36.7  C) (Tympanic)   Resp 22   Ht 1.575 m (5' 2\")   Wt 95.3 kg (210 lb 1.6 oz)   LMP 2018   BMI 38.43 kg/m   Body mass index is 38.43 kg/m .   GENERAL - Pt is alert and oriented in no acute distress.  Affect is appropriate. Good eye contact.  HEET - Head is normocephalic, atraumatic.    PERRLA,EEMI. Conjunctiva are free of icterus or erythema.  "   TMs bilaterally normal.  Oropharynx free of masses and lesions, no tonsillar exudate or petechiae.    NECK - Neck is supple w/o LA or thyromegaly  RESPIRATORY - Clear to auscultation bilaterally.  No wheezing noted  CV - RRR, no murmurs, rubs, gallops.   ABD - +BS, soft, nontender, no rebound, no guarding. No palpable organomegaly.  EXTREM - No edema.         ASSESSMENT/PLAN:                                                        (F33.1) Major depressive disorder, recurrent episode, moderate (H)  (primary encounter diagnosis)  Comment: discussed situation - taper up to 100mg/day and check in with me in 6 weeks or sooner prn. Try to get consistent sleep and start daily walks when the surgeon says it is ok.  The patient indicates understanding of these issues and agrees with the plan.   Plan: sertraline (ZOLOFT) 50 MG tablet            (Z98.891) S/P  section  Comment:   Plan:       [facility]

## 2019-09-07 ASSESSMENT — ANXIETY QUESTIONNAIRES: GAD7 TOTAL SCORE: 5

## 2019-09-13 ENCOUNTER — PRENATAL OFFICE VISIT (OUTPATIENT)
Dept: OBGYN | Facility: CLINIC | Age: 24
End: 2019-09-13
Payer: COMMERCIAL

## 2019-09-13 VITALS
SYSTOLIC BLOOD PRESSURE: 121 MMHG | TEMPERATURE: 96 F | HEART RATE: 86 BPM | BODY MASS INDEX: 38.57 KG/M2 | DIASTOLIC BLOOD PRESSURE: 67 MMHG | HEIGHT: 62 IN | WEIGHT: 209.6 LBS

## 2019-09-13 DIAGNOSIS — B37.2 YEAST INFECTION OF THE SKIN: Primary | ICD-10-CM

## 2019-09-13 PROCEDURE — 99024 POSTOP FOLLOW-UP VISIT: CPT | Performed by: ADVANCED PRACTICE MIDWIFE

## 2019-09-13 RX ORDER — NYSTATIN 100000 [USP'U]/G
POWDER TOPICAL 3 TIMES DAILY PRN
Qty: 30 G | Refills: 1 | Status: SHIPPED | OUTPATIENT
Start: 2019-09-13 | End: 2020-10-03

## 2019-09-13 RX ORDER — NYSTATIN 100000 U/G
CREAM TOPICAL 2 TIMES DAILY
Qty: 30 G | Refills: 1 | Status: SHIPPED | OUTPATIENT
Start: 2019-09-13 | End: 2020-10-03

## 2019-09-13 ASSESSMENT — MIFFLIN-ST. JEOR: SCORE: 1668.99

## 2019-09-13 ASSESSMENT — PATIENT HEALTH QUESTIONNAIRE - PHQ9: SUM OF ALL RESPONSES TO PHQ QUESTIONS 1-9: 4

## 2019-09-13 NOTE — PROGRESS NOTES
S:  Had  3 weeks ago.  Feeling well and happy to be a new mom, but recently noted itching and redness in incision area. She does not feel pain.    O:  Incision well healed.    Redness noted in the tissue fold under the the panus.  Tissue is moist.    Body mass index is 38.34 kg/m .  A:  Post    Yeast infection of the skin   P:  Reviewed patient with Dr. Tian who recommended fluconazole and nystatin powder.    Saundra declines oral fluconazole.  Nystatin powder and cream prescribed per her request.  Discussed the importance of keeping the tissue clean and dry. Some patients have used towel or gauze to keep the area dry.   Return to clinic  As needed before or at 6 week Postpartum visit.      15 minutes was spent face to face with the patient today discussing her history, diagnosis, and follow-up plan as noted above. Over 50% of the visit was spent in counseling and coordination of care.    Total Visit Time: 15 minutes.

## 2019-09-28 ENCOUNTER — HOSPITAL ENCOUNTER (EMERGENCY)
Facility: CLINIC | Age: 24
Discharge: HOME | End: 2019-09-28
Attending: EMERGENCY MEDICINE | Admitting: EMERGENCY MEDICINE
Payer: COMMERCIAL

## 2019-09-28 VITALS
RESPIRATION RATE: 16 BRPM | WEIGHT: 215 LBS | BODY MASS INDEX: 39.56 KG/M2 | HEIGHT: 62 IN | SYSTOLIC BLOOD PRESSURE: 154 MMHG | TEMPERATURE: 97.9 F | OXYGEN SATURATION: 100 % | DIASTOLIC BLOOD PRESSURE: 100 MMHG

## 2019-09-28 DIAGNOSIS — K29.00 ACUTE GASTRITIS WITHOUT HEMORRHAGE, UNSPECIFIED GASTRITIS TYPE: ICD-10-CM

## 2019-09-28 DIAGNOSIS — R10.13 EPIGASTRIC PAIN: ICD-10-CM

## 2019-09-28 LAB
ALBUMIN UR-MCNC: NEGATIVE MG/DL
APPEARANCE UR: CLEAR
BACTERIA #/AREA URNS HPF: ABNORMAL /HPF
BILIRUB UR QL STRIP: NEGATIVE
COLOR UR AUTO: YELLOW
GLUCOSE UR STRIP-MCNC: NEGATIVE MG/DL
HGB UR QL STRIP: NEGATIVE
KETONES UR STRIP-MCNC: NEGATIVE MG/DL
LEUKOCYTE ESTERASE UR QL STRIP: ABNORMAL
MUCOUS THREADS #/AREA URNS LPF: PRESENT /LPF
NITRATE UR QL: NEGATIVE
PH UR STRIP: 5 PH (ref 5–7)
RBC #/AREA URNS AUTO: 3 /HPF (ref 0–2)
SOURCE: ABNORMAL
SP GR UR STRIP: 1.02 (ref 1–1.03)
SQUAMOUS #/AREA URNS AUTO: 1 /HPF (ref 0–1)
UROBILINOGEN UR STRIP-MCNC: 0 MG/DL (ref 0–2)
WBC #/AREA URNS AUTO: 16 /HPF (ref 0–5)

## 2019-09-28 PROCEDURE — 25000125 ZZHC RX 250: Performed by: EMERGENCY MEDICINE

## 2019-09-28 PROCEDURE — 81001 URINALYSIS AUTO W/SCOPE: CPT | Performed by: EMERGENCY MEDICINE

## 2019-09-28 PROCEDURE — 25000132 ZZH RX MED GY IP 250 OP 250 PS 637: Performed by: EMERGENCY MEDICINE

## 2019-09-28 PROCEDURE — 99284 EMERGENCY DEPT VISIT MOD MDM: CPT | Mod: Z6 | Performed by: EMERGENCY MEDICINE

## 2019-09-28 PROCEDURE — 99283 EMERGENCY DEPT VISIT LOW MDM: CPT | Performed by: EMERGENCY MEDICINE

## 2019-09-28 RX ORDER — LIDOCAINE HYDROCHLORIDE 20 MG/ML
5-10 SOLUTION OROPHARYNGEAL
Qty: 100 ML | Refills: 1 | Status: SHIPPED | OUTPATIENT
Start: 2019-09-28 | End: 2020-10-03

## 2019-09-28 RX ADMIN — LIDOCAINE HYDROCHLORIDE 30 ML: 20 SOLUTION ORAL; TOPICAL at 21:24

## 2019-09-28 RX ADMIN — RANITIDINE 150 MG: 150 TABLET ORAL at 21:45

## 2019-09-28 ASSESSMENT — ENCOUNTER SYMPTOMS
FEVER: 0
ABDOMINAL PAIN: 1
NAUSEA: 1
SHORTNESS OF BREATH: 0

## 2019-09-28 ASSESSMENT — MIFFLIN-ST. JEOR: SCORE: 1693.48

## 2019-09-28 NOTE — ED AVS SNAPSHOT
[facility] Emergency Department  [address] [address]  Phone:  [phone]  Fax:  [phone]                                    Saundra Urrutia   MRN: [id number]    Department:  [facility] Emergency Department   Date of Visit:  9/28/2019           After Visit Summary Signature Page    I have received my discharge instructions, and my questions have been answered. I have discussed any challenges I see with this plan with the nurse or doctor.    ..........................................................................................................................................  Patient/Patient Representative Signature      ..........................................................................................................................................  Patient Representative Print Name and Relationship to Patient    ..................................................               ................................................  Date                                   Time    ..........................................................................................................................................  Reviewed by Signature/Title    ...................................................              ..............................................  Date                                               Time          22EPIC Rev 08/18

## 2019-09-29 NOTE — ED NOTES
Onset of pain around 7 in epigastric area that started in back, no vomiting, had barbeque meatloaf for dinner and jalapeno sliders from [employer] for lunch- tried ibuprofen for pain without relief

## 2019-09-29 NOTE — ED PROVIDER NOTES
History     Chief Complaint   Patient presents with     Abdominal Pain     started at 1900 with back pain that has now moved into her abd,      Back Pain     Diarrhea     Nausea     HPI  Saundra Urrutia is a 23 year old female who is 6 weeks postpartum from  section, with history of depression, obesity, presenting to the emergency department with acute onset of epigastric abdominal pain, radiating towards the back, with associated nausea.  Patient had meatloaf which was barbecued, in addition to sliders from [employer] and shortly after dinner developed epigastric abdominal pain, sharp in nature, moderate to severe in character, radiating towards the back, unrelieved with ibuprofen.  There is been nausea, without any vomiting.  No fever.   is her only abdominal surgery.  Denies any urinary symptoms.  Slight looser stool after the onset of pain, however no diarrhea.  No cough, chest pain, or other symptoms.    Allergies:  Allergies   Allergen Reactions     Sulfa Drugs Other (See Comments)     Red eye from eye drops.  Has never had oral or IV form.       Problem List:    Patient Active Problem List    Diagnosis Date Noted     S/P  section 2019     Priority: Medium     Pregnancy, supervision of first, third trimester 2019     Priority: Medium     Major depressive disorder, recurrent episode, moderate (H) 2018     Priority: Medium     Bilateral carpal tunnel syndrome 2018     Priority: Medium     Obesity 2014     Priority: Medium     Self-injurious behavior 2013     Priority: Medium     Superficial cutting       Hyperhidrosis 2013     Priority: Medium     HL (hearing loss) 2008     Priority: Medium     Saw audiology , sent to ENT           Past Medical History:    Past Medical History:   Diagnosis Date     Anxiety      Depression      Depressive disorder        Past Surgical History:    Past Surgical History:   Procedure Laterality Date      " SECTION N/A 2019    Procedure:  SECTION;  Surgeon: Eleanor Gramajo MD;  Location: [facility] OR     NO HISTORY OF SURGERY         Family History:    Family History   Problem Relation Age of Onset     Diabetes Paternal Grandfather      Cancer Other      Depression Maternal Grandmother      Depression Mother      Substance Abuse Mother         recovered  A&D     Diabetes Father      Depression Father      Heart Disease Father      Substance Abuse Father         A&D     Breast Cancer Paternal Grandmother      C.A.D. No family hx of        Social History:  Marital Status:  Single [1]  Social History     Tobacco Use     Smoking status: Former Smoker     Packs/day: 0.01     Types: Cigarettes     Smokeless tobacco: Never Used     Tobacco comment: uses E-cig   Substance Use Topics     Alcohol use: Not Currently     Comment: rare-quit with pregnancy     Drug use: Yes     Types: Marijuana     Comment: last use 12/10/18        Medications:    lidocaine (XYLOCAINE) 2 % solution  nystatin (MYCOSTATIN) 954973 UNIT/GM external cream  nystatin (MYCOSTATIN) 707717 UNIT/GM external powder  order for DME  Prenatal Vit-Fe Fumarate-FA (PRENATAL MULTIVITAMIN W/IRON) 27-0.8 MG tablet  sertraline (ZOLOFT) 50 MG tablet          Review of Systems   Constitutional: Negative for fever.   Respiratory: Negative for shortness of breath.    Cardiovascular: Negative for chest pain.   Gastrointestinal: Positive for abdominal pain and nausea.   All other systems reviewed and are negative.      Physical Exam   BP: (!) 154/100  Heart Rate: 72  Temp: 97.9  F (36.6  C)  Resp: 16  Height: 157.5 cm (5' 2\")  Weight: 97.5 kg (215 lb)  SpO2: 100 %      Physical Exam  BP (!) 154/100   Temp 97.9  F (36.6  C) (Oral)   Resp 16   Ht 1.575 m (5' 2\")   Wt 97.5 kg (215 lb)   LMP 2018   SpO2 100%   BMI 39.32 kg/m    BP (!) 154/100   Temp 97.9  F (36.6  C) (Oral)   Resp 16   Ht 1.575 m (5' 2\")   Wt 97.5 kg (215 lb)   LMP " 11/20/2018   SpO2 100%   BMI 39.32 kg/m    General: alert and moderate apparent distress  Head: atraumatic, normocephalic  Abd: Soft, epigastric abdominal tenderness to palpation, nondistended, no peritoneal signs  Musculoskel/Extremities: normal extremities, no edema, erythema, tenderness and full AROM of major joints without tenderness  Skin: no rashes, no diaphoresis and skin color normal  Neuro: Patient awake, alert, oriented, speech is fluent, gait is normal  Psychiatric: affect/mood normal, cooperative, normal judgement/insight and memory intact        ED Course        Procedures               Critical Care time:  none               No results found for this or any previous visit (from the past 24 hour(s)).    Medications   ranitidine (ZANTAC) tablet 150 mg (has no administration in time range)   lidocaine (XYLOCAINE) 2 % 15 mL, alum & mag hydroxide-simethicone (MYLANTA ES/MAALOX  ES) 15 mL GI Cocktail (30 mLs Oral Given 9/28/19 2124)       Assessments & Plan (with Medical Decision Making)  23 year old female, presenting to the emergency department with concerns regarding epigastric abdominal pain, beginning shortly after eating dinner.  Patient arrives afebrile, slightly hypertensive, otherwise normal vitals.  Has pain in the epigastric region, which occurs shortly after eating.  Symptoms concerning for gastritis, esophagitis, with possible peptic ulcer.  Given GI cocktail.  Symptoms improved.  Plan discharge home with daily omeprazole.  H2 blockers as needed.  Maalox can be taken as well.  Follow-up in clinic as needed.  Return if severe worsening of symptoms.  Patient is comfortable with this plan.  Instructed to minimize ibuprofen/NSAID use.     I have reviewed the nursing notes.    I have reviewed the findings, diagnosis, plan and need for follow up with the patient.       New Prescriptions    LIDOCAINE (XYLOCAINE) 2 % SOLUTION    Swish and spit 5-10 mLs in mouth every 3 hours as needed for moderate  pain ; Max 8 doses/24 hour period.       Final diagnoses:   Epigastric pain   Acute gastritis without hemorrhage, unspecified gastritis type       9/28/2019   [facility] EMERGENCY DEPARTMENT     Jus Du MD  09/28/19 3311

## 2019-09-29 NOTE — DISCHARGE INSTRUCTIONS
Recommend omeprazole, 20 mg daily.    You can take Zantac, or Pepcid as needed for pains.    You can also use Maalox in addition to the lidocaine which was prescribed.

## 2019-09-30 ENCOUNTER — PATIENT OUTREACH (OUTPATIENT)
Dept: CARE COORDINATION | Facility: CLINIC | Age: 24
End: 2019-09-30

## 2019-09-30 DIAGNOSIS — Z71.89 OTHER SPECIFIED COUNSELING: ICD-10-CM

## 2019-09-30 NOTE — PROGRESS NOTES
Clinic Care Coordination Contact      Patient was seen in [facility] ED on 9/28/19 with epigastric pain. Patient responded to GI cocktail and was discharged home.    Patient states she is feeling much better. Denies any abdominal pain, no nausea and no vomiting.     Patient states she took some Tums last evening and that relieved any GI symptoms.       Patient is 6 weeks post partum and has follow up visit this week with OB.     Clinic care coordination is not indicated.     Sheryl Nayak RN, CCM - Primary Care Clinic RN Coordinator  [facility]   9/30/2019    10:22 AM  [phone]

## 2019-10-02 ENCOUNTER — HEALTH MAINTENANCE LETTER (OUTPATIENT)
Age: 24
End: 2019-10-02

## 2019-10-30 ENCOUNTER — PRENATAL OFFICE VISIT (OUTPATIENT)
Dept: OBGYN | Facility: CLINIC | Age: 24
End: 2019-10-30
Payer: COMMERCIAL

## 2019-10-30 VITALS
HEIGHT: 62 IN | WEIGHT: 234 LBS | SYSTOLIC BLOOD PRESSURE: 125 MMHG | HEART RATE: 111 BPM | TEMPERATURE: 98.3 F | RESPIRATION RATE: 18 BRPM | DIASTOLIC BLOOD PRESSURE: 79 MMHG | BODY MASS INDEX: 43.06 KG/M2

## 2019-10-30 DIAGNOSIS — Z30.430 ENCOUNTER FOR IUD INSERTION: ICD-10-CM

## 2019-10-30 PROBLEM — Z34.03 PREGNANCY, SUPERVISION OF FIRST, THIRD TRIMESTER: Status: RESOLVED | Noted: 2019-06-26 | Resolved: 2019-10-30

## 2019-10-30 PROBLEM — Z98.891 S/P CESAREAN SECTION: Status: RESOLVED | Noted: 2019-08-21 | Resolved: 2019-10-30

## 2019-10-30 PROBLEM — Z97.5 IUD (INTRAUTERINE DEVICE) IN PLACE: Status: ACTIVE | Noted: 2019-10-30

## 2019-10-30 PROCEDURE — 58300 INSERT INTRAUTERINE DEVICE: CPT | Performed by: OBSTETRICS & GYNECOLOGY

## 2019-10-30 PROCEDURE — 99207 ZZC POST PARTUM EXAM: CPT | Performed by: OBSTETRICS & GYNECOLOGY

## 2019-10-30 ASSESSMENT — ANXIETY QUESTIONNAIRES
GAD7 TOTAL SCORE: 0
3. WORRYING TOO MUCH ABOUT DIFFERENT THINGS: NOT AT ALL
2. NOT BEING ABLE TO STOP OR CONTROL WORRYING: NOT AT ALL
1. FEELING NERVOUS, ANXIOUS, OR ON EDGE: NOT AT ALL
6. BECOMING EASILY ANNOYED OR IRRITABLE: NOT AT ALL
7. FEELING AFRAID AS IF SOMETHING AWFUL MIGHT HAPPEN: NOT AT ALL
5. BEING SO RESTLESS THAT IT IS HARD TO SIT STILL: NOT AT ALL

## 2019-10-30 ASSESSMENT — PATIENT HEALTH QUESTIONNAIRE - PHQ9
SUM OF ALL RESPONSES TO PHQ QUESTIONS 1-9: 1
5. POOR APPETITE OR OVEREATING: NOT AT ALL

## 2019-10-30 ASSESSMENT — MIFFLIN-ST. JEOR: SCORE: 1774.67

## 2019-10-30 NOTE — NURSING NOTE
"Initial /79 (BP Location: Right arm, Patient Position: Chair, Cuff Size: Adult Large)   Pulse 111   Temp 98.3  F (36.8  C) (Tympanic)   Resp 18   Ht 1.575 m (5' 2\")   Wt 106.1 kg (234 lb)   LMP 11/20/2018   Breastfeeding? Yes   BMI 42.80 kg/m   Estimated body mass index is 42.8 kg/m  as calculated from the following:    Height as of this encounter: 1.575 m (5' 2\").    Weight as of this encounter: 106.1 kg (234 lb). .      "

## 2019-10-30 NOTE — PROGRESS NOTES
"Ishmael is a 24 year old female 6 weeks S/P Primary  of a liveborn baby boy.  The delivery was uncomplicated.  She is not still bleeding.  She is bottlefeeding, and has selected Mirena IUD for birth control.  Her last PAP smear was , and was Normal; her  PHQ-9 inventory score is: 0    Various IUD's discussed with pt and she wishes to have Mirena IUD;  Informed consent rviewed and sigend      Exam: /79 (BP Location: Right arm, Patient Position: Chair, Cuff Size: Adult Large)   Pulse 111   Temp 98.3  F (36.8  C) (Tympanic)   Resp 18   Ht 1.575 m (5' 2\")   Wt 106.1 kg (234 lb)   LMP 2018   Breastfeeding? Yes   BMI 42.80 kg/m    EGBUS wnl, cervix parous, uterus small, non-tender, no adnexal masses, normal lochia and Pfannensteil incision healed    Procedure note:The patient was given informed consent which was signed and dated.  The patient was placed in a supine position and a speculum placed with the vagina, to visualize the cervix.  It was prepped with iodine and the anterior cervix grasped with a tenaculum.  The cervix was sounded and the Mirena  IUD placed in the cavity at the fundus. The string was trimmed 2-3cm from the external cervical os.  A post-procedure ultrasound was not performed to assure the IUD was in place in the uterine cavity.  The patient was given post-procedure instructions and left the clinic in stable condition.      Assessment:  Postpartum  IUD insertion    Plan:  Post IUD insertion instructions were reviewed  Pt aware of up to 5 years effectiveness and potential for absent or light menses  Ryanne Tian MD  [facility] [place]        "

## 2019-10-31 ASSESSMENT — ANXIETY QUESTIONNAIRES: GAD7 TOTAL SCORE: 0

## 2019-12-19 ENCOUNTER — MEDICAL CORRESPONDENCE (OUTPATIENT)
Dept: HEALTH INFORMATION MANAGEMENT | Facility: CLINIC | Age: 24
End: 2019-12-19

## 2020-10-03 ENCOUNTER — HOSPITAL ENCOUNTER (EMERGENCY)
Facility: CLINIC | Age: 25
Discharge: HOME | End: 2020-10-03
Attending: NURSE PRACTITIONER | Admitting: NURSE PRACTITIONER
Payer: COMMERCIAL

## 2020-10-03 VITALS
DIASTOLIC BLOOD PRESSURE: 83 MMHG | SYSTOLIC BLOOD PRESSURE: 133 MMHG | OXYGEN SATURATION: 97 % | HEIGHT: 62 IN | TEMPERATURE: 98.4 F | HEART RATE: 72 BPM | BODY MASS INDEX: 40.48 KG/M2 | RESPIRATION RATE: 16 BRPM | WEIGHT: 220 LBS

## 2020-10-03 DIAGNOSIS — J02.9 PHARYNGITIS: ICD-10-CM

## 2020-10-03 LAB
DEPRECATED S PYO AG THROAT QL EIA: NEGATIVE
SPECIMEN SOURCE: NORMAL
SPECIMEN SOURCE: NORMAL
STREP GROUP A PCR: NOT DETECTED

## 2020-10-03 PROCEDURE — G0463 HOSPITAL OUTPT CLINIC VISIT: HCPCS | Performed by: NURSE PRACTITIONER

## 2020-10-03 PROCEDURE — 999N001174 HC STATISTIC STREP A RAPID: Performed by: NURSE PRACTITIONER

## 2020-10-03 PROCEDURE — 87651 STREP A DNA AMP PROBE: CPT | Performed by: NURSE PRACTITIONER

## 2020-10-03 PROCEDURE — 99213 OFFICE O/P EST LOW 20 MIN: CPT | Performed by: NURSE PRACTITIONER

## 2020-10-03 ASSESSMENT — ENCOUNTER SYMPTOMS
FACIAL SWELLING: 0
COUGH: 0
DYSURIA: 0
DIARRHEA: 0
SORE THROAT: 1
VOMITING: 0
WEAKNESS: 0
TROUBLE SWALLOWING: 1
EYE REDNESS: 0
SHORTNESS OF BREATH: 0
STRIDOR: 0
SINUS PAIN: 0
FATIGUE: 0
VOICE CHANGE: 1
HEADACHES: 0
CHILLS: 0
ACTIVITY CHANGE: 0
DIFFICULTY URINATING: 0
FEVER: 0
WOUND: 0
WHEEZING: 0
ABDOMINAL PAIN: 0
APPETITE CHANGE: 0
ARTHRALGIAS: 0
COLOR CHANGE: 0
DIZZINESS: 0
CONFUSION: 0

## 2020-10-03 ASSESSMENT — MIFFLIN-ST. JEOR: SCORE: 1706.16

## 2020-10-03 NOTE — LETTER
October 3, 2020      To Whom It May Concern:      Saundra Urrutia was seen in our Emergency Department today, 10/03/20.  I expect her condition to improve over the next few days.  She may return to work/school when improved.    Sincerely,        ALBA An CNP        
Principal Discharge DX:	Toe fracture

## 2020-10-03 NOTE — ED PROVIDER NOTES
History     Chief Complaint   Patient presents with     Pharyngitis     HPI  SUBJECTIVE: Saundra Urrutia  is here today because of:Sore Throat  The patient has had symptoms of sore throat, swollen glands and hoarseness.   Onset of symptoms was 3 day ago. Course of illness is same.  Patient denies exposure to illness at home or work/school.   Patient denies fever, cough, earache, vomiting, diarrhea, headache, chest congestion, wheezing and dizziness, abdominal pain, confusion, or thoughts of harming self  Patient is not exposed to tobacco    Allergies:  Allergies   Allergen Reactions     Sulfa Drugs Other (See Comments)     Red eye from eye drops.  Has never had oral or IV form.       Problem List:    Patient Active Problem List    Diagnosis Date Noted     IUD (intrauterine device) in place 10/30/2019     Priority: Medium     Mirena IUD placed 10/30/2019         Major depressive disorder, recurrent episode, moderate (H) 2018     Priority: Medium     Bilateral carpal tunnel syndrome 2018     Priority: Medium     Obesity 2014     Priority: Medium     Hyperhidrosis 2013     Priority: Medium     HL (hearing loss) 2008     Priority: Medium     Saw audiology , sent to ENT           Past Medical History:    Past Medical History:   Diagnosis Date     Anxiety      Depression      Depressive disorder      Self-injurious behavior 2013       Past Surgical History:    Past Surgical History:   Procedure Laterality Date      SECTION N/A 2019    Procedure:  SECTION;  Surgeon: Eleanor Gramajo MD;  Location: [facility] OR     NO HISTORY OF SURGERY         Family History:    Family History   Problem Relation Age of Onset     Diabetes Paternal Grandfather      Cancer Other      Depression Maternal Grandmother      Depression Mother      Substance Abuse Mother         recovered  A&D     Diabetes Father      Depression Father      Heart Disease Father      Substance Abuse Father  "        A&D     Breast Cancer Paternal Grandmother      C.A.D. No family hx of        Social History:  Marital Status:  Single [1]  Social History     Tobacco Use     Smoking status: Former Smoker     Packs/day: 0.01     Types: Cigarettes     Smokeless tobacco: Never Used     Tobacco comment: uses E-cig   Substance Use Topics     Alcohol use: Not Currently     Comment: rare-quit with pregnancy     Drug use: Yes     Types: Marijuana     Comment: last use 12/10/18        Medications:         sertraline (ZOLOFT) 50 MG tablet      Review of Systems   Constitutional: Negative for activity change, appetite change, chills, fatigue and fever.   HENT: Positive for sore throat, trouble swallowing and voice change. Negative for congestion, ear pain, facial swelling and sinus pain.    Eyes: Negative for redness and visual disturbance.   Respiratory: Negative for cough, shortness of breath, wheezing and stridor.    Cardiovascular: Negative for chest pain.   Gastrointestinal: Negative for abdominal pain, diarrhea and vomiting.   Genitourinary: Negative for difficulty urinating and dysuria.   Musculoskeletal: Negative for arthralgias.   Skin: Negative for color change, rash and wound.   Neurological: Negative for dizziness, weakness and headaches.   Psychiatric/Behavioral: Negative for confusion and self-injury.   All other systems reviewed and are negative.      Physical Exam   BP: 133/83  Pulse: 72  Temp: 98.4  F (36.9  C)  Resp: 16  Height: 157.5 cm (5' 2\")  Weight: 99.8 kg (220 lb)  SpO2: 97 %      Physical Exam  GENERAL: alert, no acute distress and appears uncomfortable  EYES:  Right conjunctiva is not injected and without discharge.  Left conjunctiva is not injected and without discharge.  EARS: Right TM is normal: no effusions, no erythema, and normal landmarks.  Left TM is normal: no effusions, no erythema, and normal landmarks.  NOSE: Nasal mucosa is normal.  Sinus not tender.  THROAT: 2+ tonsillar hypertrophy, " red/erythematous and exudate bilateral, uvula midline  NECK: supple with enlarged lymph nodes in the anterior cervical area.  CARDIAC:NORMAL - regular rate and rhythm without murmur.  RESP: Normal - CTA without rales, rhonchi, or wheezing.  SKIN: normal    ED Course        Procedures    Results for orders placed or performed during the hospital encounter of 10/03/20 (from the past 24 hour(s))   Streptococcus A Rapid Scr w Reflx to PCR    Specimen: Throat   Result Value Ref Range    Strep Specimen Description Throat     Streptococcus Group A Rapid Screen Negative NEG^Negative       Medications - No data to display    Assessments & Plan (with Medical Decision Making)     I have reviewed the nursing notes.    I have reviewed the findings, diagnosis, plan and need for follow up with the patient.  Medical Decision Making:  CXR is not indicated.  Rapid Strep test is indicated.  COVID test offered and declined.    Assessment:  1) Viral pharyngitis.    PLAN:  Use acetaminophen, ibuprofen, gargle with salt water, throat lozenges prn, increase fluids and rest.   Follow up with any questions or problems    New Prescriptions    No medications on file       Final diagnoses:   Pharyngitis       10/3/2020   [facility] EMERGENCY DEPT     Stephania Quigley, APRN CNP  10/03/20 [id number]

## 2020-10-03 NOTE — ED AVS SNAPSHOT
[facility]  [address] [address]  Phone: [phone]  Fax: [phone]                                    Saundra Urrutia   MRN: [id number]    Department: [facility]   Date of Visit: 10/3/2020           After Visit Summary Signature Page    I have received my discharge instructions, and my questions have been answered. I have discussed any challenges I see with this plan with the nurse or doctor.    ..........................................................................................................................................  Patient/Patient Representative Signature      ..........................................................................................................................................  Patient Representative Print Name and Relationship to Patient    ..................................................               ................................................  Date                                   Time    ..........................................................................................................................................  Reviewed by Signature/Title    ...................................................              ..............................................  Date                                               Time          22EPIC Rev 08/18

## 2020-10-23 ENCOUNTER — OFFICE VISIT (OUTPATIENT)
Dept: FAMILY MEDICINE | Facility: CLINIC | Age: 25
End: 2020-10-23
Payer: COMMERCIAL

## 2020-10-23 VITALS
HEIGHT: 62 IN | TEMPERATURE: 97.7 F | WEIGHT: 213.3 LBS | HEART RATE: 79 BPM | SYSTOLIC BLOOD PRESSURE: 113 MMHG | DIASTOLIC BLOOD PRESSURE: 72 MMHG | BODY MASS INDEX: 39.25 KG/M2

## 2020-10-23 DIAGNOSIS — Z23 NEED FOR PROPHYLACTIC VACCINATION AND INOCULATION AGAINST INFLUENZA: ICD-10-CM

## 2020-10-23 DIAGNOSIS — F33.1 MAJOR DEPRESSIVE DISORDER, RECURRENT EPISODE, MODERATE (H): ICD-10-CM

## 2020-10-23 DIAGNOSIS — F41.1 GAD (GENERALIZED ANXIETY DISORDER): ICD-10-CM

## 2020-10-23 DIAGNOSIS — G47.09 OTHER INSOMNIA: Primary | ICD-10-CM

## 2020-10-23 PROCEDURE — 90686 IIV4 VACC NO PRSV 0.5 ML IM: CPT | Performed by: FAMILY MEDICINE

## 2020-10-23 PROCEDURE — 90471 IMMUNIZATION ADMIN: CPT | Performed by: FAMILY MEDICINE

## 2020-10-23 PROCEDURE — 99214 OFFICE O/P EST MOD 30 MIN: CPT | Mod: 25 | Performed by: FAMILY MEDICINE

## 2020-10-23 RX ORDER — HYDROXYZINE HYDROCHLORIDE 10 MG/1
10-30 TABLET, FILM COATED ORAL 3 TIMES DAILY PRN
Qty: 60 TABLET | Refills: 0 | Status: SHIPPED | OUTPATIENT
Start: 2020-10-23 | End: 2021-02-19

## 2020-10-23 ASSESSMENT — ANXIETY QUESTIONNAIRES
7. FEELING AFRAID AS IF SOMETHING AWFUL MIGHT HAPPEN: SEVERAL DAYS
GAD7 TOTAL SCORE: 8
2. NOT BEING ABLE TO STOP OR CONTROL WORRYING: SEVERAL DAYS
6. BECOMING EASILY ANNOYED OR IRRITABLE: MORE THAN HALF THE DAYS
5. BEING SO RESTLESS THAT IT IS HARD TO SIT STILL: SEVERAL DAYS
3. WORRYING TOO MUCH ABOUT DIFFERENT THINGS: SEVERAL DAYS
1. FEELING NERVOUS, ANXIOUS, OR ON EDGE: MORE THAN HALF THE DAYS

## 2020-10-23 ASSESSMENT — MIFFLIN-ST. JEOR: SCORE: 1675.77

## 2020-10-23 ASSESSMENT — PATIENT HEALTH QUESTIONNAIRE - PHQ9
5. POOR APPETITE OR OVEREATING: NOT AT ALL
SUM OF ALL RESPONSES TO PHQ QUESTIONS 1-9: 9

## 2020-10-23 NOTE — PROGRESS NOTES
"SUBJECTIVE:                                                    Saundra Urrutia is a 25 year old female who presents to clinic today for the following health issues:    Medication Followup of Zoloft     Taking Medication as prescribed: NO- stopped about 3 months ago had a lot of life changes recently.     Side Effects:  None    Medication Helping Symptoms:  NO, maybe slight but no motivation.      ALTAGRACIA/ MDD f/u     Was on zoloft, stopped it about three months ago   Took it for a couple years at 50-100mg     Has tried prozac and celexa in the past   Doesn't think she's tried wellbutrin or effexor     Got out of a toxic relationship and that has helped her mood a lot. 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   No si/hi  Wants to do some counseling. Doesn't want to be on a med at this time     Review of systems:   No headache  No cp/sob/cough  No n/v       Problem list and histories reviewed & adjusted, as indicated.  Additional history: as documented     Patient Active Problem List   Diagnosis     HL (hearing loss)     Hyperhidrosis     Obesity     Bilateral carpal tunnel syndrome     Major depressive disorder, recurrent episode, moderate (H)     IUD (intrauterine device) in place     ALTAGRACIA (generalized anxiety disorder)     Other insomnia     Current Outpatient Medications   Medication     sertraline (ZOLOFT) 50 MG tablet     No current facility-administered medications for this visit.        OBJECTIVE:                                                    /72   Pulse 79   Temp 97.7  F (36.5  C) (Tympanic)   Ht 1.575 m (5' 2\")   Wt 96.8 kg (213 lb 4.8 oz)   BMI 39.01 kg/m   Body mass index is 39.01 kg/m .   .GENERAL - Pt is alert and oriented in no " acute distress.  Affect is appropriate. Good eye contact.  PSYCH - Pt makes good eye contact, is clean and well-dressed. Oriented to person,place,and time. Cooperative. No speech abnormalities. No psychomotor agitation or retardation.  Thought process was normal and thought content was free of suicidal/homicidal ideation, obsessions, and delusions. Insight and judgement were good.         ASSESSMENT/PLAN:                                                      (G47.09) Other insomnia  (primary encounter diagnosis)  Comment: Discussed sleep hygiene. Will try evening atarax for sleep and anxiety.  The patient indicates understanding of these issues and agrees with the plan. The patient indicates understanding of these issues and agrees with the plan.   Plan:     (F33.1) Major depressive disorder, recurrent episode, moderate (H)  Comment: referral made for counseling. Discussed depression/anxiety meds and will hold off for now but encouraged her to let me know if mood is worsening so that she can get on a mood sooner rather than later. The patient indicates understanding of these issues and agrees with the plan.   Plan:     (F41.1) ALTAGRACIA (generalized anxiety disorder)  Comment:   Plan:     (Z23) Need for prophylactic vaccination and inoculation against influenza  Comment:   Plan: INFLUENZA VACCINE IM > 6 MONTHS VALENT IIV4         [92676], Vaccine Administration, Initial         [54406]            DORA Dumont MD ( [facility]

## 2020-10-24 ASSESSMENT — ANXIETY QUESTIONNAIRES: GAD7 TOTAL SCORE: 8

## 2020-12-11 ENCOUNTER — TELEPHONE (OUTPATIENT)
Dept: FAMILY MEDICINE | Facility: CLINIC | Age: 25
End: 2020-12-11

## 2020-12-11 ENCOUNTER — VIRTUAL VISIT (OUTPATIENT)
Dept: FAMILY MEDICINE | Facility: CLINIC | Age: 25
End: 2020-12-11
Payer: COMMERCIAL

## 2020-12-11 DIAGNOSIS — F41.1 GAD (GENERALIZED ANXIETY DISORDER): Primary | ICD-10-CM

## 2020-12-11 DIAGNOSIS — F33.1 MAJOR DEPRESSIVE DISORDER, RECURRENT EPISODE, MODERATE (H): ICD-10-CM

## 2020-12-11 PROCEDURE — 99213 OFFICE O/P EST LOW 20 MIN: CPT | Mod: 95 | Performed by: FAMILY MEDICINE

## 2020-12-11 ASSESSMENT — ANXIETY QUESTIONNAIRES
3. WORRYING TOO MUCH ABOUT DIFFERENT THINGS: SEVERAL DAYS
IF YOU CHECKED OFF ANY PROBLEMS ON THIS QUESTIONNAIRE, HOW DIFFICULT HAVE THESE PROBLEMS MADE IT FOR YOU TO DO YOUR WORK, TAKE CARE OF THINGS AT HOME, OR GET ALONG WITH OTHER PEOPLE: VERY DIFFICULT
2. NOT BEING ABLE TO STOP OR CONTROL WORRYING: SEVERAL DAYS
1. FEELING NERVOUS, ANXIOUS, OR ON EDGE: NOT AT ALL
6. BECOMING EASILY ANNOYED OR IRRITABLE: SEVERAL DAYS
GAD7 TOTAL SCORE: 3
5. BEING SO RESTLESS THAT IT IS HARD TO SIT STILL: NOT AT ALL
7. FEELING AFRAID AS IF SOMETHING AWFUL MIGHT HAPPEN: NOT AT ALL

## 2020-12-11 ASSESSMENT — PATIENT HEALTH QUESTIONNAIRE - PHQ9
SUM OF ALL RESPONSES TO PHQ QUESTIONS 1-9: 7
5. POOR APPETITE OR OVEREATING: NOT AT ALL

## 2020-12-11 NOTE — TELEPHONE ENCOUNTER
Prior Authorization Retail Medication Request    Medication/Dose:   ICD code (if different than what is on RX):    ALTAGRACIA (generalized anxiety disorder) [F41.1]  - Primary       Major depressive disorder, recurrent episode, moderate (H) [F33.1]         Previously Tried and Failed:    Rationale:      Insurance Name:  MERT ERAZO  Covermymeds:  Key: [id number]  Last Name: Renan  : 1995      Pharmacy Information (if different than what is on RX)  Name:  [facility]  Phone:  [phone]

## 2020-12-11 NOTE — PATIENT INSTRUCTIONS
Sertraline/zoloft taper up:     Take 1/2 tablet (25 mg) for 3-5 days, then increase to 1 tablet ( 50mg) for 3-5 days, then increase to 1.5 tablets ( 75mg) for 3-5 days, then increase to 2 tablets ( 100mg) and stay at this dose      Call counseling number and get an appointment     See me again in 4 week or sooner if you have issues/concerns    Thanks!

## 2020-12-11 NOTE — PROGRESS NOTES
"Saundra Urrutia is a 25 year old female who is being evaluated via a billable video visit.      The patient has been notified of following:     \"This video visit will be conducted via a call between you and your physician/provider. We have found that certain health care needs can be provided without the need for an in-person physical exam.  This service lets us provide the care you need with a video conversation.  If a prescription is necessary we can send it directly to your pharmacy.  If lab work is needed we can place an order for that and you can then stop by our lab to have the test done at a later time.    Video visits are billed at different rates depending on your insurance coverage.  Please reach out to your insurance provider with any questions.    If during the course of the call the physician/provider feels a video visit is not appropriate, you will not be charged for this service.\"    Patient has given verbal consent for Video visit? Yes  How would you like to obtain your AVS? MyChart  If you are dropped from the video visit, the video invite should be resent to: Text to cell phone: [phone]  Will anyone else be joining your video visit? No    Subjective     Saundra Urrutia is a 25 year old female who presents today via video visit for the following health issues:    HPI     Patient wants to get back on Sertraline for about six months but is looking to get back on the medication.      Symptoms ongoing for 6-8 weeks  Low motivation  Irritable with family   Eating and sleeping ok   No si/hi     Working at [employer]    Was on zoloft and it was working - would like to restart       ALTAGRACIA-7   [employer], 2002; Used with Permission) 12/11/2020   Over the last 2 weeks, how often have you been bothered by feeling nervous, anxious or on edge?    Over the last 2 weeks, how often have you been bothered by not being able to stop or control worrying?    Over the last 2 weeks, how often have you been bothered by " worrying too much about different things?    Over the last 2 weeks, how often have you been bothered by trouble relaxing?    Over the last 2 weeks, how often have you been bothered by being so restless that it is hard to sit still?    Over the last 2 weeks, how often have you been bothered by becoming easily annoyed or irritable?    Over the last 2 weeks, how often have you been bothered by feeling afraid as if something awful might happen?    ALTAGRACIA-7 Total Score =     1. Feeling nervous, anxious, or on edge    2. Not being able to stop or control worrying    3. Worrying too much about different things    4. Trouble relaxing    5. Being so restless that it is hard to sit still    6. Becoming easily annoyed or irritable    7. Feeling afraid, as if something awful might happen    ALTAGRACIA 7 TOTAL SCORE    1. Feeling nervous, anxious, or on edge 0   2. Not being able to stop or control worrying 1   3. Worrying too much about different things 1   4. Trouble relaxing 0   5. Being so restless that it is hard to sit still 0   6. Becoming easily annoyed or irritable 1   7. Feeling afraid, as if something awful might happen 0   ALTAGRACIA-7 Total Score 3   If you checked any problems, how difficult have they made it for you to do your work, take care of things at home, or get along with other people? Very difficult   PHQ-9 ([employer]) 12/11/2020   No Interest In Doing Things    Feeling Depressed    Trouble Sleeping    Tired / No Energy    No appetite or Over-Eating    Feeling Bad about Self    Trouble Concentrating    Moving Slow or Restless    Suicidal Thoughts    Total Score    1.  Little interest or pleasure in doing things 2   2.  Feeling down, depressed, or hopeless 1   3.  Trouble falling or staying asleep, or sleeping too much 1   4.  Feeling tired or having little energy 1   5.  Poor appetite or overeating 1   6.  Feeling bad about yourself 0   7.  Trouble concentrating 1   8.  Moving slowly or restless 0   9.  Suicidal or self-harm  thoughts 0   PHQ-9 Total Score 7   Difficulty at work, home, or with people Very difficult   1.  Little interest or pleasure in doing things    2.  Feeling down, depressed, or hopeless    3.  Trouble falling or staying asleep, or sleeping too much    4.  Feeling tired or having little energy    5.  Poor appetite or overeating    6.  Feeling bad about yourself    7.  Trouble concentrating    8.  Moving slowly or restless    9.  Suicidal or self-harm thoughts    PHQ-9 via [place] TOTAL SCORE----->    Difficulty at work, home, or with people        review of systems:  No headache  No tremor      Video Start Time: 1:42 PM      Objective           Vitals:  No vitals were obtained today due to virtual visit.    Physical Exam     GENERAL: Healthy, alert and no distress  EYES: Eyes grossly normal to inspection.  No discharge or erythema, or obvious scleral/conjunctival abnormalities.  RESP: No audible wheeze, cough, or visible cyanosis.  No visible retractions or increased work of breathing.    SKIN: Visible skin clear. No significant rash, abnormal pigmentation or lesions.  NEURO: Cranial nerves grossly intact.  Mentation and speech appropriate for age.  PSYCH: Mentation appears normal, affect normal/bright, judgement and insight intact, normal speech and appearance well-groomed.          Assessment & Plan     ALTAGRACIA (generalized anxiety disorder)  Major depressive disorder, recurrent episode, moderate (H)  Discussed diagnosis - mdd and altagracia, recurrent . Discussed medication options and risks/benefits/side effects. Discussed non-pharmacological adjuncts to therapy including tobacco and alcohol cessation, regular sleep, regular exercise, healthy diet, stress reduction, and counseling.  she  is interested in trying a medication today and we chose to restart zoloft- taper up discussed. She will call for counseling referral, still has information  . she is advised to seek care immediately for thoughts of suicide or self harm and  "pt is to call for questions, issues or any concerns. RTC in 4 weeks - video is ok - for recheck or sooner prn.  The patient indicates understanding of these issues and agrees with the plan.   - sertraline (ZOLOFT) 50 MG tablet; Take 2 tablets (100 mg) by mouth daily See taper up directions         BMI:   Estimated body mass index is 39.01 kg/m  as calculated from the following:    Height as of 10/23/20: 1.575 m (5' 2\").    Weight as of 10/23/20: 96.8 kg (213 lb 4.8 oz).   Weight management plan: Discussed healthy diet and exercise guidelines       No follow-ups on file.    Karla Dumont MD  [facility]      Video-Visit Details    Type of service:  Video Visit    Video End Time:1:51 PM    Originating Location (pt. Location): Home    Distant Location (provider location):  [facility]     Platform used for Video Visit: Portillo        "

## 2020-12-12 ASSESSMENT — ANXIETY QUESTIONNAIRES: GAD7 TOTAL SCORE: 3

## 2020-12-14 NOTE — TELEPHONE ENCOUNTER
Central Prior Authorization Team   Phone: [phone]      PA Initiation    Medication: Sertarline HCL 50mg Tabs-Initiated  Insurance Company: [employer] PMA - Phone [phone] Fax [phone]  Pharmacy Filling the Rx: [facility] [address] [place] [address] - [address]  Filling Pharmacy Phone: [phone]  Filling Pharmacy Fax:    Start Date: 12/14/2020

## 2020-12-15 NOTE — TELEPHONE ENCOUNTER
Prior Authorization Approval    Authorization Effective Date: 9/15/2020  Authorization Expiration Date: 1/15/2021  Medication: Sertarline HCL 50mg Tabs-APPROVED  Approved Dose/Quantity:   Reference #:     Insurance Company: [employer] Plus PMAP - Phone [phone] Fax [phone]  Expected CoPay:       CoPay Card Available:      Foundation Assistance Needed:    Which Pharmacy is filling the prescription (Not needed for infusion/clinic administered): [facility] - [place], [place] - [address]  Pharmacy Notified: Yes  Patient Notified: No    Pharmacy will notify patient when medication is ready.

## 2021-01-15 ENCOUNTER — HEALTH MAINTENANCE LETTER (OUTPATIENT)
Age: 26
End: 2021-01-15

## 2021-02-19 ENCOUNTER — OFFICE VISIT (OUTPATIENT)
Dept: FAMILY MEDICINE | Facility: CLINIC | Age: 26
End: 2021-02-19
Payer: COMMERCIAL

## 2021-02-19 VITALS
TEMPERATURE: 97.1 F | SYSTOLIC BLOOD PRESSURE: 112 MMHG | HEART RATE: 80 BPM | BODY MASS INDEX: 37.02 KG/M2 | DIASTOLIC BLOOD PRESSURE: 54 MMHG | WEIGHT: 202.4 LBS

## 2021-02-19 DIAGNOSIS — G47.09 OTHER INSOMNIA: ICD-10-CM

## 2021-02-19 DIAGNOSIS — F41.1 GAD (GENERALIZED ANXIETY DISORDER): Primary | ICD-10-CM

## 2021-02-19 DIAGNOSIS — F33.1 MAJOR DEPRESSIVE DISORDER, RECURRENT EPISODE, MODERATE (H): ICD-10-CM

## 2021-02-19 DIAGNOSIS — T14.8XXA BRUISE: ICD-10-CM

## 2021-02-19 PROCEDURE — 99213 OFFICE O/P EST LOW 20 MIN: CPT | Performed by: FAMILY MEDICINE

## 2021-02-19 RX ORDER — HYDROXYZINE HYDROCHLORIDE 10 MG/1
10-30 TABLET, FILM COATED ORAL 3 TIMES DAILY PRN
Qty: 60 TABLET | Refills: 1 | Status: SHIPPED | OUTPATIENT
Start: 2021-02-19 | End: 2021-07-09

## 2021-02-19 RX ORDER — SERTRALINE HYDROCHLORIDE 100 MG/1
100 TABLET, FILM COATED ORAL DAILY
Qty: 90 TABLET | Refills: 1 | Status: SHIPPED | OUTPATIENT
Start: 2021-02-19 | End: 2021-07-09

## 2021-02-19 ASSESSMENT — ANXIETY QUESTIONNAIRES
6. BECOMING EASILY ANNOYED OR IRRITABLE: NOT AT ALL
3. WORRYING TOO MUCH ABOUT DIFFERENT THINGS: NOT AT ALL
5. BEING SO RESTLESS THAT IT IS HARD TO SIT STILL: NOT AT ALL
2. NOT BEING ABLE TO STOP OR CONTROL WORRYING: NOT AT ALL
GAD7 TOTAL SCORE: 0
1. FEELING NERVOUS, ANXIOUS, OR ON EDGE: NOT AT ALL
7. FEELING AFRAID AS IF SOMETHING AWFUL MIGHT HAPPEN: NOT AT ALL

## 2021-02-19 ASSESSMENT — PATIENT HEALTH QUESTIONNAIRE - PHQ9
5. POOR APPETITE OR OVEREATING: NOT AT ALL
SUM OF ALL RESPONSES TO PHQ QUESTIONS 1-9: 2

## 2021-02-19 NOTE — NURSING NOTE
"No chief complaint on file.      Initial /54   Pulse 80   Temp 97.1  F (36.2  C) (Tympanic)   Wt 91.8 kg (202 lb 6.4 oz)   LMP 02/12/2021   BMI 37.02 kg/m   Estimated body mass index is 37.02 kg/m  as calculated from the following:    Height as of 10/23/20: 1.575 m (5' 2\").    Weight as of this encounter: 91.8 kg (202 lb 6.4 oz).    Patient presents to the clinic using No DME    Health Maintenance that is potentially due pending provider review:  NONE    n/a    Is there anyone who you would like to be able to receive your results? No  If yes have patient fill out [facility]RIST[facility]  Kannan Jacobs M.A.          "

## 2021-02-19 NOTE — PROGRESS NOTES
Assessment & Plan     ALTAGRACIA (generalized anxiety disorder)  Major depressive disorder, recurrent episode, moderate (H)  Doing well overall. Will stay at 100mg for another month and if still wanting more benefit from the med, will consider increasing to 150.  Ok to continue to use hydroxyzine prn.  Check in via virtual visit. The patient indicates understanding of these issues and agrees with the plan.   - hydrOXYzine (ATARAX) 10 MG tablet; Take 1-3 tablets (10-30 mg) by mouth 3 times daily as needed for anxiety  - sertraline (ZOLOFT) 100 MG tablet; Take 1 tablet (100 mg) by mouth daily    Other insomnia    - hydrOXYzine (ATARAX) 10 MG tablet; Take 1-3 tablets (10-30 mg) by mouth 3 times daily as needed for anxiety    Bruise  No red flags. She will monitor and let me know if symptoms change.     Declines chlamydia testing                    Return in about 4 weeks (around 3/19/2021) for Depression/Anxiety follow-up  virtual is fine .    Karla Dumont MD  [facility] is a 25 year old who presents for the following health issues; depression/anxiety.    HPI     Virtual visit 12/11/2020 -  Restarted the zoloft and tapered up to 100mg/day   It was helping a lot then insurance wouldn't pay for it and she ran out for a few weeks.       Depression and Anxiety Follow-Up    How are you doing with your depression since your last visit? Doing ok.  Thinks maybe needs to increase med a little bit.     How are you doing with your anxiety since your last visit?  Doing ok    Are you having other symptoms that might be associated with depression or anxiety? No    Have you had a significant life event? No     Do you have any concerns with your use of alcohol or other drugs? No    Social History     Tobacco Use     Smoking status: Former Smoker     Packs/day: 0.01     Types: Cigarettes     Smokeless tobacco: Never Used     Tobacco comment: uses E-cig   Substance Use Topics     Alcohol use:  Not Currently     Comment: rare-quit with pregnancy     Drug use: Yes     Types: Marijuana     Comment: last use 12/10/18     PHQ 10/30/2019 10/23/2020 12/11/2020   PHQ-9 Total Score 1 9 7   Q9: Thoughts of better off dead/self-harm past 2 weeks Not at all Not at all Not at all       Hydroxyzine - takes it occasionally    Feels she has been bruising easily  A few weeks  No gum bleeding  No easy bleeding    Suicide Assessment Five-step Evaluation and Treatment (SAFE-T)      How many servings of fruits and vegetables do you eat daily?  2-3    On average, how many sweetened beverages do you drink each day (Examples: soda, juice, sweet tea, etc.  Do NOT count diet or artificially sweetened beverages)?   2    How many days per week do you miss taking your medication? 0       Review of Systems   Constitutional, HEENT, cardiovascular, pulmonary, gi and gu systems are negative, except as otherwise noted.      Objective    /54   Pulse 80   Temp 97.1  F (36.2  C) (Tympanic)   Wt 91.8 kg (202 lb 6.4 oz)   LMP 02/12/2021   BMI 37.02 kg/m    Body mass index is 37.02 kg/m .  Physical Exam   GENERAL: healthy, alert and no distress  RESP: lungs clear to auscultation - no rales, rhonchi or wheezes  CV: regular rate and rhythm, normal S1 S2, no S3 or S4, no murmur, click or rub, no peripheral edema and peripheral pulses strong  A few scattered small bruises on the lower extrems. No bruising on the rest of her body.

## 2021-02-20 ASSESSMENT — ANXIETY QUESTIONNAIRES: GAD7 TOTAL SCORE: 0

## 2021-07-09 ENCOUNTER — OFFICE VISIT (OUTPATIENT)
Dept: FAMILY MEDICINE | Facility: CLINIC | Age: 26
End: 2021-07-09
Payer: COMMERCIAL

## 2021-07-09 VITALS
BODY MASS INDEX: 39.93 KG/M2 | SYSTOLIC BLOOD PRESSURE: 126 MMHG | WEIGHT: 211.5 LBS | HEART RATE: 69 BPM | TEMPERATURE: 97.7 F | HEIGHT: 61 IN | DIASTOLIC BLOOD PRESSURE: 72 MMHG

## 2021-07-09 DIAGNOSIS — F33.1 MAJOR DEPRESSIVE DISORDER, RECURRENT EPISODE, MODERATE (H): ICD-10-CM

## 2021-07-09 DIAGNOSIS — Z13.220 SCREENING FOR LIPID DISORDERS: ICD-10-CM

## 2021-07-09 DIAGNOSIS — Z23 HIGH PRIORITY FOR 2019-NCOV VACCINE: ICD-10-CM

## 2021-07-09 DIAGNOSIS — G56.03 BILATERAL CARPAL TUNNEL SYNDROME: ICD-10-CM

## 2021-07-09 DIAGNOSIS — Z11.3 SCREEN FOR STD (SEXUALLY TRANSMITTED DISEASE): ICD-10-CM

## 2021-07-09 DIAGNOSIS — F41.1 GAD (GENERALIZED ANXIETY DISORDER): ICD-10-CM

## 2021-07-09 DIAGNOSIS — Z00.01 ENCOUNTER FOR ROUTINE ADULT MEDICAL EXAM WITH ABNORMAL FINDINGS: Primary | ICD-10-CM

## 2021-07-09 PROCEDURE — 99213 OFFICE O/P EST LOW 20 MIN: CPT | Mod: 25 | Performed by: FAMILY MEDICINE

## 2021-07-09 PROCEDURE — 0011A COVID-19,PF,MODERNA: CPT | Performed by: FAMILY MEDICINE

## 2021-07-09 PROCEDURE — 87491 CHLMYD TRACH DNA AMP PROBE: CPT | Performed by: FAMILY MEDICINE

## 2021-07-09 PROCEDURE — 87591 N.GONORRHOEAE DNA AMP PROB: CPT | Performed by: FAMILY MEDICINE

## 2021-07-09 PROCEDURE — 99395 PREV VISIT EST AGE 18-39: CPT | Performed by: FAMILY MEDICINE

## 2021-07-09 PROCEDURE — 91301 COVID-19,PF,MODERNA: CPT | Performed by: FAMILY MEDICINE

## 2021-07-09 RX ORDER — SERTRALINE HYDROCHLORIDE 100 MG/1
100 TABLET, FILM COATED ORAL DAILY
Qty: 90 TABLET | Refills: 3 | Status: SHIPPED | OUTPATIENT
Start: 2021-07-09 | End: 2022-09-12

## 2021-07-09 ASSESSMENT — ENCOUNTER SYMPTOMS
DYSURIA: 0
NERVOUS/ANXIOUS: 0
CHILLS: 0
EYE PAIN: 0
SHORTNESS OF BREATH: 0
PARESTHESIAS: 0
CONSTIPATION: 0
HEADACHES: 0
COUGH: 0
PALPITATIONS: 0
ARTHRALGIAS: 0
HEMATURIA: 0
FEVER: 0
SORE THROAT: 0
HEMATOCHEZIA: 0
DIZZINESS: 0
BREAST MASS: 0
NAUSEA: 0
ABDOMINAL PAIN: 0
HEARTBURN: 0
MYALGIAS: 0
WEAKNESS: 0
JOINT SWELLING: 0
DIARRHEA: 0
FREQUENCY: 0

## 2021-07-09 ASSESSMENT — ANXIETY QUESTIONNAIRES
5. BEING SO RESTLESS THAT IT IS HARD TO SIT STILL: NOT AT ALL
1. FEELING NERVOUS, ANXIOUS, OR ON EDGE: NOT AT ALL
6. BECOMING EASILY ANNOYED OR IRRITABLE: NOT AT ALL
7. FEELING AFRAID AS IF SOMETHING AWFUL MIGHT HAPPEN: NOT AT ALL
3. WORRYING TOO MUCH ABOUT DIFFERENT THINGS: NOT AT ALL
2. NOT BEING ABLE TO STOP OR CONTROL WORRYING: NOT AT ALL
GAD7 TOTAL SCORE: 0

## 2021-07-09 ASSESSMENT — PATIENT HEALTH QUESTIONNAIRE - PHQ9
5. POOR APPETITE OR OVEREATING: NOT AT ALL
SUM OF ALL RESPONSES TO PHQ QUESTIONS 1-9: 0

## 2021-07-09 ASSESSMENT — MIFFLIN-ST. JEOR: SCORE: 1655.7
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
## 2021-07-10 ASSESSMENT — ANXIETY QUESTIONNAIRES: GAD7 TOTAL SCORE: 0

## 2021-07-19 NOTE — PROGRESS NOTES
SUBJECTIVE:  Saundra Urrutia is a 25 year old female who is seen in consultation at the request of Dr. Dumont, for Bilateral hand problems x 3 years  Symptoms:  Constant numbness in the tips of the first two fingers of the right hand  Wearing braces at night - no help now, but helped.  Any grasping makes her whole hand go numb .      Other symptoms:  Right slightly worse than left  Radial 3 digits   Hands feel weak.  Her job activities have become slower.  No dropping things  Trouble identifying things by touch.  .   Treatment: wrist braces, nsaids.    Job description:     Past Medical History:   Diagnosis Date     Anxiety      Depression      Depressive disorder     taking meds     Self-injurious behavior 2013    Superficial cutting       Past Surgical History:   Procedure Laterality Date      SECTION N/A 2019    Procedure:  SECTION;  Surgeon: Eleanor Gramajo MD;  Location: [facility] OR     NO HISTORY OF SURGERY          REVIEW OF SYSTEMS:  CONSTITUTIONAL:  NEGATIVE for fever, chills, change in weight  INTEGUMENTARY/SKIN:  NEGATIVE for worrisome rashes, moles or lesions  EYES:  NEGATIVE for vision changes or irritation  ENT/MOUTH:  NEGATIVE for ear, mouth and throat problems  RESP:  NEGATIVE for significant cough or SOB  BREAST:  NEGATIVE for masses, tenderness or discharge  CV:  NEGATIVE for chest pain, palpitations or peripheral edema  GI:  NEGATIVE for nausea, abdominal pain, heartburn, or change in bowel habits  :  Negative   MUSCULOSKELETAL:  See HPI above  NEURO:  See HPI above  ENDOCRINE:  NEGATIVE for temperature intolerance, skin/hair changes  HEME/ALLERGY/IMMUNE:  NEGATIVE for bleeding problems  PSYCHIATRIC:  NEGATIVE for changes in mood or affect    EXAM:  GENERAL APPEARANCE: healthy, alert and no distress   GAIT: NORMAL  PSYCH:  mentation appears normal and affect normal/bright  SKIN: no suspicious lesions or rashes  NEURO: reflexes normal in upper  extremities.   Vascular: Good capp refill and pulses  RESP: No increased work of breathing  LYMPH:  No lymphedema    MSK/Neuro:   strength: normal,   positive  thenar fasciculations right only  Thenar atrophy:none.   Sensation diminished in  radial 3 digits,     Range of Motion wrist, digits: All Normal  Special tests: Tinel's positive, Phalen's positive.    EMG: none    ASSESSMENT/PLAN  Possible bilateral Carpal tunnel syndrome     We talked about the options, which included  EMG, activity modification, night splinting, therapy, corticosteroid injection, medrol dose pack, and carpal tunnel release.      We decided to proceed with RIGHT Carpal tunnel release, since the symptoms are classic, and responded to bracing initially. She plans on continuing repetitive work. Consider the left later on.  We discussed the procedure of open carpal tunnel release. We discussed the risks, which include but are not limited to incisional tenderness/pillar pain, infection, minor or major neurovascular injury, tendon laceration, finger stiffness and failure to relieve symptoms.  We also discussed the alternatives, including nonsurgical options, and alternatives surgical options such as minimally invasive carpal tunnel release.  We discussed the pros and cons of open carpal tunnel release versus minimally invasive carpal tunnel release.        PRICILA Huerta MD  Dept. Orthopedic Surgery  [facility]

## 2021-07-20 ENCOUNTER — OFFICE VISIT (OUTPATIENT)
Dept: ORTHOPEDICS | Facility: CLINIC | Age: 26
End: 2021-07-20
Payer: COMMERCIAL

## 2021-07-20 VITALS
OXYGEN SATURATION: 100 % | HEIGHT: 61 IN | HEART RATE: 85 BPM | SYSTOLIC BLOOD PRESSURE: 114 MMHG | DIASTOLIC BLOOD PRESSURE: 69 MMHG | BODY MASS INDEX: 39.84 KG/M2 | WEIGHT: 211 LBS

## 2021-07-20 DIAGNOSIS — G56.03 BILATERAL CARPAL TUNNEL SYNDROME: Primary | ICD-10-CM

## 2021-07-20 PROCEDURE — 99244 OFF/OP CNSLTJ NEW/EST MOD 40: CPT | Performed by: ORTHOPAEDIC SURGERY

## 2021-07-20 ASSESSMENT — MIFFLIN-ST. JEOR: SCORE: 1649.47

## 2021-07-20 ASSESSMENT — PAIN SCALES - GENERAL: PAINLEVEL: MODERATE PAIN (5)

## 2021-07-20 NOTE — LETTER
2021         RE: Saundra Urrutia  [address] [facility] [id number]        Dear Colleague,    Thank you for referring your patient, Saundra Urrutia, to the [facility]. Please see a copy of my visit note below.    SUBJECTIVE:  Saundra Urrutia is a 25 year old female who is seen in consultation at the request of Dr. Dumont, for Bilateral hand problems x 3 years  Symptoms:  Constant numbness in the tips of the first two fingers of the right hand  Wearing braces at night - no help now, but helped.  Any grasping makes her whole hand go numb .      Other symptoms:  Right slightly worse than left  Radial 3 digits   Hands feel weak.  Her job activities have become slower.  No dropping things  Trouble identifying things by touch.  .   Treatment: wrist braces, nsaids.    Job description:     Past Medical History:   Diagnosis Date     Anxiety      Depression      Depressive disorder     taking meds     Self-injurious behavior 2013    Superficial cutting       Past Surgical History:   Procedure Laterality Date      SECTION N/A 2019    Procedure:  SECTION;  Surgeon: Eleanor Gramajo MD;  Location: [facility] OR     NO HISTORY OF SURGERY          REVIEW OF SYSTEMS:  CONSTITUTIONAL:  NEGATIVE for fever, chills, change in weight  INTEGUMENTARY/SKIN:  NEGATIVE for worrisome rashes, moles or lesions  EYES:  NEGATIVE for vision changes or irritation  ENT/MOUTH:  NEGATIVE for ear, mouth and throat problems  RESP:  NEGATIVE for significant cough or SOB  BREAST:  NEGATIVE for masses, tenderness or discharge  CV:  NEGATIVE for chest pain, palpitations or peripheral edema  GI:  NEGATIVE for nausea, abdominal pain, heartburn, or change in bowel habits  :  Negative   MUSCULOSKELETAL:  See HPI above  NEURO:  See HPI above  ENDOCRINE:  NEGATIVE for temperature intolerance, skin/hair changes  HEME/ALLERGY/IMMUNE:  NEGATIVE for bleeding problems  PSYCHIATRIC:  NEGATIVE  for changes in mood or affect    EXAM:  GENERAL APPEARANCE: healthy, alert and no distress   GAIT: NORMAL  PSYCH:  mentation appears normal and affect normal/bright  SKIN: no suspicious lesions or rashes  NEURO: reflexes normal in upper extremities.   Vascular: Good capp refill and pulses  RESP: No increased work of breathing  LYMPH:  No lymphedema    MSK/Neuro:   strength: normal,   positive  thenar fasciculations right only  Thenar atrophy:none.   Sensation diminished in  radial 3 digits,     Range of Motion wrist, digits: All Normal  Special tests: Tinel's positive, Phalen's positive.    EMG: none    ASSESSMENT/PLAN  Possible bilateral Carpal tunnel syndrome     We talked about the options, which included  EMG, activity modification, night splinting, therapy, corticosteroid injection, medrol dose pack, and carpal tunnel release.      We decided to proceed with RIGHT Carpal tunnel release, since the symptoms are classic, and responded to bracing initially. She plans on continuing repetitive work. Consider the left later on.  We discussed the procedure of open carpal tunnel release. We discussed the risks, which include but are not limited to incisional tenderness/pillar pain, infection, minor or major neurovascular injury, tendon laceration, finger stiffness and failure to relieve symptoms.  We also discussed the alternatives, including nonsurgical options, and alternatives surgical options such as minimally invasive carpal tunnel release.  We discussed the pros and cons of open carpal tunnel release versus minimally invasive carpal tunnel release.        PRICILA Huerta MD  Dept. Orthopedic Surgery  [facility]      Again, thank you for allowing me to participate in the care of your patient.        Sincerely,        Tyler Huerta MD

## 2021-07-20 NOTE — PATIENT INSTRUCTIONS
Patient Education     Carpal Tunnel Release Surgery  Surgery may be done if your carpal tunnel syndrome (CTS) symptoms become severe. Or you may have surgery if no other treatment eases your symptoms. There are 2 types of CTS surgical procedures. You will be told about the one you will have. You ll also be instructed on how to prepare for it.      The goals of surgery  Two types of surgery are used to treat CTS: open and endoscopic.    Open surgery. With open surgery, your surgeon makes 1 cut (incision) in your palm. Standard surgical tools are used.    Endoscopic surgery. For this surgery, 1 or 2 small incisions are made in your hand or wrist. A thin tube with a very small camera attached (endoscope) is inserted under the carpal ligament. Tiny tools are inserted there as well. The surgeon then operates while watching images on a video screen. Each procedure has the same goal: Your surgeon will relieve pressure on the median nerve. To do this, the transverse carpal ligament is cut (released).  After surgery  If you ve had carpal tunnel surgery, you will spend a few hours resting before you go home. The nerve sensation and blood circulation in your hand will be checked at this time. For the safest healing, do the following:    Keep your hand raised above heart level. This will help reduce swelling.    Limit hand and wrist use as instructed. You may need a wrist brace.    Take any pain medicine as directed.    Do hand exercises as directed by your surgeon or therapist.  When to call the surgeon  Call your surgeon if you notice any of the following:    White or pale-blue hand or nails (you pinch your skin or nail and the color doesn t return)    Pain that is not relieved by prescribed medicine    Loss of sensation or excess swelling in hand or fingers    Pus-like liquid oozing out of your wound    Fever over 100.4 F (38 C)  Carlie last reviewed this educational content on 1/1/2018 2000-2021 [employer]. All rights reserved. This information is not intended as a substitute for professional medical care. Always follow your healthcare professional's instructions.

## 2021-07-21 ENCOUNTER — PREP FOR PROCEDURE (OUTPATIENT)
Dept: ORTHOPEDICS | Facility: CLINIC | Age: 26
End: 2021-07-21

## 2021-07-21 DIAGNOSIS — G56.01 CARPAL TUNNEL SYNDROME OF RIGHT WRIST: Primary | ICD-10-CM

## 2021-07-22 ENCOUNTER — TELEPHONE (OUTPATIENT)
Dept: ORTHOPEDICS | Facility: CLINIC | Age: 26
End: 2021-07-22

## 2021-08-06 ENCOUNTER — IMMUNIZATION (OUTPATIENT)
Dept: FAMILY MEDICINE | Facility: CLINIC | Age: 26
End: 2021-08-06
Attending: FAMILY MEDICINE
Payer: COMMERCIAL

## 2021-08-06 DIAGNOSIS — Z23 HIGH PRIORITY FOR 2019-NCOV VACCINE: ICD-10-CM

## 2021-08-06 PROCEDURE — 0012A PR COVID VAC MODERNA 100 MCG/0.5 ML IM: CPT

## 2021-08-06 PROCEDURE — 91301 PR COVID VAC MODERNA 100 MCG/0.5 ML IM: CPT

## 2021-10-13 ENCOUNTER — NURSE TRIAGE (OUTPATIENT)
Dept: NURSING | Facility: CLINIC | Age: 26
End: 2021-10-13

## 2021-10-13 NOTE — TELEPHONE ENCOUNTER
"  TRIAGE CALL     Patient calling \"Ear infection\"  She has had them before last one was about a year ago - She would like some drop to be prescribed.    Reports  Ear right side  No drainage  Swollen and Stiff  NO Temp  Pain level 5/10      Medications taken today Tylenol     Patient denies discharged from affected ear, fever, nausea, vomiting.    Patient able to drive and go to work     Per protocol, disposition to Walk-in clinic to be evaluated     Care advise given, patients questions were answered  Reminded we will be here 24/7 and can call back if symptoms worsen     Reason for Disposition    [1] SEVERE pain AND [2] not improved 2 hours after taking analgesic medication (e.g., ibuprofen or acetaminophen)    Additional Information    Negative: Fever > 104 F (40 C)    Negative: Patient sounds very sick or weak to the triager    Negative: Moving the earlobe or touching the ear clearly increases the pain    Negative: Foreign body struck in the ear (e.g., bug, piece of cotton)    Negative: Followed an ear injury    Negative: [1] Recently diagnosed with otitis media AND [2] currently on oral antibiotics    Negative: [1] Stiff neck (unable to touch chin to chest) AND [2] fever    Negative: [1] Bony area of skull behind the ear is pink or swollen AND [2] fever    Protocols used: EARACHE-A-AH      "

## 2021-10-19 PROBLEM — F32.9 MAJOR DEPRESSION: Status: RESOLVED | Noted: 2017-09-08 | Resolved: 2018-11-02

## 2022-09-11 DIAGNOSIS — F41.1 GAD (GENERALIZED ANXIETY DISORDER): ICD-10-CM

## 2022-09-11 DIAGNOSIS — F33.1 MAJOR DEPRESSIVE DISORDER, RECURRENT EPISODE, MODERATE (H): ICD-10-CM

## 2022-09-12 RX ORDER — SERTRALINE HYDROCHLORIDE 100 MG/1
TABLET, FILM COATED ORAL
Qty: 30 TABLET | Refills: 0 | Status: SHIPPED | OUTPATIENT
Start: 2022-09-12 | End: 2022-10-21

## 2022-09-12 NOTE — TELEPHONE ENCOUNTER
Medication is being filled for 1 time refill only due to:  Patient needs to be seen because it has been more than one year since last visit.   Demetrio Gale RN

## 2022-10-22 ENCOUNTER — HEALTH MAINTENANCE LETTER (OUTPATIENT)
Age: 27
End: 2022-10-22

## 2023-02-03 ENCOUNTER — VIRTUAL VISIT (OUTPATIENT)
Dept: INTERNAL MEDICINE | Facility: CLINIC | Age: 28
End: 2023-02-03
Payer: COMMERCIAL

## 2023-02-03 DIAGNOSIS — F41.1 GAD (GENERALIZED ANXIETY DISORDER): ICD-10-CM

## 2023-02-03 DIAGNOSIS — F33.1 MAJOR DEPRESSIVE DISORDER, RECURRENT EPISODE, MODERATE (H): Primary | ICD-10-CM

## 2023-02-03 PROCEDURE — 99214 OFFICE O/P EST MOD 30 MIN: CPT | Mod: GT

## 2023-02-03 RX ORDER — SERTRALINE HYDROCHLORIDE 100 MG/1
100 TABLET, FILM COATED ORAL DAILY
Qty: 90 TABLET | Refills: 0 | Status: SHIPPED | OUTPATIENT
Start: 2023-02-03 | End: 2023-04-13

## 2023-02-03 ASSESSMENT — PATIENT HEALTH QUESTIONNAIRE - PHQ9
SUM OF ALL RESPONSES TO PHQ QUESTIONS 1-9: 0
SUM OF ALL RESPONSES TO PHQ QUESTIONS 1-9: 0
10. IF YOU CHECKED OFF ANY PROBLEMS, HOW DIFFICULT HAVE THESE PROBLEMS MADE IT FOR YOU TO DO YOUR WORK, TAKE CARE OF THINGS AT HOME, OR GET ALONG WITH OTHER PEOPLE: NOT DIFFICULT AT ALL

## 2023-02-03 NOTE — PROGRESS NOTES
Saundra is a 27 year old who is being evaluated via a billable video visit.      How would you like to obtain your AVS? MyChart  If the video visit is dropped, the invitation should be resent by: Send to e-mail at: [email or web address]  Will anyone else be joining your video visit? No        Assessment & Plan     (F33.1) Major depressive disorder, recurrent episode, moderate (H)  (primary encounter diagnosis)  Comment: She has been on Zoloft 100MG for depression/anxiey for about 2-3 years. She reports her symptoms are very well controlled with current dosing. Her PHQ-9 score today is 0. She has not been seen in person since July of 2021. She is aware she is overdue for physical with her PCP. I will provide her with a 3 month prescription with zero refills and have advised her to make an appt with her PCP ASAP.   Plan: sertraline (ZOLOFT) 100 MG tablet           (F41.1) ALTAGRACIA (generalized anxiety disorder)  Plan: sertraline (ZOLOFT) 100 MG tablet               No follow-ups on file.    ALBA [facility]   Saundra is a 27 year old, presenting for the following health issues:  No chief complaint on file.      History of Present Illness       Reason for visit:  Refill prescription    She eats 2-3 servings of fruits and vegetables daily.She consumes 2 sweetened beverage(s) daily.She exercises with enough effort to increase her heart rate 9 or less minutes per day.  She exercises with enough effort to increase her heart rate 3 or less days per week. She is missing 1 dose(s) of medications per week.  She is not taking prescribed medications regularly due to remembering to take.    Today's PHQ-9         PHQ-9 Total Score: 0    PHQ-9 Q9 Thoughts of better off dead/self-harm past 2 weeks :   Not at all    How difficult have these problems made it for you to do your work, take care of things at home, or get along with other people: Not difficult at all       Depression  and Anxiety Follow-Up    How are you doing with your depression since your last visit? Improved     How are you doing with your anxiety since your last visit?  Improved     Are you having other symptoms that might be associated with depression or anxiety? No    Have you had a significant life event? No     Do you have any concerns with your use of alcohol or other drugs? No    Social History     Tobacco Use     Smoking status: Former     Packs/day: 0.01     Types: Cigarettes     Smokeless tobacco: Never     Tobacco comments:     uses E-cig   Vaping Use     Vaping Use: Every day     Substances: Nicotine, Flavoring     Devices: Pre-filled or refillable cartridge, Refillable tank     Passive vaping exposure: Yes   Substance Use Topics     Alcohol use: Not Currently     Comment: rare-quit with pregnancy     Drug use: Yes     Types: Marijuana     Comment: last use 12/10/18     PHQ 2/19/2021 7/9/2021 2/3/2023   PHQ-9 Total Score 2 0 0   Q9: Thoughts of better off dead/self-harm past 2 weeks Not at all Not at all Not at all     ALTAGRACIA-7 SCORE 12/11/2020 2/19/2021 7/9/2021   Total Score - - -   Total Score - - -   Total Score 3 0 0     Last PHQ-9 2/3/2023   1.  Little interest or pleasure in doing things 0   2.  Feeling down, depressed, or hopeless 0   3.  Trouble falling or staying asleep, or sleeping too much 0   4.  Feeling tired or having little energy 0   5.  Poor appetite or overeating 0   6.  Feeling bad about yourself 0   7.  Trouble concentrating 0   8.  Moving slowly or restless 0   Q9: Thoughts of better off dead/self-harm past 2 weeks 0   PHQ-9 Total Score 0   Difficulty at work, home, or with people -     ALTAGRACIA-7  7/9/2021   1. Feeling nervous, anxious, or on edge 0   2. Not being able to stop or control worrying 0   3. Worrying too much about different things 0   4. Trouble relaxing 0   5. Being so restless that it is hard to sit still 0   6. Becoming easily annoyed or irritable 0   7. Feeling afraid, as if  something awful might happen 0   ALTAGRACIA-7 Total Score 0   If you checked any problems, how difficult have they made it for you to do your work, take care of things at home, or get along with other people? -     Suicide Assessment Five-step Evaluation and Treatment (SAFE-T)          Objective           Vitals:  No vitals were obtained today due to virtual visit.    Physical Exam   GENERAL: Healthy, alert and no distress  EYES: Eyes grossly normal to inspection.  No discharge or erythema, or obvious scleral/conjunctival abnormalities.  RESP: No audible wheeze, cough, or visible cyanosis.  No visible retractions or increased work of breathing.    SKIN: Visible skin clear. No significant rash, abnormal pigmentation or lesions.  NEURO: Cranial nerves grossly intact.  Mentation and speech appropriate for age.  PSYCH: Mentation appears normal, affect normal/bright, judgement and insight intact, normal speech and appearance well-groomed.          Video-Visit Details    Type of service:  Video Visit   Video Start Time: 11:43 AM  Video End Time:11:47 AM    Originating Location (pt. Location): Home    Distant Location (provider location):  On-site  Platform used for Video Visit: Portillo

## 2023-04-13 ENCOUNTER — OFFICE VISIT (OUTPATIENT)
Dept: FAMILY MEDICINE | Facility: CLINIC | Age: 28
End: 2023-04-13
Payer: COMMERCIAL

## 2023-04-13 VITALS
HEART RATE: 79 BPM | DIASTOLIC BLOOD PRESSURE: 74 MMHG | TEMPERATURE: 98.1 F | OXYGEN SATURATION: 98 % | WEIGHT: 265 LBS | RESPIRATION RATE: 16 BRPM | SYSTOLIC BLOOD PRESSURE: 116 MMHG | BODY MASS INDEX: 48.76 KG/M2 | HEIGHT: 62 IN

## 2023-04-13 DIAGNOSIS — E66.01 MORBID OBESITY (H): ICD-10-CM

## 2023-04-13 DIAGNOSIS — F33.1 MAJOR DEPRESSIVE DISORDER, RECURRENT EPISODE, MODERATE (H): ICD-10-CM

## 2023-04-13 DIAGNOSIS — G56.03 BILATERAL CARPAL TUNNEL SYNDROME: ICD-10-CM

## 2023-04-13 DIAGNOSIS — Z13.1 SCREENING FOR DIABETES MELLITUS: ICD-10-CM

## 2023-04-13 DIAGNOSIS — Z13.220 LIPID SCREENING: ICD-10-CM

## 2023-04-13 DIAGNOSIS — F41.1 GAD (GENERALIZED ANXIETY DISORDER): ICD-10-CM

## 2023-04-13 DIAGNOSIS — Z11.59 NEED FOR HEPATITIS C SCREENING TEST: ICD-10-CM

## 2023-04-13 DIAGNOSIS — Z11.3 ROUTINE SCREENING FOR STI (SEXUALLY TRANSMITTED INFECTION): ICD-10-CM

## 2023-04-13 DIAGNOSIS — Z00.01 ENCOUNTER FOR GENERAL ADULT MEDICAL EXAMINATION WITH ABNORMAL FINDINGS: Primary | ICD-10-CM

## 2023-04-13 DIAGNOSIS — Z12.4 CERVICAL CANCER SCREENING: ICD-10-CM

## 2023-04-13 PROCEDURE — 99395 PREV VISIT EST AGE 18-39: CPT | Mod: 25 | Performed by: FAMILY MEDICINE

## 2023-04-13 PROCEDURE — 91312 COVID-19 VACCINE BIVALENT BOOSTER 12+ (PFIZER): CPT | Performed by: FAMILY MEDICINE

## 2023-04-13 PROCEDURE — G0145 SCR C/V CYTO,THINLAYER,RESCR: HCPCS | Performed by: FAMILY MEDICINE

## 2023-04-13 PROCEDURE — 87491 CHLMYD TRACH DNA AMP PROBE: CPT | Performed by: FAMILY MEDICINE

## 2023-04-13 PROCEDURE — G0124 SCREEN C/V THIN LAYER BY MD: HCPCS | Performed by: PATHOLOGY

## 2023-04-13 PROCEDURE — 87624 HPV HI-RISK TYP POOLED RSLT: CPT | Performed by: FAMILY MEDICINE

## 2023-04-13 PROCEDURE — 87591 N.GONORRHOEAE DNA AMP PROB: CPT | Performed by: FAMILY MEDICINE

## 2023-04-13 PROCEDURE — 0124A COVID-19 VACCINE BIVALENT BOOSTER 12+ (PFIZER): CPT | Performed by: FAMILY MEDICINE

## 2023-04-13 RX ORDER — SERTRALINE HYDROCHLORIDE 100 MG/1
100 TABLET, FILM COATED ORAL DAILY
Qty: 90 TABLET | Refills: 3 | Status: SHIPPED | OUTPATIENT
Start: 2023-04-13 | End: 2024-07-02

## 2023-04-13 ASSESSMENT — ANXIETY QUESTIONNAIRES
5. BEING SO RESTLESS THAT IT IS HARD TO SIT STILL: NOT AT ALL
7. FEELING AFRAID AS IF SOMETHING AWFUL MIGHT HAPPEN: NOT AT ALL
GAD7 TOTAL SCORE: 0
6. BECOMING EASILY ANNOYED OR IRRITABLE: NOT AT ALL
3. WORRYING TOO MUCH ABOUT DIFFERENT THINGS: NOT AT ALL
1. FEELING NERVOUS, ANXIOUS, OR ON EDGE: NOT AT ALL
IF YOU CHECKED OFF ANY PROBLEMS ON THIS QUESTIONNAIRE, HOW DIFFICULT HAVE THESE PROBLEMS MADE IT FOR YOU TO DO YOUR WORK, TAKE CARE OF THINGS AT HOME, OR GET ALONG WITH OTHER PEOPLE: NOT DIFFICULT AT ALL
2. NOT BEING ABLE TO STOP OR CONTROL WORRYING: NOT AT ALL
GAD7 TOTAL SCORE: 0

## 2023-04-13 ASSESSMENT — PATIENT HEALTH QUESTIONNAIRE - PHQ9
SUM OF ALL RESPONSES TO PHQ QUESTIONS 1-9: 0
5. POOR APPETITE OR OVEREATING: NOT AT ALL

## 2023-04-13 NOTE — PROGRESS NOTES
"   SUBJECTIVE:   CC: Saundra is an 27 year old who presents for preventive health visit.        View : No data to display.            Patient has been advised of split billing requirements and indicates understanding: Yes  Healthy Habits:    Getting at least 3 servings of Calcium per day:  Yes    Bi-annual eye exam:  Yes    Dental care twice a year:  NO    Sleep apnea or symptoms of sleep apnea:  None    Diet:  Regular (no restrictions)    Frequency of exercise:  None    Duration of exercise:  N/A    Taking medications regularly:  Yes    Barriers to taking medications:  None    Medication side effects:  None    PHQ-2 Total Score:    Additional concerns today:  Yes    Carpal tunnel in both wrists   did see ortho -  Dr. Huerta - they did not click well and she would like to have referral for new ortho.  Hands are going numb, sometimes losing feeling in the right hand/edita first three fingers       Would like to have STD testing done today    Needing refill on Zoloft, things are going really well on current dose  Stable, feeling \"fabulous\"    vapes - nicotine - not interested in quitting      Today's PHQ-2 Score:       7/9/2021    11:37 AM   PHQ-2 ( 1999 [employer])   Q1: Little interest or pleasure in doing things 0   Q2: Feeling down, depressed or hopeless 0   PHQ-2 Score 0   PHQ-2 Total Score (12-17 Years)- Positive if 3 or more points; Administer PHQ-A if positive 0   Q1: Little interest or pleasure in doing things Not at all   Q2: Feeling down, depressed or hopeless Not at all   PHQ-2 Score 0           Social History     Tobacco Use     Smoking status: Former     Packs/day: 0.01     Types: Cigarettes     Smokeless tobacco: Never     Tobacco comments:     uses E-cig   Vaping Use     Vaping status: Every Day     Substances: Nicotine, Flavoring     Devices: Pre-filled or refillable cartridge, Refillable tank     Passive vaping exposure: Yes   Substance Use Topics     Alcohol use: Not Currently     Comment: rare-quit with " "pregnancy             2021    11:37 AM   Alcohol Use   Prescreen: >3 drinks/day or >7 drinks/week? No     Reviewed orders with patient.  Reviewed health maintenance and updated orders accordingly - Yes  Labs reviewed in EPIC    Breast Cancer Screenin/13/2023    10:41 AM   Breast CA Risk Assessment (FHS-7)   Do you have a family history of breast, colon, or ovarian cancer? No / Unknown       Pertinent mammograms are reviewed under the imaging tab.    History of abnormal Pap smear:       2019     1:11 PM   PAP / HPV   PAP (Historical) NIL       Reviewed and updated as needed this visit by clinical staff                  Reviewed and updated as needed this visit by Provider                     Review of Systems  CONSTITUTIONAL: NEGATIVE for fever, chills, change in weight  INTEGUMENTARU/SKIN: NEGATIVE for worrisome rashes, moles or lesions  EYES: NEGATIVE for vision changes or irritation  ENT: NEGATIVE for ear, mouth and throat problems  RESP: NEGATIVE for significant cough or SOB  BREAST: NEGATIVE for masses, tenderness or discharge  CV: NEGATIVE for chest pain, palpitations or peripheral edema  GI: NEGATIVE for nausea, abdominal pain, heartburn, or change in bowel habits  : NEGATIVE for unusual urinary or vaginal symptoms. Periods are regular.  MUSCULOSKELETAL: NEGATIVE for significant arthralgias or myalgia  NEURO: NEGATIVE for weakness, dizziness or paresthesias  PSYCHIATRIC: NEGATIVE for changes in mood or affect     OBJECTIVE:   /74   Pulse 79   Temp 98.1  F (36.7  C) (Tympanic)   Resp 16   Ht 1.57 m (5' 1.81\")   Wt 120.2 kg (265 lb)   SpO2 98%   BMI 48.77 kg/m    Physical Exam  GENERAL: healthy, alert and no distress  EYES: Eyes grossly normal to inspection, PERRL and conjunctivae and sclerae normal  HENT: ear canals and TM's normal, nose and mouth without ulcers or lesions  NECK: no adenopathy, no asymmetry, masses, or scars and thyroid normal to palpation  RESP: lungs clear " to auscultation - no rales, rhonchi or wheezes  BREAST: normal without masses, tenderness or nipple discharge and no palpable axillary masses or adenopathy  CV: regular rate and rhythm, normal S1 S2, no S3 or S4, no murmur, click or rub, no peripheral edema and peripheral pulses strong  ABDOMEN: soft, nontender, no hepatosplenomegaly, no masses and bowel sounds normal   (female): normal female external genitalia, normal urethral meatus, vaginal mucosa pink, moist, well rugated, and normal cervix/adnexa/uterus without masses or discharge  MS: no gross musculoskeletal defects noted, no edema  SKIN: no suspicious lesions or rashes  NEURO: Normal strength and tone, mentation intact and speech normal  PSYCH: mentation appears normal, affect normal/bright    Diagnostic Test Results:  Labs reviewed in Epic    ASSESSMENT/PLAN:       (Z00.01) Encounter for general adult medical examination with abnormal findings  (primary encounter diagnosis)  Comment: We discussed  exercise 30mins/day, and calcium with vitamin D at 1200mg/day, preferably from dietary sources.        (Z11.59) Need for hepatitis C screening test  Comment: discussed   Plan: Hepatitis C Screen Reflex to HCV RNA Quant and         Genotype,             (Z12.4) Cervical cancer screening  Comment: discussed   Plan: Pap Screen reflex to HPV if ASCUS - recommend         age 25 - 29            (F33.1) Major depressive disorder, recurrent episode, moderate (H)  Comment: discussed and doing great on the zoloft.   Plan: sertraline (ZOLOFT) 100 MG tablet            (F41.1) ALTAGRACIA (generalized anxiety disorder)  Comment: discussed and doing great on the zoloft   Plan: sertraline (ZOLOFT) 100 MG tablet            (G56.03) Bilateral carpal tunnel syndrome  Comment: discussed and referral made  Plan: Orthopedic  Referral            (Z11.3) Routine screening for STI (sexually transmitted infection)  Comment: discussed and ordered   Plan: NEISSERIA GONORRHOEA PCR,  CHLAMYDIA TRACHOMATIS        PCR, HIV Antigen Antibody Combo, Treponema Abs         w Reflex to RPR and Titer, HIV Antigen Antibody        Combo, Treponema Abs w Reflex to RPR and Titer            (Z13.220) Lipid screening  Comment: discussed   Plan: Lipid panel reflex to direct LDL Fasting            (Z13.1) Screening for diabetes mellitus  Comment: discussed   Plan: Glucose, Glucose            (E66.01) Morbid obesity (H)  Comment: she is working on getting daily exercise   Plan:           Patient has been advised of split billing requirements and indicates understanding: Yes      COUNSELING:  Reviewed preventive health counseling, as reflected in patient instructions       Regular exercise       Healthy diet/nutrition        She reports that she has quit smoking. Her smoking use included cigarettes. She smoked an average of .01 packs per day. She has never used smokeless tobacco.      Karla Dumont MD  [facility] for HPI/ROS submitted by the patient on 4/13/2023  What is the reason for your visit today? : Physical  How many servings of fruits and vegetables do you eat daily?: 2-3  On average, how many sweetened beverages do you drink each day (Examples: soda, juice, sweet tea, etc.  Do NOT count diet or artificially sweetened beverages)?: 2  How many minutes a day do you exercise enough to make your heart beat faster?: 10 to 19  How many days a week do you exercise enough to make your heart beat faster?: 4  How many days per week do you miss taking your medication?: 0

## 2023-04-14 ENCOUNTER — LAB (OUTPATIENT)
Dept: LAB | Facility: CLINIC | Age: 28
End: 2023-04-14
Payer: COMMERCIAL

## 2023-04-14 DIAGNOSIS — Z13.1 SCREENING FOR DIABETES MELLITUS: ICD-10-CM

## 2023-04-14 DIAGNOSIS — Z11.3 ROUTINE SCREENING FOR STI (SEXUALLY TRANSMITTED INFECTION): ICD-10-CM

## 2023-04-14 DIAGNOSIS — Z11.59 NEED FOR HEPATITIS C SCREENING TEST: ICD-10-CM

## 2023-04-14 DIAGNOSIS — Z13.220 LIPID SCREENING: ICD-10-CM

## 2023-04-14 LAB
C TRACH DNA SPEC QL NAA+PROBE: NEGATIVE
CHOLEST SERPL-MCNC: 197 MG/DL
FASTING STATUS PATIENT QL REPORTED: YES
GLUCOSE SERPL-MCNC: 131 MG/DL (ref 70–99)
HDLC SERPL-MCNC: 59 MG/DL
LDLC SERPL CALC-MCNC: 114 MG/DL
N GONORRHOEA DNA SPEC QL NAA+PROBE: NEGATIVE
NONHDLC SERPL-MCNC: 138 MG/DL
TRIGL SERPL-MCNC: 119 MG/DL

## 2023-04-14 PROCEDURE — 86803 HEPATITIS C AB TEST: CPT

## 2023-04-14 PROCEDURE — 82947 ASSAY GLUCOSE BLOOD QUANT: CPT

## 2023-04-14 PROCEDURE — 87389 HIV-1 AG W/HIV-1&-2 AB AG IA: CPT

## 2023-04-14 PROCEDURE — 80061 LIPID PANEL: CPT

## 2023-04-14 PROCEDURE — 36415 COLL VENOUS BLD VENIPUNCTURE: CPT

## 2023-04-14 PROCEDURE — 86780 TREPONEMA PALLIDUM: CPT

## 2023-04-15 LAB — T PALLIDUM AB SER QL: NONREACTIVE

## 2023-04-17 LAB
HCV AB SERPL QL IA: NONREACTIVE
HIV 1+2 AB+HIV1 P24 AG SERPL QL IA: NONREACTIVE

## 2023-04-18 LAB
BKR LAB AP GYN ADEQUACY: ABNORMAL
BKR LAB AP GYN INTERPRETATION: ABNORMAL
BKR LAB AP HPV REFLEX: ABNORMAL
BKR LAB AP PREVIOUS ABNORMAL: ABNORMAL
PATH REPORT.COMMENTS IMP SPEC: ABNORMAL
PATH REPORT.COMMENTS IMP SPEC: ABNORMAL
PATH REPORT.RELEVANT HX SPEC: ABNORMAL

## 2023-04-20 PROBLEM — R87.610 ASCUS OF CERVIX WITH NEGATIVE HIGH RISK HPV: Status: ACTIVE | Noted: 2023-04-13

## 2023-04-20 LAB
HUMAN PAPILLOMA VIRUS 16 DNA: NEGATIVE
HUMAN PAPILLOMA VIRUS 18 DNA: NEGATIVE
HUMAN PAPILLOMA VIRUS FINAL DIAGNOSIS: NORMAL
HUMAN PAPILLOMA VIRUS OTHER HR: NEGATIVE

## 2023-05-11 ENCOUNTER — APPOINTMENT (OUTPATIENT)
Dept: CT IMAGING | Facility: HOSPITAL | Age: 28
End: 2023-05-11
Attending: EMERGENCY MEDICINE
Payer: COMMERCIAL

## 2023-05-11 ENCOUNTER — ANESTHESIA (OUTPATIENT)
Dept: SURGERY | Facility: HOSPITAL | Age: 28
End: 2023-05-11
Payer: COMMERCIAL

## 2023-05-11 ENCOUNTER — ANESTHESIA EVENT (OUTPATIENT)
Dept: SURGERY | Facility: HOSPITAL | Age: 28
End: 2023-05-11
Payer: COMMERCIAL

## 2023-05-11 ENCOUNTER — HOSPITAL ENCOUNTER (OUTPATIENT)
Facility: HOSPITAL | Age: 28
Discharge: HOME | End: 2023-05-11
Attending: EMERGENCY MEDICINE | Admitting: EMERGENCY MEDICINE
Payer: COMMERCIAL

## 2023-05-11 VITALS
OXYGEN SATURATION: 94 % | WEIGHT: 250 LBS | DIASTOLIC BLOOD PRESSURE: 64 MMHG | BODY MASS INDEX: 46.01 KG/M2 | SYSTOLIC BLOOD PRESSURE: 140 MMHG | HEART RATE: 108 BPM | HEIGHT: 62 IN | RESPIRATION RATE: 18 BRPM | TEMPERATURE: 98 F

## 2023-05-11 DIAGNOSIS — K81.0 ACUTE CHOLECYSTITIS: Primary | ICD-10-CM

## 2023-05-11 LAB
ALBUMIN SERPL BCG-MCNC: 4.4 G/DL (ref 3.5–5.2)
ALP SERPL-CCNC: 83 U/L (ref 35–104)
ALT SERPL W P-5'-P-CCNC: 53 U/L (ref 10–35)
ANION GAP SERPL CALCULATED.3IONS-SCNC: 13 MMOL/L (ref 7–15)
AST SERPL W P-5'-P-CCNC: 29 U/L (ref 10–35)
BASOPHILS # BLD AUTO: 0 10E3/UL (ref 0–0.2)
BASOPHILS NFR BLD AUTO: 0 %
BILIRUB DIRECT SERPL-MCNC: <0.2 MG/DL (ref 0–0.3)
BILIRUB SERPL-MCNC: 0.2 MG/DL
BUN SERPL-MCNC: 12.8 MG/DL (ref 6–20)
CALCIUM SERPL-MCNC: 9.7 MG/DL (ref 8.6–10)
CHLORIDE SERPL-SCNC: 105 MMOL/L (ref 98–107)
CREAT SERPL-MCNC: 0.82 MG/DL (ref 0.51–0.95)
DEPRECATED HCO3 PLAS-SCNC: 23 MMOL/L (ref 22–29)
EOSINOPHIL # BLD AUTO: 0.1 10E3/UL (ref 0–0.7)
EOSINOPHIL NFR BLD AUTO: 1 %
ERYTHROCYTE [DISTWIDTH] IN BLOOD BY AUTOMATED COUNT: 13.5 % (ref 10–15)
GFR SERPL CREATININE-BSD FRML MDRD: >90 ML/MIN/1.73M2
GLUCOSE SERPL-MCNC: 120 MG/DL (ref 70–99)
HCG UR QL: NEGATIVE
HCT VFR BLD AUTO: 40.3 % (ref 35–47)
HGB BLD-MCNC: 13.3 G/DL (ref 11.7–15.7)
IMM GRANULOCYTES # BLD: 0.1 10E3/UL
IMM GRANULOCYTES NFR BLD: 1 %
LIPASE SERPL-CCNC: 29 U/L (ref 13–60)
LYMPHOCYTES # BLD AUTO: 2.3 10E3/UL (ref 0.8–5.3)
LYMPHOCYTES NFR BLD AUTO: 20 %
MCH RBC QN AUTO: 30.6 PG (ref 26.5–33)
MCHC RBC AUTO-ENTMCNC: 33 G/DL (ref 31.5–36.5)
MCV RBC AUTO: 93 FL (ref 78–100)
MONOCYTES # BLD AUTO: 0.5 10E3/UL (ref 0–1.3)
MONOCYTES NFR BLD AUTO: 4 %
NEUTROPHILS # BLD AUTO: 8.4 10E3/UL (ref 1.6–8.3)
NEUTROPHILS NFR BLD AUTO: 74 %
NRBC # BLD AUTO: 0 10E3/UL
NRBC BLD AUTO-RTO: 0 /100
PLATELET # BLD AUTO: 397 10E3/UL (ref 150–450)
POTASSIUM SERPL-SCNC: 4.3 MMOL/L (ref 3.4–5.3)
PROT SERPL-MCNC: 7.8 G/DL (ref 6.4–8.3)
RBC # BLD AUTO: 4.35 10E6/UL (ref 3.8–5.2)
SODIUM SERPL-SCNC: 141 MMOL/L (ref 136–145)
WBC # BLD AUTO: 11.4 10E3/UL (ref 4–11)

## 2023-05-11 PROCEDURE — 250N000025 HC SEVOFLURANE, PER MIN: Performed by: SPECIALIST

## 2023-05-11 PROCEDURE — 250N000009 HC RX 250

## 2023-05-11 PROCEDURE — 88304 TISSUE EXAM BY PATHOLOGIST: CPT | Mod: TC | Performed by: SPECIALIST

## 2023-05-11 PROCEDURE — 74177 CT ABD & PELVIS W/CONTRAST: CPT

## 2023-05-11 PROCEDURE — 250N000011 HC RX IP 250 OP 636: Performed by: SPECIALIST

## 2023-05-11 PROCEDURE — 710N000012 HC RECOVERY PHASE 2, PER MINUTE: Performed by: SPECIALIST

## 2023-05-11 PROCEDURE — 96375 TX/PRO/DX INJ NEW DRUG ADDON: CPT

## 2023-05-11 PROCEDURE — 93005 ELECTROCARDIOGRAM TRACING: CPT | Mod: XU | Performed by: EMERGENCY MEDICINE

## 2023-05-11 PROCEDURE — 99254 IP/OBS CNSLTJ NEW/EST MOD 60: CPT | Mod: 57 | Performed by: SPECIALIST

## 2023-05-11 PROCEDURE — 99285 EMERGENCY DEPT VISIT HI MDM: CPT | Mod: 25

## 2023-05-11 PROCEDURE — 88304 TISSUE EXAM BY PATHOLOGIST: CPT | Mod: 26 | Performed by: PATHOLOGY

## 2023-05-11 PROCEDURE — 272N000001 HC OR GENERAL SUPPLY STERILE: Performed by: SPECIALIST

## 2023-05-11 PROCEDURE — 80053 COMPREHEN METABOLIC PANEL: CPT | Performed by: EMERGENCY MEDICINE

## 2023-05-11 PROCEDURE — 370N000017 HC ANESTHESIA TECHNICAL FEE, PER MIN: Performed by: SPECIALIST

## 2023-05-11 PROCEDURE — 82248 BILIRUBIN DIRECT: CPT | Performed by: EMERGENCY MEDICINE

## 2023-05-11 PROCEDURE — 36415 COLL VENOUS BLD VENIPUNCTURE: CPT | Performed by: EMERGENCY MEDICINE

## 2023-05-11 PROCEDURE — 258N000003 HC RX IP 258 OP 636: Performed by: SPECIALIST

## 2023-05-11 PROCEDURE — 250N000011 HC RX IP 250 OP 636

## 2023-05-11 PROCEDURE — 999N000141 HC STATISTIC PRE-PROCEDURE NURSING ASSESSMENT: Performed by: SPECIALIST

## 2023-05-11 PROCEDURE — 250N000013 HC RX MED GY IP 250 OP 250 PS 637: Performed by: EMERGENCY MEDICINE

## 2023-05-11 PROCEDURE — 710N000009 HC RECOVERY PHASE 1, LEVEL 1, PER MIN: Performed by: SPECIALIST

## 2023-05-11 PROCEDURE — 250N000011 HC RX IP 250 OP 636: Performed by: EMERGENCY MEDICINE

## 2023-05-11 PROCEDURE — 250N000009 HC RX 250: Performed by: SPECIALIST

## 2023-05-11 PROCEDURE — 360N000076 HC SURGERY LEVEL 3, PER MIN: Performed by: SPECIALIST

## 2023-05-11 PROCEDURE — 83690 ASSAY OF LIPASE: CPT | Performed by: EMERGENCY MEDICINE

## 2023-05-11 PROCEDURE — 96365 THER/PROPH/DIAG IV INF INIT: CPT | Mod: 59

## 2023-05-11 PROCEDURE — 81025 URINE PREGNANCY TEST: CPT | Performed by: EMERGENCY MEDICINE

## 2023-05-11 PROCEDURE — 250N000009 HC RX 250: Performed by: EMERGENCY MEDICINE

## 2023-05-11 PROCEDURE — 250N000013 HC RX MED GY IP 250 OP 250 PS 637

## 2023-05-11 PROCEDURE — 85025 COMPLETE CBC W/AUTO DIFF WBC: CPT | Performed by: EMERGENCY MEDICINE

## 2023-05-11 PROCEDURE — 47562 LAPAROSCOPIC CHOLECYSTECTOMY: CPT | Performed by: SPECIALIST

## 2023-05-11 PROCEDURE — 258N000003 HC RX IP 258 OP 636

## 2023-05-11 RX ORDER — FENTANYL CITRATE 50 UG/ML
INJECTION, SOLUTION INTRAMUSCULAR; INTRAVENOUS PRN
Status: DISCONTINUED | OUTPATIENT
Start: 2023-05-11 | End: 2023-05-11

## 2023-05-11 RX ORDER — ALBUTEROL SULFATE 90 UG/1
AEROSOL, METERED RESPIRATORY (INHALATION) PRN
Status: DISCONTINUED | OUTPATIENT
Start: 2023-05-11 | End: 2023-05-11

## 2023-05-11 RX ORDER — BUPIVACAINE HYDROCHLORIDE 2.5 MG/ML
INJECTION, SOLUTION INFILTRATION; PERINEURAL PRN
Status: DISCONTINUED | OUTPATIENT
Start: 2023-05-11 | End: 2023-05-11 | Stop reason: HOSPADM

## 2023-05-11 RX ORDER — FENTANYL CITRATE 50 UG/ML
50 INJECTION, SOLUTION INTRAMUSCULAR; INTRAVENOUS EVERY 5 MIN PRN
Status: DISCONTINUED | OUTPATIENT
Start: 2023-05-11 | End: 2023-05-11 | Stop reason: HOSPADM

## 2023-05-11 RX ORDER — LIDOCAINE HYDROCHLORIDE 10 MG/ML
INJECTION, SOLUTION INFILTRATION; PERINEURAL PRN
Status: DISCONTINUED | OUTPATIENT
Start: 2023-05-11 | End: 2023-05-11

## 2023-05-11 RX ORDER — ACETAMINOPHEN 325 MG/1
975 TABLET ORAL
Status: DISCONTINUED | OUTPATIENT
Start: 2023-05-11 | End: 2023-05-11 | Stop reason: HOSPADM

## 2023-05-11 RX ORDER — SODIUM CHLORIDE, SODIUM LACTATE, POTASSIUM CHLORIDE, CALCIUM CHLORIDE 600; 310; 30; 20 MG/100ML; MG/100ML; MG/100ML; MG/100ML
INJECTION, SOLUTION INTRAVENOUS CONTINUOUS
Status: DISCONTINUED | OUTPATIENT
Start: 2023-05-11 | End: 2023-05-11 | Stop reason: HOSPADM

## 2023-05-11 RX ORDER — CEFAZOLIN SODIUM/WATER 2 G/20 ML
2 SYRINGE (ML) INTRAVENOUS SEE ADMIN INSTRUCTIONS
Status: DISCONTINUED | OUTPATIENT
Start: 2023-05-11 | End: 2023-05-11 | Stop reason: HOSPADM

## 2023-05-11 RX ORDER — OXYCODONE HYDROCHLORIDE 5 MG/1
5 TABLET ORAL
Status: DISCONTINUED | OUTPATIENT
Start: 2023-05-11 | End: 2023-05-11 | Stop reason: HOSPADM

## 2023-05-11 RX ORDER — MEPERIDINE HYDROCHLORIDE 25 MG/ML
12.5 INJECTION INTRAMUSCULAR; INTRAVENOUS; SUBCUTANEOUS EVERY 5 MIN PRN
Status: DISCONTINUED | OUTPATIENT
Start: 2023-05-11 | End: 2023-05-11 | Stop reason: HOSPADM

## 2023-05-11 RX ORDER — PROPOFOL 10 MG/ML
INJECTION, EMULSION INTRAVENOUS CONTINUOUS PRN
Status: DISCONTINUED | OUTPATIENT
Start: 2023-05-11 | End: 2023-05-11

## 2023-05-11 RX ORDER — NALOXONE HYDROCHLORIDE 0.4 MG/ML
0.2 INJECTION, SOLUTION INTRAMUSCULAR; INTRAVENOUS; SUBCUTANEOUS
Status: DISCONTINUED | OUTPATIENT
Start: 2023-05-11 | End: 2023-05-11 | Stop reason: HOSPADM

## 2023-05-11 RX ORDER — KETOROLAC TROMETHAMINE 15 MG/ML
15 INJECTION, SOLUTION INTRAMUSCULAR; INTRAVENOUS ONCE
Status: COMPLETED | OUTPATIENT
Start: 2023-05-11 | End: 2023-05-11

## 2023-05-11 RX ORDER — DEXAMETHASONE SODIUM PHOSPHATE 10 MG/ML
INJECTION, SOLUTION INTRAMUSCULAR; INTRAVENOUS PRN
Status: DISCONTINUED | OUTPATIENT
Start: 2023-05-11 | End: 2023-05-11

## 2023-05-11 RX ORDER — NALOXONE HYDROCHLORIDE 0.4 MG/ML
0.4 INJECTION, SOLUTION INTRAMUSCULAR; INTRAVENOUS; SUBCUTANEOUS
Status: DISCONTINUED | OUTPATIENT
Start: 2023-05-11 | End: 2023-05-11 | Stop reason: HOSPADM

## 2023-05-11 RX ORDER — SODIUM CHLORIDE, SODIUM LACTATE, POTASSIUM CHLORIDE, AND CALCIUM CHLORIDE .6; .31; .03; .02 G/100ML; G/100ML; G/100ML; G/100ML
IRRIGANT IRRIGATION PRN
Status: DISCONTINUED | OUTPATIENT
Start: 2023-05-11 | End: 2023-05-11 | Stop reason: HOSPADM

## 2023-05-11 RX ORDER — HYDROCODONE BITARTRATE AND ACETAMINOPHEN 5; 325 MG/1; MG/1
1-2 TABLET ORAL EVERY 4 HOURS PRN
Qty: 20 TABLET | Refills: 0 | Status: SHIPPED | OUTPATIENT
Start: 2023-05-11 | End: 2023-05-22

## 2023-05-11 RX ORDER — FENTANYL CITRATE 50 UG/ML
25 INJECTION, SOLUTION INTRAMUSCULAR; INTRAVENOUS EVERY 5 MIN PRN
Status: DISCONTINUED | OUTPATIENT
Start: 2023-05-11 | End: 2023-05-11 | Stop reason: HOSPADM

## 2023-05-11 RX ORDER — ACETAMINOPHEN 325 MG/1
975 TABLET ORAL ONCE
Status: DISCONTINUED | OUTPATIENT
Start: 2023-05-11 | End: 2023-05-11 | Stop reason: HOSPADM

## 2023-05-11 RX ORDER — GLYCOPYRROLATE 0.2 MG/ML
INJECTION, SOLUTION INTRAMUSCULAR; INTRAVENOUS PRN
Status: DISCONTINUED | OUTPATIENT
Start: 2023-05-11 | End: 2023-05-11

## 2023-05-11 RX ORDER — HYDROCODONE BITARTRATE AND ACETAMINOPHEN 5; 325 MG/1; MG/1
1-2 TABLET ORAL EVERY 6 HOURS PRN
Qty: 18 TABLET | Refills: 0 | Status: SHIPPED | OUTPATIENT
Start: 2023-05-11 | End: 2023-05-14

## 2023-05-11 RX ORDER — CEFAZOLIN SODIUM/WATER 2 G/20 ML
2 SYRINGE (ML) INTRAVENOUS
Status: COMPLETED | OUTPATIENT
Start: 2023-05-11 | End: 2023-05-11

## 2023-05-11 RX ORDER — LIDOCAINE 40 MG/G
CREAM TOPICAL
Status: DISCONTINUED | OUTPATIENT
Start: 2023-05-11 | End: 2023-05-11 | Stop reason: HOSPADM

## 2023-05-11 RX ORDER — IBUPROFEN 200 MG
600 TABLET ORAL
Status: DISCONTINUED | OUTPATIENT
Start: 2023-05-11 | End: 2023-05-11 | Stop reason: HOSPADM

## 2023-05-11 RX ORDER — OXYCODONE HYDROCHLORIDE 5 MG/1
10 TABLET ORAL
Status: DISCONTINUED | OUTPATIENT
Start: 2023-05-11 | End: 2023-05-11 | Stop reason: HOSPADM

## 2023-05-11 RX ORDER — PIPERACILLIN SODIUM, TAZOBACTAM SODIUM 3; .375 G/15ML; G/15ML
3.38 INJECTION, POWDER, LYOPHILIZED, FOR SOLUTION INTRAVENOUS ONCE
Status: COMPLETED | OUTPATIENT
Start: 2023-05-11 | End: 2023-05-11

## 2023-05-11 RX ORDER — PROPOFOL 10 MG/ML
INJECTION, EMULSION INTRAVENOUS PRN
Status: DISCONTINUED | OUTPATIENT
Start: 2023-05-11 | End: 2023-05-11

## 2023-05-11 RX ORDER — HYDROCODONE BITARTRATE AND ACETAMINOPHEN 5; 325 MG/1; MG/1
1 TABLET ORAL
Status: DISCONTINUED | OUTPATIENT
Start: 2023-05-11 | End: 2023-05-11 | Stop reason: HOSPADM

## 2023-05-11 RX ORDER — ONDANSETRON 4 MG/1
4 TABLET, ORALLY DISINTEGRATING ORAL EVERY 30 MIN PRN
Status: DISCONTINUED | OUTPATIENT
Start: 2023-05-11 | End: 2023-05-11 | Stop reason: HOSPADM

## 2023-05-11 RX ORDER — MAGNESIUM HYDROXIDE 1200 MG/15ML
LIQUID ORAL PRN
Status: DISCONTINUED | OUTPATIENT
Start: 2023-05-11 | End: 2023-05-11 | Stop reason: HOSPADM

## 2023-05-11 RX ORDER — KETAMINE HYDROCHLORIDE 10 MG/ML
INJECTION INTRAMUSCULAR; INTRAVENOUS PRN
Status: DISCONTINUED | OUTPATIENT
Start: 2023-05-11 | End: 2023-05-11

## 2023-05-11 RX ORDER — ONDANSETRON 2 MG/ML
INJECTION INTRAMUSCULAR; INTRAVENOUS PRN
Status: DISCONTINUED | OUTPATIENT
Start: 2023-05-11 | End: 2023-05-11

## 2023-05-11 RX ORDER — SODIUM CHLORIDE, SODIUM LACTATE, POTASSIUM CHLORIDE, CALCIUM CHLORIDE 600; 310; 30; 20 MG/100ML; MG/100ML; MG/100ML; MG/100ML
INJECTION, SOLUTION INTRAVENOUS CONTINUOUS PRN
Status: DISCONTINUED | OUTPATIENT
Start: 2023-05-11 | End: 2023-05-11

## 2023-05-11 RX ORDER — FENTANYL CITRATE 50 UG/ML
25 INJECTION, SOLUTION INTRAMUSCULAR; INTRAVENOUS
Status: DISCONTINUED | OUTPATIENT
Start: 2023-05-11 | End: 2023-05-11 | Stop reason: HOSPADM

## 2023-05-11 RX ORDER — KETOROLAC TROMETHAMINE 30 MG/ML
INJECTION, SOLUTION INTRAMUSCULAR; INTRAVENOUS PRN
Status: DISCONTINUED | OUTPATIENT
Start: 2023-05-11 | End: 2023-05-11

## 2023-05-11 RX ORDER — ONDANSETRON 2 MG/ML
4 INJECTION INTRAMUSCULAR; INTRAVENOUS EVERY 30 MIN PRN
Status: DISCONTINUED | OUTPATIENT
Start: 2023-05-11 | End: 2023-05-11 | Stop reason: HOSPADM

## 2023-05-11 RX ORDER — ESMOLOL HYDROCHLORIDE 10 MG/ML
INJECTION INTRAVENOUS PRN
Status: DISCONTINUED | OUTPATIENT
Start: 2023-05-11 | End: 2023-05-11

## 2023-05-11 RX ORDER — IOPAMIDOL 755 MG/ML
90 INJECTION, SOLUTION INTRAVASCULAR ONCE
Status: COMPLETED | OUTPATIENT
Start: 2023-05-11 | End: 2023-05-11

## 2023-05-11 RX ADMIN — PIPERACILLIN AND TAZOBACTAM 3.38 G: 3; .375 INJECTION, POWDER, LYOPHILIZED, FOR SOLUTION INTRAVENOUS at 05:49

## 2023-05-11 RX ADMIN — GLYCOPYRROLATE 0.2 MG: 0.2 INJECTION INTRAMUSCULAR; INTRAVENOUS at 12:46

## 2023-05-11 RX ADMIN — FENTANYL CITRATE 100 MCG: 50 INJECTION, SOLUTION INTRAMUSCULAR; INTRAVENOUS at 12:46

## 2023-05-11 RX ADMIN — PROPOFOL 200 MG: 10 INJECTION, EMULSION INTRAVENOUS at 12:46

## 2023-05-11 RX ADMIN — SUGAMMADEX 300 MG: 100 INJECTION, SOLUTION INTRAVENOUS at 13:33

## 2023-05-11 RX ADMIN — HYDROMORPHONE HYDROCHLORIDE 0.5 MG: 1 INJECTION, SOLUTION INTRAMUSCULAR; INTRAVENOUS; SUBCUTANEOUS at 13:12

## 2023-05-11 RX ADMIN — ROCURONIUM BROMIDE 50 MG: 50 INJECTION, SOLUTION INTRAVENOUS at 12:46

## 2023-05-11 RX ADMIN — IOPAMIDOL 90 ML: 755 INJECTION, SOLUTION INTRAVENOUS at 04:56

## 2023-05-11 RX ADMIN — HYDROMORPHONE HYDROCHLORIDE 0.5 MG: 1 INJECTION, SOLUTION INTRAMUSCULAR; INTRAVENOUS; SUBCUTANEOUS at 13:04

## 2023-05-11 RX ADMIN — KETOROLAC TROMETHAMINE 15 MG: 15 INJECTION, SOLUTION INTRAMUSCULAR; INTRAVENOUS at 04:12

## 2023-05-11 RX ADMIN — LIDOCAINE HYDROCHLORIDE 30 ML: 20 SOLUTION ORAL; TOPICAL at 03:21

## 2023-05-11 RX ADMIN — KETAMINE HYDROCHLORIDE 50 MG: 10 INJECTION, SOLUTION INTRAMUSCULAR; INTRAVENOUS at 12:46

## 2023-05-11 RX ADMIN — LIDOCAINE HYDROCHLORIDE 30 MG: 10 INJECTION, SOLUTION INFILTRATION; PERINEURAL at 12:46

## 2023-05-11 RX ADMIN — ALBUTEROL SULFATE 6 PUFF: 90 AEROSOL, METERED RESPIRATORY (INHALATION) at 12:51

## 2023-05-11 RX ADMIN — DEXMEDETOMIDINE HYDROCHLORIDE 8 MCG: 100 INJECTION, SOLUTION INTRAVENOUS at 13:23

## 2023-05-11 RX ADMIN — Medication 2 G: at 12:58

## 2023-05-11 RX ADMIN — ESMOLOL HYDROCHLORIDE 30 MG: 10 INJECTION, SOLUTION INTRAVENOUS at 13:15

## 2023-05-11 RX ADMIN — SODIUM CHLORIDE, POTASSIUM CHLORIDE, SODIUM LACTATE AND CALCIUM CHLORIDE: 600; 310; 30; 20 INJECTION, SOLUTION INTRAVENOUS at 13:31

## 2023-05-11 RX ADMIN — ALBUTEROL SULFATE 6 PUFF: 90 AEROSOL, METERED RESPIRATORY (INHALATION) at 13:39

## 2023-05-11 RX ADMIN — MIDAZOLAM 2 MG: 1 INJECTION INTRAMUSCULAR; INTRAVENOUS at 12:36

## 2023-05-11 RX ADMIN — PROPOFOL 50 MCG/KG/MIN: 10 INJECTION, EMULSION INTRAVENOUS at 12:58

## 2023-05-11 RX ADMIN — ALBUTEROL SULFATE 6 PUFF: 90 AEROSOL, METERED RESPIRATORY (INHALATION) at 13:44

## 2023-05-11 RX ADMIN — SODIUM CHLORIDE, POTASSIUM CHLORIDE, SODIUM LACTATE AND CALCIUM CHLORIDE: 600; 310; 30; 20 INJECTION, SOLUTION INTRAVENOUS at 12:36

## 2023-05-11 RX ADMIN — DEXAMETHASONE SODIUM PHOSPHATE 10 MG: 10 INJECTION, SOLUTION INTRAMUSCULAR; INTRAVENOUS at 12:46

## 2023-05-11 RX ADMIN — ONDANSETRON 4 MG: 2 INJECTION INTRAMUSCULAR; INTRAVENOUS at 13:26

## 2023-05-11 RX ADMIN — KETOROLAC TROMETHAMINE 15 MG: 30 INJECTION, SOLUTION INTRAMUSCULAR at 13:26

## 2023-05-11 RX ADMIN — LIDOCAINE HYDROCHLORIDE 50 MG: 10 INJECTION, SOLUTION INFILTRATION; PERINEURAL at 12:43

## 2023-05-11 ASSESSMENT — ACTIVITIES OF DAILY LIVING (ADL)
ADLS_ACUITY_SCORE: 35
ADLS_ACUITY_SCORE: 20
ADLS_ACUITY_SCORE: 33
ADLS_ACUITY_SCORE: 35
ADLS_ACUITY_SCORE: 35
ADLS_ACUITY_SCORE: 20
ADLS_ACUITY_SCORE: 20

## 2023-05-11 NOTE — OP NOTE
Operative Note    Name:  Saundra Urrutia  PCP:  Karla Dumont  Procedure Date:  5/11/2023       Procedure:  Procedure(s):  CHOLECYSTECTOMY, LAPAROSCOPIC     Pre-Procedure Diagnosis:  Acute cholecystitis [K81.0]     Post-Procedure Diagnosis:    Same     Surgeon(s):  Verenice Roche MD     Assistant: None      Anesthesia Type:  General       Findings:  Distended edematous gallbladder.    Operative Report:    The patient was properly identified and brought to the operating suite where they were placed in the supine position, general anesthetic was administered and prepped and draped in a sterile fashion.  A small incision was made below the umbilicus and a Veress needle passed into the abdominal cavity which was insufflated with carbon dioxide.  A 5mm trocar port was then placed followed by the camera.  Under direct vision a 10 mm port was placed in the epigastrium and two 5 mm ports in the right upper quadrant.  The gallbladder was visualized.  With traction on the gallbladder dissection was carried out in the triangle of Calot where the cystic duct and artery were carefully  identified, dissected free, clipped and divided.  The gallbladder was removed from the gallbladder bed with electrocautery with no difficulty and pulled up through the epigastric incision.  The port was replaced and the entire area irrigated until clear.  Hemostasis was assured.  All the ports removed and each site closed with subcuticular sutures of 4-0 Monocryl.  Sterile dressings were placed.  Each site was also infiltrated with quarter percent Marcaine for a total of 30 mL.  The patient tolerated the procedure well.    Estimated Blood Loss:   5cc    Specimens:    ID Type Source Tests Collected by Time Destination   1 :  Tissue Gallbladder SURGICAL PATHOLOGY EXAM Verenice Roche MD 5/11/2023  1:07 PM            Drains:        Complications:    None    Verenice Roche MD     Date: 5/11/2023  Time: 1:37 PM

## 2023-05-11 NOTE — OR NURSING
Called Dr. Dee regarding prescription as per him he sent prescription electronically to [facility] in [place]

## 2023-05-11 NOTE — ANESTHESIA PREPROCEDURE EVALUATION
Anesthesia Pre-Procedure Evaluation    Patient: Saundra Urrutia   MRN: [id number] : 1995        Procedure : Procedure(s):  CHOLECYSTECTOMY, LAPAROSCOPIC          Past Medical History:   Diagnosis Date     Anxiety      Depression      Depressive disorder     taking meds     Self-injurious behavior 2013    Superficial cutting       Past Surgical History:   Procedure Laterality Date      SECTION N/A 2019    Procedure:  SECTION;  Surgeon: Eleanor Gramajo MD;  Location: [facility] OR     NO HISTORY OF SURGERY        Allergies   Allergen Reactions     Sulfa Antibiotics Other (See Comments)     Red eye from eye drops.  Has never had oral or IV form.      Social History     Tobacco Use     Smoking status: Former     Packs/day: 0.01     Types: Cigarettes     Smokeless tobacco: Current     Tobacco comments:     uses E-cig vape , one cartridge per day   Vaping Use     Vaping status: Every Day     Substances: Nicotine, Flavoring     Devices: Pre-filled or refillable cartridge, Refillable tank     Passive vaping exposure: Yes   Substance Use Topics     Alcohol use: Not Currently     Comment: rare-quit with pregnancy      Wt Readings from Last 1 Encounters:   23 113.4 kg (250 lb)        Anesthesia Evaluation            ROS/MED HX  ENT/Pulmonary:  - neg pulmonary ROS     Neurologic:  - neg neurologic ROS     Cardiovascular:  - neg cardiovascular ROS     METS/Exercise Tolerance:     Hematologic:       Musculoskeletal:       GI/Hepatic:  - neg GI/hepatic ROS     Renal/Genitourinary:  - neg Renal ROS     Endo:     (+) Obesity (BMI 45),     Psychiatric/Substance Use:       Infectious Disease:       Malignancy:       Other:            Physical Exam    Airway        Mallampati: II   TM distance: > 3 FB   Neck ROM: full     Respiratory Devices and Support         Dental           Cardiovascular          Rhythm and rate: regular and normal     Pulmonary   pulmonary exam normal                 OUTSIDE LABS:  CBC:   Lab Results   Component Value Date    WBC 11.4 (H) 05/11/2023    WBC 11.6 (H) 08/19/2019    HGB 13.3 05/11/2023    HGB 10.5 (L) 08/22/2019    HCT 40.3 05/11/2023    HCT 37.9 08/19/2019     05/11/2023     08/19/2019     BMP:   Lab Results   Component Value Date     05/11/2023    POTASSIUM 4.3 05/11/2023    CHLORIDE 105 05/11/2023    CO2 23 05/11/2023    BUN 12.8 05/11/2023    CR 0.82 05/11/2023    CR 0.59 08/15/2019     (H) 05/11/2023     (H) 04/14/2023     COAGS: No results found for: PTT, INR, FIBR  POC:   Lab Results   Component Value Date    HCG Negative 05/11/2023     HEPATIC:   Lab Results   Component Value Date    ALBUMIN 4.4 05/11/2023    PROTTOTAL 7.8 05/11/2023    ALT 53 (H) 05/11/2023    AST 29 05/11/2023    ALKPHOS 83 05/11/2023    BILITOTAL 0.2 05/11/2023     OTHER:   Lab Results   Component Value Date    STEPHENIE 9.7 05/11/2023    LIPASE 29 05/11/2023    TSH 0.96 09/13/2013       Anesthesia Plan    ASA Status:  3   NPO Status:  NPO Appropriate    Anesthesia Type: General.     - Airway: ETT              Consents    Anesthesia Plan(s) and associated risks, benefits, and realistic alternatives discussed. Questions answered and patient/representative(s) expressed understanding.     - Discussed: Risks, Benefits and Alternatives for BOTH SEDATION and the PROCEDURE were discussed     - Discussed with:  Patient         Postoperative Care            Comments:                Shasta Solano MD

## 2023-05-11 NOTE — ANESTHESIA POSTPROCEDURE EVALUATION
Patient: Saundra Urrutia    Procedure: Procedure(s):  CHOLECYSTECTOMY, LAPAROSCOPIC       Anesthesia Type:  General    Note:  Disposition: Outpatient   Postop Pain Control: Uneventful            Sign Out: Well controlled pain   PONV: No   Neuro/Psych: Uneventful            Sign Out: Acceptable/Baseline neuro status   Airway/Respiratory: Uneventful            Sign Out: Acceptable/Baseline resp. status   CV/Hemodynamics: Uneventful            Sign Out: Acceptable CV status; No obvious hypovolemia; No obvious fluid overload   Other NRE:    DID A NON-ROUTINE EVENT OCCUR?            Last vitals:  Vitals Value Taken Time   /70 05/11/23 1400   Temp 36.7  C (98  F) 05/11/23 1355   Pulse 118 05/11/23 1409   Resp 24 05/11/23 1409   SpO2 94 % 05/11/23 1409   Vitals shown include unvalidated device data.    Electronically Signed By: Shasta Solano MD  May 11, 2023  2:10 PM

## 2023-05-11 NOTE — ANESTHESIA CARE TRANSFER NOTE
Patient: Saundra Urrutia    Procedure: Procedure(s):  CHOLECYSTECTOMY, LAPAROSCOPIC       Diagnosis: Acute cholecystitis [K81.0]  Diagnosis Additional Information: No value filed.    Anesthesia Type:   General     Note:    Oropharynx: oral airway in place and spontaneously breathing  Level of Consciousness: drowsy  Oxygen Supplementation: face mask  Level of Supplemental Oxygen (L/min / FiO2): 10  Independent Airway: airway patency not satisfactory and stable  Dentition: dentition unchanged  Vital Signs Stable: post-procedure vital signs reviewed and stable  Report to RN Given: handoff report given  Patient transferred to: PACU    Handoff Report: Identifed the Patient, Identified the Reponsible Provider, Reviewed the pertinent medical history, Discussed the surgical course, Reviewed Intra-OP anesthesia mangement and issues during anesthesia, Set expectations for post-procedure period and Allowed opportunity for questions and acknowledgement of understanding      Vitals:  Vitals Value Taken Time   /76 05/11/23 1356   Temp 36.7  C (98  F) 05/11/23 1355   Pulse 118 05/11/23 1359   Resp 24 05/11/23 1359   SpO2 95 % 05/11/23 1359   Vitals shown include unvalidated device data.    Electronically Signed By: ALBA De La Garza CRNA  May 11, 2023  2:00 PM

## 2023-05-11 NOTE — CONSULTS
General Surgery Consultation  Saundra Urrutia MRN# [id number]   Age/Sex: 27 year old female YOB: 1995     Reason for consult: 1. Acute cholecystitis            Requesting physician:  ED physician                   Assessment and Plan:   Assessment:  Calculus cholecystitis without signs of choledocholithiasis.    Plan:  Laparoscopic cholecystectomy this afternoon  Keep patient n.p.o.          Chief Complaint:     Chief Complaint   Patient presents with     Abdominal Pain        History is obtained from the patient    HPI:   Saundra Urrutia is a 27 year old female who presents to the emergency room with epigastric pain that radiates to her mid back.  Past medical history significant for depression and nicotine abuse via vaping.  Patient reports that she had an abrupt episode of intense epigastric pain that radiated to her back accompanied with nausea and diaphoresis.  Patient states that she had this about 3 years ago and it went away after about an hour to however this time the pain was not going away and she presented to the emergency department.  Patient states that she tried omeprazole with no relief.  Patient states that the pain came on a couple hours after eating dinner yesterday.  5 PM dinner was the last time she had anything to eat and she had some water this morning about 4:30 AM.  Patient reports that she does not drink alcohol or use illicit drugs.  Patient denies fever, chills, shortness of breath, chest pain, vomiting, melanotic or acholic stool, urinary changes, rashes or lesions.          Past Medical History:     Past Medical History:   Diagnosis Date     Anxiety      Depression      Depressive disorder     taking meds     Self-injurious behavior 2013    Superficial cutting               Past Surgical History:     Past Surgical History:   Procedure Laterality Date      SECTION N/A 2019    Procedure:  SECTION;  Surgeon: Eleanor Gramajo MD;  Location:  "[place] OR     NO HISTORY OF SURGERY               Social History:    reports that she has quit smoking. Her smoking use included cigarettes. She smoked an average of .01 packs per day. She has never used smokeless tobacco. She reports that she does not currently use alcohol. She reports current drug use. Drug: Marijuana.           Family History:     Family History   Problem Relation Age of Onset     Diabetes Paternal Grandfather      Cancer Other      Depression Maternal Grandmother      Depression Mother      Substance Abuse Mother         recovered  A&D     Diabetes Father      Depression Father      Heart Disease Father      Substance Abuse Father         A&D     Breast Cancer Paternal Grandmother      C.A.D. No family hx of               Allergies:     Allergies   Allergen Reactions     Sulfa Antibiotics Other (See Comments)     Red eye from eye drops.  Has never had oral or IV form.              Medications:     Prior to Admission medications    Medication Sig Start Date End Date Taking? Authorizing Provider   sertraline (ZOLOFT) 100 MG tablet Take 1 tablet (100 mg) by mouth daily 4/13/23   Karla Dumont MD              Review of Systems:   The Review of Systems is negative other than noted in the HPI            Physical Exam:     Patient Vitals for the past 24 hrs:   BP Temp Temp src Pulse Resp SpO2 Height Weight   05/11/23 0724 117/81 -- -- -- -- -- -- --   05/11/23 0532 136/62 -- -- 92 -- 97 % -- --   05/11/23 0504 118/81 -- -- 89 -- 97 % -- --   05/11/23 0503 -- -- -- 89 -- 96 % -- --   05/11/23 0230 (!) 140/76 98.4  F (36.9  C) Temporal 65 16 98 % 1.575 m (5' 2\") 113.4 kg (250 lb)          Intake/Output Summary (Last 24 hours) at 5/11/2023 1028  Last data filed at 5/11/2023 0656  Gross per 24 hour   Intake 100 ml   Output --   Net 100 ml      Constitutional:   awake, alert, cooperative, no apparent distress, and appears stated age       Eyes:   PERRL, conjunctiva/corneas clear, EOM's intact; no scleral " edema or icterus noted        ENT:   Normocephalic, without obvious abnormality, atraumatic, Lips, mucosa, and tongue normal        Lungs:   Normal respiratory effort, no accessory muscle use       Cardiovascular:   Regular rate and rhythm       Abdomen:   Soft, obese, tenderness with deep palpation in the right upper quadrant, positive Eaton sign       Musculoskeletal:   No obvious swelling, bruising or deformity       Skin:   Skin color and texture normal for patient, no rashes or lesions              Data:         All imaging studies reviewed by me.    Results for orders placed or performed during the hospital encounter of 05/11/23 (from the past 24 hour(s))   CBC with platelets + differential    Narrative    The following orders were created for panel order CBC with platelets + differential.  Procedure                               Abnormality         Status                     ---------                               -----------         ------                     CBC with platelets and d...[[id number]]  Abnormal            Final result                 Please view results for these tests on the individual orders.   Basic metabolic panel   Result Value Ref Range    Sodium 141 136 - 145 mmol/L    Potassium 4.3 3.4 - 5.3 mmol/L    Chloride 105 98 - 107 mmol/L    Carbon Dioxide (CO2) 23 22 - 29 mmol/L    Anion Gap 13 7 - 15 mmol/L    Urea Nitrogen 12.8 6.0 - 20.0 mg/dL    Creatinine 0.82 0.51 - 0.95 mg/dL    Calcium 9.7 8.6 - 10.0 mg/dL    Glucose 120 (H) 70 - 99 mg/dL    GFR Estimate >90 >60 mL/min/1.73m2   Hepatic function panel   Result Value Ref Range    Protein Total 7.8 6.4 - 8.3 g/dL    Albumin 4.4 3.5 - 5.2 g/dL    Bilirubin Total 0.2 <=1.2 mg/dL    Alkaline Phosphatase 83 35 - 104 U/L    AST 29 10 - 35 U/L    ALT 53 (H) 10 - 35 U/L    Bilirubin Direct <0.20 0.00 - 0.30 mg/dL   Lipase   Result Value Ref Range    Lipase 29 13 - 60 U/L   CBC with platelets and differential   Result Value Ref Range    WBC Count  11.4 (H) 4.0 - 11.0 10e3/uL    RBC Count 4.35 3.80 - 5.20 10e6/uL    Hemoglobin 13.3 11.7 - 15.7 g/dL    Hematocrit 40.3 35.0 - 47.0 %    MCV 93 78 - 100 fL    MCH 30.6 26.5 - 33.0 pg    MCHC 33.0 31.5 - 36.5 g/dL    RDW 13.5 10.0 - 15.0 %    Platelet Count 397 150 - 450 10e3/uL    % Neutrophils 74 %    % Lymphocytes 20 %    % Monocytes 4 %    % Eosinophils 1 %    % Basophils 0 %    % Immature Granulocytes 1 %    NRBCs per 100 WBC 0 <1 /100    Absolute Neutrophils 8.4 (H) 1.6 - 8.3 10e3/uL    Absolute Lymphocytes 2.3 0.8 - 5.3 10e3/uL    Absolute Monocytes 0.5 0.0 - 1.3 10e3/uL    Absolute Eosinophils 0.1 0.0 - 0.7 10e3/uL    Absolute Basophils 0.0 0.0 - 0.2 10e3/uL    Absolute Immature Granulocytes 0.1 <=0.4 10e3/uL    Absolute NRBCs 0.0 10e3/uL   CT Abdomen Pelvis w Contrast    Narrative    EXAM: CT ABDOMEN PELVIS W CONTRAST  LOCATION: [facility]  DATE/TIME: 5/11/2023 5:06 AM CDT    INDICATION: Upper abdominal pain  COMPARISON: None.  TECHNIQUE: CT scan of the abdomen and pelvis was performed following injection of IV contrast. Multiplanar reformats were obtained. Dose reduction techniques were used.  CONTRAST: 90 mL Isovue-370    FINDINGS:   LOWER CHEST: No significant abnormalities.    HEPATOBILIARY: Liver is normal in size with mild, diffuse steatosis. No focal hepatic lesion or surface nodularity. Gallstones are present with mild gallbladder wall thickening.    PANCREAS: Normal.    SPLEEN: Normal.    ADRENAL GLANDS: Normal.    KIDNEYS/BLADDER: Normal.    BOWEL: Normal, including the appendix.    LYMPH NODES: Normal.    VASCULATURE: Unremarkable.    PELVIC ORGANS: Normal. And IUD is in appropriate position    MUSCULOSKELETAL: Normal.        Impression    IMPRESSION:     Cholelithiasis with positive CT findings for acute cholecystitis.   HCG qualitative urine   Result Value Ref Range    hCG Urine Qualitative Negative Negative        Tracy Jerez, ALBA CNP

## 2023-05-11 NOTE — ED PROVIDER NOTES
EMERGENCY DEPARTMENT ENCOUNTER      NAME: Saundra Urrutia  AGE: 27 year old female  YOB: 1995  MRN: [id number]  EVALUATION DATE & TIME: 2023  2:35 AM    PCP: Karla Dumont    ED PROVIDER: Kike Brock D.O.      Chief Complaint   Patient presents with     Abdominal Pain       FINAL IMPRESSION:  1. Acute cholecystitis        ED COURSE & MEDICAL DECISION MAKIN:45 AM I met with the patient to gather history and to perform my initial exam. I discussed the plan for care while in the Emergency Department.  4:00 AM Nurse reports the patient still complains of pain after receiving the GI cocktail  5:19 AM I updated the patient with lab and imaging results and discussed the plan for surgery   5:25 AM I spoke with Dr. Conroy with General Surgery          Pertinent Labs & Imaging studies reviewed. (See chart for details)  27 year old female presents to the Emergency Department for evaluation of upper abdominal pain.  Initial differential included gastritis, cholecystitis, choledocholithiasis, cholelithiasis, pancreatitis.  Patient has had the symptoms before, resolved with GI cocktail, so we did attempt that however it was unsuccessful.  Therefore we sent her for CT imaging.  Lab testing was largely unremarkable other than mildly elevated WBC, however CT imaging does show what appears to be cholecystitis.  I consulted with general surgery, and the patient will be taken to the OR for cholecystectomy later this morning.  Antibiotics were started in the emergency department    Medical Decision Making    History:    Supplemental history from: Documented in chart, if applicable    External Record(s) reviewed: Documented in chart, if applicable.    Work Up:    Chart documentation includes differential considered and any EKGs or imaging independently interpreted by provider, where specified.    In additional to work up documented, I considered the following work up: Documented in chart, if  "applicable.    External consultation:    Discussion of management with another provider: Documented in chart, if applicable and General Surgery    Complicating factors:    Care impacted by chronic illness: Mental Health, Smoking / Nicotine Use and Other: obesity     Care affected by social determinants of health: N/A    Disposition considerations: Admit.        At the conclusion of the encounter I discussed the results of all of the tests and the disposition. The questions were answered. The patient or family acknowledged understanding and was agreeable with the care plan.      HPI    Patient information was obtained from: Patient     Use of : N/A      Saundra Urrutia is a 27 year old female who presents with abdominal pain     The patient reports epigastric abdominal pain that radiates to the back. The patient also reports feeling nauseous and \"super sweaty\" right now.  The patient notes they have been seen for this before and that a GI cocktail helps relieve the symptoms. The patient denies dysuria, hematuria, or vomiting.  The patient notes they asked their primary care physician about this and was told to try omeprazole, which did not help.     The patient denies a history of medical problems     Social history: The patient reports they vape nicotine and \"not really\" drinks alcohol     REVIEW OF SYSTEMS  Constitutional:  Denies fever, chills, weight loss or weakness. Positive for diaphoresis   Eyes:  No pain, discharge, redness  HENT:  Denies sore throat, ear pain, congestion  Respiratory: No SOB, wheeze or cough  Cardiovascular:  No CP, palpitations  GI:  Denies vomiting, diarrhea. Positive for epigastric abdominal pain (radiates to the back) and nausea   : Denies dysuria, hematuria  Musculoskeletal:  Denies any new muscle/joint pain, swelling or loss of function.  Skin:  Denies rash, pallor  Neurologic:  Denies headache, focal weakness or sensory changes  Lymph: Denies swollen nodes    All other " systems negative unless noted in HPI.    PAST MEDICAL HISTORY:  Past Medical History:   Diagnosis Date     Anxiety      Depression      Depressive disorder     taking meds     Self-injurious behavior 2013    Superficial cutting        PAST SURGICAL HISTORY:  Past Surgical History:   Procedure Laterality Date      SECTION N/A 2019    Procedure:  SECTION;  Surgeon: Eleanor Gramajo MD;  Location: [facility] OR     NO HISTORY OF SURGERY           CURRENT MEDICATIONS:    Current Facility-Administered Medications   Medication     piperacillin-tazobactam (ZOSYN) 3.375 g vial to attach to  mL bag     Current Outpatient Medications   Medication     sertraline (ZOLOFT) 100 MG tablet         ALLERGIES:  Allergies   Allergen Reactions     Sulfa Antibiotics Other (See Comments)     Red eye from eye drops.  Has never had oral or IV form.       FAMILY HISTORY:  Family History   Problem Relation Age of Onset     Diabetes Paternal Grandfather      Cancer Other      Depression Maternal Grandmother      Depression Mother      Substance Abuse Mother         recovered  A&D     Diabetes Father      Depression Father      Heart Disease Father      Substance Abuse Father         A&D     Breast Cancer Paternal Grandmother      C.A.D. No family hx of        SOCIAL HISTORY:  Social History     Socioeconomic History     Marital status: Single     Number of children: 0     Years of education: 9th   Occupational History     Employer: [employer]   Tobacco Use     Smoking status: Former     Packs/day: 0.01     Types: Cigarettes     Smokeless tobacco: Never     Tobacco comments:     uses E-cig   Vaping Use     Vaping status: Every Day     Substances: Nicotine, Flavoring     Devices: Pre-filled or refillable cartridge, Refillable tank     Passive vaping exposure: Yes   Substance and Sexual Activity     Alcohol use: Not Currently     Comment: rare-quit with pregnancy     Drug use: Yes     Types: Marijuana     Comment:  "last use 12/10/18     Sexual activity: Not Currently     Partners: Male       VITALS:  Patient Vitals for the past 24 hrs:   BP Temp Temp src Pulse Resp SpO2 Height Weight   05/11/23 0504 118/81 -- -- 89 -- 97 % -- --   05/11/23 0503 -- -- -- 89 -- 96 % -- --   05/11/23 0230 (!) 140/76 98.4  F (36.9  C) Temporal 65 16 98 % 1.575 m (5' 2\") 113.4 kg (250 lb)       PHYSICAL EXAM    VITAL SIGNS: /81   Pulse 89   Temp 98.4  F (36.9  C) (Temporal)   Resp 16   Ht 1.575 m (5' 2\")   Wt 113.4 kg (250 lb)   SpO2 97%   BMI 45.73 kg/m      General Appearance: Well-appearing, well-nourished, no acute distress   Head:  Normocephalic, without obvious abnormality, atraumatic  Eyes:  PERRL, conjunctiva/corneas clear, EOM's intact,  ENT:  Lips, mucosa, and tongue normal, membranes are moist without pallor  Neck:  Normal ROM, symmetrical, trachea midline    Cardio:  Regular rate and rhythm, no murmur, rub or gallop, 2+ pulses symmetric in all extremities  Pulm:  Clear to auscultation bilaterally, respirations unlabored,  Abdomen:  Soft, no rebound or guarding. Epigastric tenderness   Musculoskeletal: Full ROM, no edema, no cyanosis, good ROM of major joints  Integument:  Warm, Dry, No erythema, No rash.    Neurologic:  Alert & oriented.  No focal deficits appreciated.  Ambulatory.  Psychiatric:  Affect normal, Judgment normal, Mood normal.      LABS  Results for orders placed or performed during the hospital encounter of 05/11/23 (from the past 24 hour(s))   CBC with platelets + differential    Narrative    The following orders were created for panel order CBC with platelets + differential.  Procedure                               Abnormality         Status                     ---------                               -----------         ------                     CBC with platelets and d...[[id number]]  Abnormal            Final result                 Please view results for these tests on the individual orders.   Basic " metabolic panel   Result Value Ref Range    Sodium 141 136 - 145 mmol/L    Potassium 4.3 3.4 - 5.3 mmol/L    Chloride 105 98 - 107 mmol/L    Carbon Dioxide (CO2) 23 22 - 29 mmol/L    Anion Gap 13 7 - 15 mmol/L    Urea Nitrogen 12.8 6.0 - 20.0 mg/dL    Creatinine 0.82 0.51 - 0.95 mg/dL    Calcium 9.7 8.6 - 10.0 mg/dL    Glucose 120 (H) 70 - 99 mg/dL    GFR Estimate >90 >60 mL/min/1.73m2   Hepatic function panel   Result Value Ref Range    Protein Total 7.8 6.4 - 8.3 g/dL    Albumin 4.4 3.5 - 5.2 g/dL    Bilirubin Total 0.2 <=1.2 mg/dL    Alkaline Phosphatase 83 35 - 104 U/L    AST 29 10 - 35 U/L    ALT 53 (H) 10 - 35 U/L    Bilirubin Direct <0.20 0.00 - 0.30 mg/dL   Lipase   Result Value Ref Range    Lipase 29 13 - 60 U/L   CBC with platelets and differential   Result Value Ref Range    WBC Count 11.4 (H) 4.0 - 11.0 10e3/uL    RBC Count 4.35 3.80 - 5.20 10e6/uL    Hemoglobin 13.3 11.7 - 15.7 g/dL    Hematocrit 40.3 35.0 - 47.0 %    MCV 93 78 - 100 fL    MCH 30.6 26.5 - 33.0 pg    MCHC 33.0 31.5 - 36.5 g/dL    RDW 13.5 10.0 - 15.0 %    Platelet Count 397 150 - 450 10e3/uL    % Neutrophils 74 %    % Lymphocytes 20 %    % Monocytes 4 %    % Eosinophils 1 %    % Basophils 0 %    % Immature Granulocytes 1 %    NRBCs per 100 WBC 0 <1 /100    Absolute Neutrophils 8.4 (H) 1.6 - 8.3 10e3/uL    Absolute Lymphocytes 2.3 0.8 - 5.3 10e3/uL    Absolute Monocytes 0.5 0.0 - 1.3 10e3/uL    Absolute Eosinophils 0.1 0.0 - 0.7 10e3/uL    Absolute Basophils 0.0 0.0 - 0.2 10e3/uL    Absolute Immature Granulocytes 0.1 <=0.4 10e3/uL    Absolute NRBCs 0.0 10e3/uL   CT Abdomen Pelvis w Contrast    Narrative    EXAM: CT ABDOMEN PELVIS W CONTRAST  LOCATION: [facility]  DATE/TIME: 5/11/2023 5:06 AM CDT    INDICATION: Upper abdominal pain  COMPARISON: None.  TECHNIQUE: CT scan of the abdomen and pelvis was performed following injection of IV contrast. Multiplanar reformats were obtained. Dose reduction techniques were  used.  CONTRAST: 90 mL Isovue-370    FINDINGS:   LOWER CHEST: No significant abnormalities.    HEPATOBILIARY: Liver is normal in size with mild, diffuse steatosis. No focal hepatic lesion or surface nodularity. Gallstones are present with mild gallbladder wall thickening.    PANCREAS: Normal.    SPLEEN: Normal.    ADRENAL GLANDS: Normal.    KIDNEYS/BLADDER: Normal.    BOWEL: Normal, including the appendix.    LYMPH NODES: Normal.    VASCULATURE: Unremarkable.    PELVIC ORGANS: Normal. And IUD is in appropriate position    MUSCULOSKELETAL: Normal.        Impression    IMPRESSION:     Cholelithiasis with positive CT findings for acute cholecystitis.         RADIOLOGY  CT Abdomen Pelvis w Contrast   Final Result   IMPRESSION:       Cholelithiasis with positive CT findings for acute cholecystitis.       .    EKG:  Rate: 59 bpm  Rhythm: Normal Sinus Rhythm  Axis: Normal  Interval: Normal  Conduction: Normal  QRS: Normal  ST: Normal  T-wave: Normal  QT: Not prolonged  Comparison EKG: No previous available for comparison  Impression:  No acute ischemic change     Dr. Brock reviewed and independently interpreted the patient's EKG, with comments made as listed above.        MEDICATIONS GIVEN IN THE EMERGENCY:  Medications   piperacillin-tazobactam (ZOSYN) 3.375 g vial to attach to  mL bag (has no administration in time range)   lidocaine (viscous) (XYLOCAINE) 2 % 15 mL, alum & mag hydroxide-simethicone (MAALOX) 15 mL GI Cocktail (30 mLs Oral $Given 5/11/23 0321)   ketorolac (TORADOL) injection 15 mg (15 mg Intravenous $Given 5/11/23 0412)   iopamidol (ISOVUE-370) solution 90 mL (90 mLs Intravenous $Given 5/11/23 0456)       NEW PRESCRIPTIONS STARTED AT TODAY'S ER VISIT  New Prescriptions    No medications on file        I, Edita Carvajal, am serving as a scribe to document services personally performed by Kike Brock D.O., based on my observations and the provider's statements to me.  I, Kike Brock D.O., attest that  Edita Carvajal is acting in a scribe capacity, has observed my performance of the services and has documented them in accordance with my direction.     Kike Brock D.O.  Emergency Medicine  [facility] EMERGENCY DEPARTMENT  [address] [place] [id number] [phone]  Dept: [phone]     Kike Brock,   05/11/23 0547

## 2023-05-11 NOTE — ED TRIAGE NOTES
Pt here d/t pain in back and ribs/CP states it feels like squeezing. Pain does not radiate. Endorses nausea. Denies dizziness or SOB. Reports that she has been seen for this before, was given a GI cocktail and felt better. Tried Ibuprofen and tums at home w/o relief. No cardiac Hx. PMH: anxiety, depression.

## 2023-05-11 NOTE — ANESTHESIA PROCEDURE NOTES
Airway       Patient location during procedure: OR       Procedure Start/Stop Times: 5/11/2023 12:48 PM  Staff -        Anesthesiologist:  Shasta Solano MD       CRNA: George Puentes APRN CRNA       Performed By: CRNA  Consent for Airway        Urgency: elective  Indications and Patient Condition       Indications for airway management: indira-procedural       Induction type:intravenous       Mask difficulty assessment: 1 - vent by mask    Final Airway Details       Final airway type: endotracheal airway       Successful airway: ETT - single  Endotracheal Airway Details        ETT size (mm): 7.0       Cuffed: yes       Successful intubation technique: direct laryngoscopy       DL Blade Type: Soria 2       Grade View of Cords: 1       Adjucts: stylet and tooth guard       Position: Right       Measured from: lips       Secured at (cm): 22       Bite block used: None    Post intubation assessment        Placement verified by: capnometry, equal breath sounds and chest rise        Number of attempts at approach: 1       Number of other approaches attempted: 0       Secured with: silk tape       Ease of procedure: easy       Dentition: Intact and Unchanged    Medication(s) Administered   Medication Administration Time: 5/11/2023 12:48 PM

## 2023-05-12 LAB
ATRIAL RATE - MUSE: 59 BPM
DIASTOLIC BLOOD PRESSURE - MUSE: NORMAL MMHG
INTERPRETATION ECG - MUSE: NORMAL
P AXIS - MUSE: 3 DEGREES
PATH REPORT.COMMENTS IMP SPEC: NORMAL
PATH REPORT.COMMENTS IMP SPEC: NORMAL
PATH REPORT.FINAL DX SPEC: NORMAL
PATH REPORT.GROSS SPEC: NORMAL
PATH REPORT.MICROSCOPIC SPEC OTHER STN: NORMAL
PATH REPORT.RELEVANT HX SPEC: NORMAL
PHOTO IMAGE: NORMAL
PR INTERVAL - MUSE: 160 MS
QRS DURATION - MUSE: 90 MS
QT - MUSE: 438 MS
QTC - MUSE: 433 MS
R AXIS - MUSE: 12 DEGREES
SYSTOLIC BLOOD PRESSURE - MUSE: NORMAL MMHG
T AXIS - MUSE: 37 DEGREES
VENTRICULAR RATE- MUSE: 59 BPM

## 2023-05-15 ENCOUNTER — OFFICE VISIT (OUTPATIENT)
Dept: ORTHOPEDICS | Facility: CLINIC | Age: 28
End: 2023-05-15
Attending: FAMILY MEDICINE
Payer: COMMERCIAL

## 2023-05-15 ENCOUNTER — ANCILLARY PROCEDURE (OUTPATIENT)
Dept: GENERAL RADIOLOGY | Facility: CLINIC | Age: 28
End: 2023-05-15
Attending: ORTHOPAEDIC SURGERY
Payer: COMMERCIAL

## 2023-05-15 VITALS — SYSTOLIC BLOOD PRESSURE: 120 MMHG | WEIGHT: 250 LBS | DIASTOLIC BLOOD PRESSURE: 84 MMHG | BODY MASS INDEX: 45.73 KG/M2

## 2023-05-15 DIAGNOSIS — G56.03 BILATERAL CARPAL TUNNEL SYNDROME: ICD-10-CM

## 2023-05-15 PROCEDURE — 73110 X-RAY EXAM OF WRIST: CPT | Mod: TC | Performed by: RADIOLOGY

## 2023-05-15 PROCEDURE — 99203 OFFICE O/P NEW LOW 30 MIN: CPT | Performed by: ORTHOPAEDIC SURGERY

## 2023-05-15 NOTE — PROGRESS NOTES
S:Patient is a 27 year old, right hand dominant female seen today in consultation for bilateral carpal tunnel R>L.  They report onset of symptoms 5 years . No  injury.   Pain is located from wrist into hand all fingers , and described as numbness tingling .  Increased pain with overuse, opening jars and cans  .  Pain does  wake at nighttime sometimes. Pain is improved by nothing.  They have tried the following therapies: bracing icing tylenol .    Current pain level: 5/10, Worst pain level: 8/10.     Patient is currently working .            Patient Active Problem List   Diagnosis     HL (hearing loss)     Hyperhidrosis     Obesity     Bilateral carpal tunnel syndrome     Major depressive disorder, recurrent episode, moderate (H)     IUD (intrauterine device) in place     ALTAGRACIA (generalized anxiety disorder)     Other insomnia     Morbid obesity (H)     ASCUS of cervix with negative high risk HPV            Past Medical History:   Diagnosis Date     Anxiety      Depression      Depressive disorder     taking meds     Self-injurious behavior 2013    Superficial cutting             Past Surgical History:   Procedure Laterality Date      SECTION N/A 2019    Procedure:  SECTION;  Surgeon: Eleanor Gramajo MD;  Location: [facility] OR     LAPAROSCOPIC CHOLECYSTECTOMY N/A 2023    Procedure: CHOLECYSTECTOMY, LAPAROSCOPIC;  Surgeon: Verenice Roche MD;  Location: [facility] OR     NO HISTORY OF SURGERY              Social History     Tobacco Use     Smoking status: Former     Packs/day: 0.01     Types: Cigarettes     Smokeless tobacco: Current     Tobacco comments:     uses E-cig vape , one cartridge per day   Vaping Use     Vaping status: Every Day     Substances: Nicotine, Flavoring     Devices: Pre-filled or refillable cartridge, Refillable tank     Passive vaping exposure: Yes   Substance Use Topics     Alcohol use: Not Currently     Comment: rare-quit with pregnancy             Family History   Problem Relation Age of Onset     Diabetes Paternal Grandfather      Cancer Other      Depression Maternal Grandmother      Depression Mother      Substance Abuse Mother         recovered  A&D     Diabetes Father      Depression Father      Heart Disease Father      Substance Abuse Father         A&D     Breast Cancer Paternal Grandmother      C.A.D. No family hx of                Allergies   Allergen Reactions     Sulfa Antibiotics Other (See Comments)     Red eye from eye drops.  Has never had oral or IV form.            Current Outpatient Medications   Medication Sig Dispense Refill     HYDROcodone-acetaminophen (NORCO) 5-325 MG tablet Take 1-2 tablets by mouth every 4 hours as needed for moderate to severe pain 20 tablet 0     sertraline (ZOLOFT) 100 MG tablet Take 1 tablet (100 mg) by mouth daily 90 tablet 3          Review Of Systems  Skin: negative  Eyes: negative  Ears/Nose/Throat: negative  Respiratory: No shortness of breath, dyspnea on exertion, cough, or hemoptysis    O: Physical exam:  + tinel's each wrist at volar wrist crease, + phalen's each hand at about 10 seconds R more than L.  No thenar wasting either upper extremity.    Lab:  No electrodiagnostic studies    Images:  No pathology identified         A: Lopez CTS    P:  See back after bilateral EMG/NCV  Notify if exacerbation symptoms           In addition to the above assessment and plan each active problem on Saundra's problem list was evaluated today. This included the questioning of Saundra for any medication problems. We will continue the current treatment plan for these active problems except as noted.

## 2023-05-15 NOTE — PATIENT INSTRUCTIONS
Thank you for choosing [facility]    Dr. Aguilar Locations:    [facility] [address], [address]  [place] [id number] [place] [id number]   Appointments: [phone] Appointments: [phone]   Fax: [phone] Fax: [phone]       Follow up: after completion of labs and EMG. Please call [phone] to schedule your follow up appointment.     Lab orders have been placed. You can go to your [facility] location that has a lab. Please call your [facility] to schedule a lab appointment. [facility] has a walk-in hospital lab for your convenience.     An order for an EMG was placed today. They will call you to schedule this study.      For any questions please contact my office, [phone].

## 2023-05-15 NOTE — LETTER
5/15/2023         RE: Saundra Urrutia  [address] [place] [address]        Dear Colleague,    Thank you for referring your patient, Saundra Urrutia, to the [facility] [place]. Please see a copy of my visit note below.    S:Patient is a 27 year old, right hand dominant female seen today in consultation for bilateral carpal tunnel R>L.  They report onset of symptoms 5 years . No  injury.   Pain is located from wrist into hand all fingers , and described as numbness tingling .  Increased pain with overuse, opening jars and cans  .  Pain does  wake at nighttime sometimes. Pain is improved by nothing.  They have tried the following therapies: bracing icing tylenol .    Current pain level: 5/10, Worst pain level: 8/10.     Patient is currently working .            Patient Active Problem List   Diagnosis     HL (hearing loss)     Hyperhidrosis     Obesity     Bilateral carpal tunnel syndrome     Major depressive disorder, recurrent episode, moderate (H)     IUD (intrauterine device) in place     ALTAGRACIA (generalized anxiety disorder)     Other insomnia     Morbid obesity (H)     ASCUS of cervix with negative high risk HPV            Past Medical History:   Diagnosis Date     Anxiety      Depression      Depressive disorder     taking meds     Self-injurious behavior 2013    Superficial cutting             Past Surgical History:   Procedure Laterality Date      SECTION N/A 2019    Procedure:  SECTION;  Surgeon: Eleanor Gramajo MD;  Location: [facility] OR     LAPAROSCOPIC CHOLECYSTECTOMY N/A 2023    Procedure: CHOLECYSTECTOMY, LAPAROSCOPIC;  Surgeon: Verenice Roche MD;  Location: [facility] OR     NO HISTORY OF SURGERY              Social History     Tobacco Use     Smoking status: Former     Packs/day: 0.01     Types: Cigarettes     Smokeless tobacco: Current     Tobacco comments:     uses E-cig vape , one cartridge per day   Vaping  Use     Vaping status: Every Day     Substances: Nicotine, Flavoring     Devices: Pre-filled or refillable cartridge, Refillable tank     Passive vaping exposure: Yes   Substance Use Topics     Alcohol use: Not Currently     Comment: rare-quit with pregnancy            Family History   Problem Relation Age of Onset     Diabetes Paternal Grandfather      Cancer Other      Depression Maternal Grandmother      Depression Mother      Substance Abuse Mother         recovered  A&D     Diabetes Father      Depression Father      Heart Disease Father      Substance Abuse Father         A&D     Breast Cancer Paternal Grandmother      C.A.D. No family hx of                Allergies   Allergen Reactions     Sulfa Antibiotics Other (See Comments)     Red eye from eye drops.  Has never had oral or IV form.            Current Outpatient Medications   Medication Sig Dispense Refill     HYDROcodone-acetaminophen (NORCO) 5-325 MG tablet Take 1-2 tablets by mouth every 4 hours as needed for moderate to severe pain 20 tablet 0     sertraline (ZOLOFT) 100 MG tablet Take 1 tablet (100 mg) by mouth daily 90 tablet 3          Review Of Systems  Skin: negative  Eyes: negative  Ears/Nose/Throat: negative  Respiratory: No shortness of breath, dyspnea on exertion, cough, or hemoptysis    O: Physical exam:  + tinel's each wrist at volar wrist crease, + phalen's each hand at about 10 seconds R more than L.  No thenar wasting either upper extremity.    Lab:  No electrodiagnostic studies    Images:  No pathology identified         A: Lopez CTS    P:  See back after bilateral EMG/NCV  Notify if exacerbation symptoms           In addition to the above assessment and plan each active problem on Saundra's problem list was evaluated today. This included the questioning of Saundra for any medication problems. We will continue the current treatment plan for these active problems except as noted.        Again, thank you for allowing me to participate in the  care of your patient.        Sincerely,        SHERIF DRISCOLL MD

## 2023-05-19 ENCOUNTER — LAB (OUTPATIENT)
Dept: LAB | Facility: CLINIC | Age: 28
End: 2023-05-19
Payer: COMMERCIAL

## 2023-05-19 DIAGNOSIS — G56.03 BILATERAL CARPAL TUNNEL SYNDROME: ICD-10-CM

## 2023-05-19 DIAGNOSIS — Z13.1 SCREENING FOR DIABETES MELLITUS: ICD-10-CM

## 2023-05-19 LAB
CRP SERPL-MCNC: 27.3 MG/L
D DIMER PPP FEU-MCNC: 1 UG/ML FEU (ref 0–0.5)
ERYTHROCYTE [SEDIMENTATION RATE] IN BLOOD BY WESTERGREN METHOD: 24 MM/HR (ref 0–20)
HBA1C MFR BLD: 5.1 % (ref 0–5.6)
T3FREE SERPL-MCNC: 3.3 PG/ML (ref 2–4.4)
TSH SERPL DL<=0.005 MIU/L-ACNC: 1.32 UIU/ML (ref 0.3–4.2)
URATE SERPL-MCNC: 5.2 MG/DL (ref 2.4–5.7)

## 2023-05-19 PROCEDURE — 86038 ANTINUCLEAR ANTIBODIES: CPT

## 2023-05-19 PROCEDURE — 86225 DNA ANTIBODY NATIVE: CPT

## 2023-05-19 PROCEDURE — 86200 CCP ANTIBODY: CPT

## 2023-05-19 PROCEDURE — 84550 ASSAY OF BLOOD/URIC ACID: CPT

## 2023-05-19 PROCEDURE — 85379 FIBRIN DEGRADATION QUANT: CPT

## 2023-05-19 PROCEDURE — 84443 ASSAY THYROID STIM HORMONE: CPT

## 2023-05-19 PROCEDURE — 86431 RHEUMATOID FACTOR QUANT: CPT

## 2023-05-19 PROCEDURE — 36415 COLL VENOUS BLD VENIPUNCTURE: CPT

## 2023-05-19 PROCEDURE — 83036 HEMOGLOBIN GLYCOSYLATED A1C: CPT

## 2023-05-19 PROCEDURE — 84481 FREE ASSAY (FT-3): CPT

## 2023-05-19 PROCEDURE — 86140 C-REACTIVE PROTEIN: CPT

## 2023-05-19 PROCEDURE — 85652 RBC SED RATE AUTOMATED: CPT

## 2023-05-22 ENCOUNTER — OFFICE VISIT (OUTPATIENT)
Dept: FAMILY MEDICINE | Facility: CLINIC | Age: 28
End: 2023-05-22
Payer: COMMERCIAL

## 2023-05-22 VITALS
RESPIRATION RATE: 18 BRPM | OXYGEN SATURATION: 97 % | SYSTOLIC BLOOD PRESSURE: 116 MMHG | HEART RATE: 88 BPM | WEIGHT: 250 LBS | BODY MASS INDEX: 46.01 KG/M2 | HEIGHT: 62 IN | TEMPERATURE: 98.5 F | DIASTOLIC BLOOD PRESSURE: 78 MMHG

## 2023-05-22 DIAGNOSIS — Z90.49 HISTORY OF CHOLECYSTECTOMY: Primary | ICD-10-CM

## 2023-05-22 LAB
ANA SER QL IF: NEGATIVE
RHEUMATOID FACT SER NEPH-ACNC: <7 IU/ML

## 2023-05-22 PROCEDURE — 99024 POSTOP FOLLOW-UP VISIT: CPT | Performed by: PHYSICIAN ASSISTANT

## 2023-05-22 ASSESSMENT — ENCOUNTER SYMPTOMS
CONSTITUTIONAL NEGATIVE: 1
RESPIRATORY NEGATIVE: 1
GASTROINTESTINAL NEGATIVE: 1

## 2023-05-22 NOTE — LETTER
May 22, 2023      Saundra Urrutia  [address] [facility] [address]        To Whom It May Concern:    Saundra Urrutia was seen in our clinic. 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      Sincerely,        SRINIVAS Emery

## 2023-05-22 NOTE — PROGRESS NOTES
"  Assessment & Plan     History of cholecystectomy  She may return to work without limitation or restriction             MED REC REQUIRED  Post Medication Reconciliation Status:   BMI:   Estimated body mass index is 45.73 kg/m  as calculated from the following:    Height as of this encounter: 1.575 m (5' 2\").    Weight as of this encounter: 113.4 kg (250 lb).           SRINIVAS Emery  [facility]    Jef Arguello is a 27 year old, presenting for the following health issues:  Forms (Return To Work) and Post-Op - General Surgery    27-year-old here 2 weeks out status post laparoscopic cholecystectomy needs clearance to return to work  She feels fine  She has no fever no chills  Appetite is fine  If she has a dietary indiscretion she will have some looser stools but understands she needs to watch this intake  No other complaints or concerns the form she brings in was reviewed in its entirety               Review of Systems   Constitutional: Negative.    Respiratory: Negative.    Gastrointestinal: Negative.             Objective    /78   Pulse 88   Temp 98.5  F (36.9  C)   Resp 18   Ht 1.575 m (5' 2\")   Wt 113.4 kg (250 lb)   LMP  (LMP Unknown)   SpO2 97%   BMI 45.73 kg/m    Body mass index is 45.73 kg/m .  Physical Exam alert attentive no acute distress  Her port sites all look like healing well without sign of infection                      "

## 2023-05-25 LAB
CCP AB SER IA-ACNC: 1.6 U/ML
DSDNA AB SER-ACNC: 1.2 IU/ML

## 2023-08-11 ENCOUNTER — OFFICE VISIT (OUTPATIENT)
Dept: NEUROLOGY | Facility: CLINIC | Age: 28
End: 2023-08-11
Attending: ORTHOPAEDIC SURGERY
Payer: COMMERCIAL

## 2023-08-11 DIAGNOSIS — G56.03 BILATERAL CARPAL TUNNEL SYNDROME: Primary | ICD-10-CM

## 2023-08-11 PROCEDURE — 95886 MUSC TEST DONE W/N TEST COMP: CPT | Mod: RT | Performed by: PSYCHIATRY & NEUROLOGY

## 2023-08-11 PROCEDURE — 95912 NRV CNDJ TEST 11-12 STUDIES: CPT | Performed by: PSYCHIATRY & NEUROLOGY

## 2023-08-11 PROCEDURE — 95886 MUSC TEST DONE W/N TEST COMP: CPT | Mod: 59 | Performed by: PSYCHIATRY & NEUROLOGY

## 2023-08-11 NOTE — LETTER
8/11/2023         RE: Saundra Urrutia  [address]  [address]  [facility] [address]        Dear Colleague,    Thank you for referring your patient, Saundra Urrutia, to the [facility] [place]. Please see a copy of my visit note below.    See procedure note for EMG report      Again, thank you for allowing me to participate in the care of your patient.        Sincerely,        Mario Alberto Golden MD

## 2023-08-11 NOTE — PROCEDURES
ELECTROMYOGRAPHY (EMG) REPORT       [facility] [place]  [address] [place] [address]  Tel: [phone]  Fax: [phone]  [email or web address]     Saundra Urrutia,  1995, MRN [id number]  PCP: Karla Dumont  Date: 2023     Principal Diagnosis: Bilateral carpal tunnel syndrome     Height: 5 feet 2 inch  Reason for referral: Evaluate bilateral uppers. c/o numbness, tingling in both hands/fingers. mainly in digits I-III > 3 years. Right > Left.       Motor NCS      Nerve / Sites Lat Amp Dist Faustino    ms mV cm m/s   R Median - APB      Wrist 7.34 3.3 7       Elbow 11.15 3.3 22 58   L Median - APB      Wrist 5.83 3.7 7       Elbow 9.74 3.7 21.5 55   R Ulnar - ADM      Wrist 2.08 8.9 7       B.Elbow 5.68 7.9 19 53      A.Elbow 7.97 7.6 12 52   L Ulnar - ADM      Wrist 2.50 7.5 7       B.Elbow 5.10 7.2 17 65      A.Elbow 7.08 7.3 12 61       F  Wave      Nerve Fmin    ms   R Ulnar - ADM 27.24   L Ulnar - ADM 26.15       Sensory NCS      Nerve / Sites Onset Lat Pk Lat Amp.2-3 Dist Faustino Lat Diff    ms ms  V cm m/s ms   R Median - II (Antidr)      Wrist NR NR NR 13 NR    L Median - II (Antidr)      Wrist 4.06 5.21 6.0 13 32    R Ulnar - V (Antidr)      Wrist 1.93 2.55 15.9 11 57    L Ulnar - V (Antidr)      Wrist 1.88 2.34 44.9 11 59    R Median, Ulnar - Transcarpal comparison      Median Palm NR NR NR 8 NR       Ulnar Palm 1.25 1.67 25.6 8 64          NR   L Median, Ulnar - Transcarpal comparison      Median Palm 2.24 2.81 17.6 8 36       Ulnar Palm 1.30 1.61 25.5 8 61          1.20       EMG Summary Table     Spontaneous MUAP Rcmt Note   Muscle Fib PSW Fasc IA # Amp Dur PPP Rate Type   R. Brachioradialis None None None N N N N N N N   R. Pronator teres None None None N N N N N N N   R. Biceps brachii None None None N N N N N N N   R. Deltoid None None None N N N N N N N   R. Triceps brachii None None None N N N N N N N   R. Flexor carpi ulnaris None None None N N N N N N  N   R. First dorsal interosseous None None None N N N N N N N   R. Abductor pollicis brevis 1+ 1+ None N Sl Mod Red Incr Incr N N N   L. Brachioradialis None None None N N N N N N N   L. Pronator teres None None None N N N N N N N   L. Biceps brachii None None None N N N N N N N   L. Deltoid None None None N N N N N N N   L. Triceps brachii None None None N N N N N N N   L. First dorsal interosseous None None None N N N N N N N   L. Abductor pollicis brevis None None None Incr Sl Red Incr Incr N N N        Summary: Nerve conduction and EMG study of bilateral upper extremities shows:  Normal bilateral ulnar distal motor latencies, amplitude and conduction velocities.  Delayed bilateral median peak sensory latencies with normal amplitude and conduction velocities  Normal bilateral ulnar F latencies  Absent right median SNAP  Delayed left median peak sensory latency with slow sensory nerve conduction velocity.  Normal bilateral ulnar SNAP  Needle exam showed changes as above    Impression:   This is a abnormal nerve conduction and EMG study of bilateral upper extremities that suggests median neuropathy at or distal to the wrist consistent with severe right and moderate to severe left bilateral carpal tunnel syndrome      Mario Alberto Golden MD  [facility]  (Formerly, [facility]      This note was dictated using voice recognition software.  Any grammatical or context distortions are unintentional and inherent to the software.

## 2024-05-21 ENCOUNTER — OFFICE VISIT (OUTPATIENT)
Dept: ORTHOPEDICS | Facility: CLINIC | Age: 29
End: 2024-05-21
Payer: COMMERCIAL

## 2024-05-21 VITALS
HEIGHT: 62 IN | SYSTOLIC BLOOD PRESSURE: 141 MMHG | BODY MASS INDEX: 53 KG/M2 | WEIGHT: 288 LBS | DIASTOLIC BLOOD PRESSURE: 60 MMHG

## 2024-05-21 DIAGNOSIS — G56.02 LEFT CARPAL TUNNEL SYNDROME: ICD-10-CM

## 2024-05-21 DIAGNOSIS — G56.01 RIGHT CARPAL TUNNEL SYNDROME: Primary | ICD-10-CM

## 2024-05-21 PROCEDURE — 99214 OFFICE O/P EST MOD 30 MIN: CPT | Performed by: STUDENT IN AN ORGANIZED HEALTH CARE EDUCATION/TRAINING PROGRAM

## 2024-05-21 NOTE — LETTER
2024         RE: Saundra Urrutia  [address]  [address]  [facility] [address]        Dear Colleague,    Thank you for referring your patient, Saundra Urrutia, to the [facility] [place]. Please see a copy of my visit note below.    Orthopaedic Surgery Hand and Upper Extremity Clinic H&P Note:  Date: May 21, 2024    Patient Name: Saundra Urrutia  MRN: [id number]    Consult requested by: Referred Self    CHIEF COMPLAINT: bilateral carpal tunnel syndrome    Dominant Hand: right  Occupation:       HPI:  Ms. Saundra Urrutia is a 28 year old female right hand dominant who presents with bilateral carpal tunnel syndrome. Her symptoms have been ongoing 6+ years with no known injury or trauma. She describes constant numbness and tingling in the thumb, index and middle fingers bilaterally. She is unable to do tasks like buttoning a shirt or opening a jar. She wakes at time at nighttime due to pain. She has tried bracing in the past but they are no longer helping.  She has tried icing and OTCs.     Patient had EMG completed through [facility] on 23.     PMH  Diabetes: no  Thyroid Problems: no  Smoking: vape      PAST MEDICAL HISTORY:  Past Medical History:   Diagnosis Date     Anxiety      Depression      Depressive disorder     taking meds     Self-injurious behavior 2013    Superficial cutting        PAST SURGICAL HISTORY:  Past Surgical History:   Procedure Laterality Date      SECTION N/A 2019    Procedure:  SECTION;  Surgeon: Eleanor Gramajo MD;  Location: [place] OR     LAPAROSCOPIC CHOLECYSTECTOMY N/A 2023    Procedure: CHOLECYSTECTOMY, LAPAROSCOPIC;  Surgeon: Verenice Roche MD;  Location: [facility] [place] [facility]     NO HISTORY OF SURGERY         MEDICATIONS:  Current Outpatient Medications   Medication Sig Dispense Refill     sertraline (ZOLOFT) 100 MG tablet Take 1 tablet (100 mg) by mouth daily 90 tablet 3  "    No current facility-administered medications for this visit.       ALLERGIES:     Allergies   Allergen Reactions     Sulfa Antibiotics Other (See Comments)     Red eye from eye drops.  Has never had oral or IV form.       FAMILY HISTORY:  No pertinent family history    SOCIAL HISTORY:  Social History     Tobacco Use     Smoking status: Former     Current packs/day: 0.01     Types: Cigarettes     Smokeless tobacco: Current     Tobacco comments:     uses E-cig vape , one cartridge per day   Vaping Use     Vaping status: Every Day     Substances: Nicotine, Flavoring     Devices: Pre-filled or refillable cartridge, Refillable tank     Passive vaping exposure: Yes   Substance Use Topics     Alcohol use: Not Currently     Comment: rare-quit with pregnancy     Drug use: Yes     Types: Marijuana     Comment: last use 12/10/2021       The patient's past medical, family, and social history was reviewed and confirmed.    REVIEW OF SYMPTOMS:      General: Negative   Eyes: Negative   Ear, Nose and Throat: Negative   Respiratory: Negative   Cardiovascular: Negative   Gastrointestinal: Negative   Genito-urinary: Negative   Musculoskeletal: Negative  Neurological: Negative   Psychological: Negative  HEME: Negative   ENDO: Negative   SKIN: Negative    VITALS:  Vitals:    05/21/24 1050   BP: (!) 141/60   Weight: 130.6 kg (288 lb)   Height: 1.575 m (5' 2\")       EXAM:  General: NAD, A&Ox3  HEENT: NC/AT  CV: RRR by peripheral pulse  Pulmonary: Non-labored breathing on RA  BUE:  Skin intact, no deformity, no atrophy  Diminished sensation to light touch in the index finger tips bilaterally, intact in the radial and ulnar  2 point discrimination  Right median 12mm   Left median 6mm  Bilateral ulnar 5 mm  Positive Tinel's and Durkan's compression test at bilateral carpal tunnels  Well-perfused, capillary refill less than 2 seconds      EMG/NCS 8/11/23  Summary: Nerve conduction and EMG study of bilateral upper extremities " shows:  Normal bilateral ulnar distal motor latencies, amplitude and conduction velocities.  Delayed bilateral median peak sensory latencies with normal amplitude and conduction velocities  Normal bilateral ulnar F latencies  Absent right median SNAP  Delayed left median peak sensory latency with slow sensory nerve conduction velocity.  Normal bilateral ulnar SNAP  Needle exam showed changes as above     Impression:   This is a abnormal nerve conduction and EMG study of bilateral upper extremities that suggests median neuropathy at or distal to the wrist consistent with severe right and moderate to severe left bilateral carpal tunnel syndrome  I have personally reviewed the above images and labs.       IMPRESSION AND RECOMMENDATIONS:  Ms. Saundra Urrutia is a 28 year old female right hand dominant with bilateral carpal tunnel syndrome.  Both about equal in severity    Symptoms refractory to conservative management.  We discussed continued treatment with bracing, injections, surgery.  Patient would like to proceed with surgery.    The indications for surgery were discussed with the patient. The benefits, risks, and alternatives of operative management were discussed in detail with the patient. The patient understands that the risks of surgery include, but are not limited to: infection, bleeding, injury to nearby structures (such as nerves, blood vessels, and tendons), temporary weakness in , wound healing problems, need for additional surgery, pain, stiffness, scarring, need for rehabilitation, and anesthetic complications.  Patient expressed understanding and elected to proceed with surgery. All questions were answered to the patient's satisfaction.    Case request placed.  Right side first.  MAC plus local.    Harjinder Sofia MD    [place], Upper Extremity & Microvascular Surgery  Department of Orthopaedic Surgery  [facility]           Again, thank you for allowing me to participate  in the care of your patient.        Sincerely,        Harjinder Sofia MD

## 2024-05-21 NOTE — PROGRESS NOTES
Orthopaedic Surgery Hand and Upper Extremity Clinic H&P Note:  Date: May 21, 2024    Patient Name: Saundra Urrutia  MRN: [id number]    Consult requested by: Referred Self    CHIEF COMPLAINT: bilateral carpal tunnel syndrome    Dominant Hand: right  Occupation:       HPI:  Ms. Saundra Urrutia is a 28 year old female right hand dominant who presents with bilateral carpal tunnel syndrome. Her symptoms have been ongoing 6+ years with no known injury or trauma. She describes constant numbness and tingling in the thumb, index and middle fingers bilaterally. She is unable to do tasks like buttoning a shirt or opening a jar. She wakes at time at nighttime due to pain. She has tried bracing in the past but they are no longer helping.  She has tried icing and OTCs.     Patient had EMG completed through [facility] on 23.     PMH  Diabetes: no  Thyroid Problems: no  Smoking: vape      PAST MEDICAL HISTORY:  Past Medical History:   Diagnosis Date    Anxiety     Depression     Depressive disorder     taking meds    Self-injurious behavior 2013    Superficial cutting        PAST SURGICAL HISTORY:  Past Surgical History:   Procedure Laterality Date     SECTION N/A 2019    Procedure:  SECTION;  Surgeon: Eleanor Gramajo MD;  Location: [place] OR    LAPAROSCOPIC CHOLECYSTECTOMY N/A 2023    Procedure: CHOLECYSTECTOMY, LAPAROSCOPIC;  Surgeon: Verenice Roche MD;  Location: [facility]    NO HISTORY OF SURGERY         MEDICATIONS:  Current Outpatient Medications   Medication Sig Dispense Refill    sertraline (ZOLOFT) 100 MG tablet Take 1 tablet (100 mg) by mouth daily 90 tablet 3     No current facility-administered medications for this visit.       ALLERGIES:     Allergies   Allergen Reactions    Sulfa Antibiotics Other (See Comments)     Red eye from eye drops.  Has never had oral or IV form.       FAMILY HISTORY:  No pertinent family history    SOCIAL  "HISTORY:  Social History     Tobacco Use    Smoking status: Former     Current packs/day: 0.01     Types: Cigarettes    Smokeless tobacco: Current    Tobacco comments:     uses E-cig vape , one cartridge per day   Vaping Use    Vaping status: Every Day    Substances: Nicotine, Flavoring    Devices: Pre-filled or refillable cartridge, Refillable tank    Passive vaping exposure: Yes   Substance Use Topics    Alcohol use: Not Currently     Comment: rare-quit with pregnancy    Drug use: Yes     Types: Marijuana     Comment: last use 12/10/2021       The patient's past medical, family, and social history was reviewed and confirmed.    REVIEW OF SYMPTOMS:      General: Negative   Eyes: Negative   Ear, Nose and Throat: Negative   Respiratory: Negative   Cardiovascular: Negative   Gastrointestinal: Negative   Genito-urinary: Negative   Musculoskeletal: Negative  Neurological: Negative   Psychological: Negative  HEME: Negative   ENDO: Negative   SKIN: Negative    VITALS:  Vitals:    05/21/24 1050   BP: (!) 141/60   Weight: 130.6 kg (288 lb)   Height: 1.575 m (5' 2\")       EXAM:  General: NAD, A&Ox3  HEENT: NC/AT  CV: RRR by peripheral pulse  Pulmonary: Non-labored breathing on RA  BUE:  Skin intact, no deformity, no atrophy  Diminished sensation to light touch in the index finger tips bilaterally, intact in the radial and ulnar  2 point discrimination  Right median 12mm   Left median 6mm  Bilateral ulnar 5 mm  Positive Tinel's and Durkan's compression test at bilateral carpal tunnels  Well-perfused, capillary refill less than 2 seconds      EMG/NCS 8/11/23  Summary: Nerve conduction and EMG study of bilateral upper extremities shows:  Normal bilateral ulnar distal motor latencies, amplitude and conduction velocities.  Delayed bilateral median peak sensory latencies with normal amplitude and conduction velocities  Normal bilateral ulnar F latencies  Absent right median SNAP  Delayed left median peak sensory latency with slow " sensory nerve conduction velocity.  Normal bilateral ulnar SNAP  Needle exam showed changes as above     Impression:   This is a abnormal nerve conduction and EMG study of bilateral upper extremities that suggests median neuropathy at or distal to the wrist consistent with severe right and moderate to severe left bilateral carpal tunnel syndrome  I have personally reviewed the above images and labs.       IMPRESSION AND RECOMMENDATIONS:  Ms. Saundra Urrutia is a 28 year old female right hand dominant with bilateral carpal tunnel syndrome.  Both about equal in severity    Symptoms refractory to conservative management.  We discussed continued treatment with bracing, injections, surgery.  Patient would like to proceed with surgery.    The indications for surgery were discussed with the patient. The benefits, risks, and alternatives of operative management were discussed in detail with the patient. The patient understands that the risks of surgery include, but are not limited to: infection, bleeding, injury to nearby structures (such as nerves, blood vessels, and tendons), temporary weakness in , wound healing problems, need for additional surgery, pain, stiffness, scarring, need for rehabilitation, and anesthetic complications.  Patient expressed understanding and elected to proceed with surgery. All questions were answered to the patient's satisfaction.    Case request placed.  Right side first.  MAC plus local.    Harjinder Sofia MD    Hand, Upper Extremity & Microvascular Surgery  Department of Orthopaedic Surgery  [facility]

## 2024-05-22 ENCOUNTER — HOSPITAL ENCOUNTER (OUTPATIENT)
Facility: CLINIC | Age: 29
End: 2024-05-22
Attending: STUDENT IN AN ORGANIZED HEALTH CARE EDUCATION/TRAINING PROGRAM | Admitting: STUDENT IN AN ORGANIZED HEALTH CARE EDUCATION/TRAINING PROGRAM
Payer: COMMERCIAL

## 2024-05-22 ENCOUNTER — TELEPHONE (OUTPATIENT)
Dept: ORTHOPEDICS | Facility: CLINIC | Age: 29
End: 2024-05-22
Payer: COMMERCIAL

## 2024-05-22 PROBLEM — G56.01 RIGHT CARPAL TUNNEL SYNDROME: Status: ACTIVE | Noted: 2024-05-21

## 2024-05-22 NOTE — TELEPHONE ENCOUNTER
Patient has been scheduled for surgery. Details are below.    Date of Surgery: 08/16/24    Approximate Arrival Time: SURGERY CENTER WILL CALL 3/4 DAYS PRIOR TO CONFIRM A TIME   Surgeon:  DR. MARIZOL CUELLAR     Procedure: RELEASE CARPAL TUNNEL, RIGHT  Location: [facility] [address]  Surgery Consult: NA  PreOp Physical: 07/26/24  PostOp: 08/27/24  Packet Mailed/[place] Sent: YES  Added to [place]: YES    Spoke to: ANGUS

## 2024-06-02 ENCOUNTER — HEALTH MAINTENANCE LETTER (OUTPATIENT)
Age: 29
End: 2024-06-02

## 2024-07-01 DIAGNOSIS — F33.1 MAJOR DEPRESSIVE DISORDER, RECURRENT EPISODE, MODERATE (H): ICD-10-CM

## 2024-07-01 DIAGNOSIS — F41.1 GAD (GENERALIZED ANXIETY DISORDER): ICD-10-CM

## 2024-07-02 RX ORDER — SERTRALINE HYDROCHLORIDE 100 MG/1
100 TABLET, FILM COATED ORAL DAILY
Qty: 90 TABLET | Refills: 0 | Status: SHIPPED | OUTPATIENT
Start: 2024-07-02 | End: 2024-07-25

## 2024-07-20 SDOH — HEALTH STABILITY: PHYSICAL HEALTH: ON AVERAGE, HOW MANY MINUTES DO YOU ENGAGE IN EXERCISE AT THIS LEVEL?: 20 MIN

## 2024-07-20 SDOH — HEALTH STABILITY: PHYSICAL HEALTH: ON AVERAGE, HOW MANY DAYS PER WEEK DO YOU ENGAGE IN MODERATE TO STRENUOUS EXERCISE (LIKE A BRISK WALK)?: 2 DAYS

## 2024-07-20 ASSESSMENT — SOCIAL DETERMINANTS OF HEALTH (SDOH): HOW OFTEN DO YOU GET TOGETHER WITH FRIENDS OR RELATIVES?: TWICE A WEEK

## 2024-07-25 ENCOUNTER — ANCILLARY PROCEDURE (OUTPATIENT)
Dept: GENERAL RADIOLOGY | Facility: CLINIC | Age: 29
End: 2024-07-25
Payer: COMMERCIAL

## 2024-07-25 ENCOUNTER — OFFICE VISIT (OUTPATIENT)
Dept: FAMILY MEDICINE | Facility: CLINIC | Age: 29
End: 2024-07-25
Payer: COMMERCIAL

## 2024-07-25 VITALS
SYSTOLIC BLOOD PRESSURE: 122 MMHG | TEMPERATURE: 98.1 F | HEIGHT: 63 IN | HEART RATE: 91 BPM | OXYGEN SATURATION: 98 % | DIASTOLIC BLOOD PRESSURE: 78 MMHG | RESPIRATION RATE: 16 BRPM | WEIGHT: 290.4 LBS | BODY MASS INDEX: 51.45 KG/M2

## 2024-07-25 DIAGNOSIS — F41.1 GAD (GENERALIZED ANXIETY DISORDER): ICD-10-CM

## 2024-07-25 DIAGNOSIS — Z00.00 ROUTINE GENERAL MEDICAL EXAMINATION AT A HEALTH CARE FACILITY: Primary | ICD-10-CM

## 2024-07-25 DIAGNOSIS — Z13.1 SCREENING FOR DIABETES MELLITUS: ICD-10-CM

## 2024-07-25 DIAGNOSIS — F33.1 MAJOR DEPRESSIVE DISORDER, RECURRENT EPISODE, MODERATE (H): ICD-10-CM

## 2024-07-25 DIAGNOSIS — Z30.432 ENCOUNTER FOR IUD REMOVAL: ICD-10-CM

## 2024-07-25 DIAGNOSIS — E66.01 CLASS 3 SEVERE OBESITY DUE TO EXCESS CALORIES WITHOUT SERIOUS COMORBIDITY WITH BODY MASS INDEX (BMI) OF 50.0 TO 59.9 IN ADULT (H): ICD-10-CM

## 2024-07-25 DIAGNOSIS — Z13.220 SCREENING FOR HYPERLIPIDEMIA: ICD-10-CM

## 2024-07-25 DIAGNOSIS — E66.813 CLASS 3 SEVERE OBESITY DUE TO EXCESS CALORIES WITHOUT SERIOUS COMORBIDITY WITH BODY MASS INDEX (BMI) OF 50.0 TO 59.9 IN ADULT (H): ICD-10-CM

## 2024-07-25 LAB
ALBUMIN SERPL BCG-MCNC: 4.2 G/DL (ref 3.5–5.2)
ALP SERPL-CCNC: 92 U/L (ref 40–150)
ALT SERPL W P-5'-P-CCNC: 55 U/L (ref 0–50)
ANION GAP SERPL CALCULATED.3IONS-SCNC: 13 MMOL/L (ref 7–15)
AST SERPL W P-5'-P-CCNC: 28 U/L (ref 0–45)
BASOPHILS # BLD AUTO: 0.1 10E3/UL (ref 0–0.2)
BASOPHILS NFR BLD AUTO: 1 %
BILIRUB SERPL-MCNC: 0.2 MG/DL
BUN SERPL-MCNC: 9.5 MG/DL (ref 6–20)
CALCIUM SERPL-MCNC: 9.6 MG/DL (ref 8.8–10.4)
CHLORIDE SERPL-SCNC: 104 MMOL/L (ref 98–107)
CHOLEST SERPL-MCNC: 181 MG/DL
CREAT SERPL-MCNC: 0.8 MG/DL (ref 0.51–0.95)
EGFRCR SERPLBLD CKD-EPI 2021: >90 ML/MIN/1.73M2
EOSINOPHIL # BLD AUTO: 0.2 10E3/UL (ref 0–0.7)
EOSINOPHIL NFR BLD AUTO: 2 %
ERYTHROCYTE [DISTWIDTH] IN BLOOD BY AUTOMATED COUNT: 13.4 % (ref 10–15)
FASTING STATUS PATIENT QL REPORTED: NO
GLUCOSE SERPL-MCNC: 90 MG/DL (ref 70–99)
GLUCOSE SERPL-MCNC: 90 MG/DL (ref 70–99)
HBA1C MFR BLD: 5.6 % (ref 0–5.6)
HCO3 SERPL-SCNC: 24 MMOL/L (ref 22–29)
HCT VFR BLD AUTO: 42.5 % (ref 35–47)
HDLC SERPL-MCNC: 61 MG/DL
HGB BLD-MCNC: 13.4 G/DL (ref 11.7–15.7)
IMM GRANULOCYTES # BLD: 0.1 10E3/UL
IMM GRANULOCYTES NFR BLD: 1 %
LDLC SERPL CALC-MCNC: 101 MG/DL
LYMPHOCYTES # BLD AUTO: 3.5 10E3/UL (ref 0.8–5.3)
LYMPHOCYTES NFR BLD AUTO: 33 %
MCH RBC QN AUTO: 29.8 PG (ref 26.5–33)
MCHC RBC AUTO-ENTMCNC: 31.5 G/DL (ref 31.5–36.5)
MCV RBC AUTO: 95 FL (ref 78–100)
MONOCYTES # BLD AUTO: 0.7 10E3/UL (ref 0–1.3)
MONOCYTES NFR BLD AUTO: 7 %
NEUTROPHILS # BLD AUTO: 6.2 10E3/UL (ref 1.6–8.3)
NEUTROPHILS NFR BLD AUTO: 58 %
NONHDLC SERPL-MCNC: 120 MG/DL
PLATELET # BLD AUTO: 401 10E3/UL (ref 150–450)
POTASSIUM SERPL-SCNC: 4.4 MMOL/L (ref 3.4–5.3)
PROT SERPL-MCNC: 7.6 G/DL (ref 6.4–8.3)
RBC # BLD AUTO: 4.49 10E6/UL (ref 3.8–5.2)
SODIUM SERPL-SCNC: 141 MMOL/L (ref 135–145)
TRIGL SERPL-MCNC: 96 MG/DL
WBC # BLD AUTO: 10.7 10E3/UL (ref 4–11)

## 2024-07-25 PROCEDURE — 85025 COMPLETE CBC W/AUTO DIFF WBC: CPT

## 2024-07-25 PROCEDURE — 83036 HEMOGLOBIN GLYCOSYLATED A1C: CPT

## 2024-07-25 PROCEDURE — 74018 RADEX ABDOMEN 1 VIEW: CPT | Mod: TC | Performed by: RADIOLOGY

## 2024-07-25 PROCEDURE — 99214 OFFICE O/P EST MOD 30 MIN: CPT | Mod: 25

## 2024-07-25 PROCEDURE — 80053 COMPREHEN METABOLIC PANEL: CPT

## 2024-07-25 PROCEDURE — 99395 PREV VISIT EST AGE 18-39: CPT | Mod: 25

## 2024-07-25 PROCEDURE — 36415 COLL VENOUS BLD VENIPUNCTURE: CPT

## 2024-07-25 PROCEDURE — 80061 LIPID PANEL: CPT

## 2024-07-25 PROCEDURE — 58301 REMOVE INTRAUTERINE DEVICE: CPT

## 2024-07-25 RX ORDER — SERTRALINE HYDROCHLORIDE 100 MG/1
100 TABLET, FILM COATED ORAL DAILY
Qty: 90 TABLET | Refills: 0 | Status: SHIPPED | OUTPATIENT
Start: 2024-07-25

## 2024-07-25 ASSESSMENT — PATIENT HEALTH QUESTIONNAIRE - PHQ9
SUM OF ALL RESPONSES TO PHQ QUESTIONS 1-9: 0
10. IF YOU CHECKED OFF ANY PROBLEMS, HOW DIFFICULT HAVE THESE PROBLEMS MADE IT FOR YOU TO DO YOUR WORK, TAKE CARE OF THINGS AT HOME, OR GET ALONG WITH OTHER PEOPLE: NOT DIFFICULT AT ALL
SUM OF ALL RESPONSES TO PHQ QUESTIONS 1-9: 0
10. IF YOU CHECKED OFF ANY PROBLEMS, HOW DIFFICULT HAVE THESE PROBLEMS MADE IT FOR YOU TO DO YOUR WORK, TAKE CARE OF THINGS AT HOME, OR GET ALONG WITH OTHER PEOPLE: NOT DIFFICULT AT ALL

## 2024-07-25 ASSESSMENT — ANXIETY QUESTIONNAIRES
5. BEING SO RESTLESS THAT IT IS HARD TO SIT STILL: NOT AT ALL
2. NOT BEING ABLE TO STOP OR CONTROL WORRYING: NOT AT ALL
4. TROUBLE RELAXING: NOT AT ALL
1. FEELING NERVOUS, ANXIOUS, OR ON EDGE: NOT AT ALL
3. WORRYING TOO MUCH ABOUT DIFFERENT THINGS: NOT AT ALL
8. IF YOU CHECKED OFF ANY PROBLEMS, HOW DIFFICULT HAVE THESE MADE IT FOR YOU TO DO YOUR WORK, TAKE CARE OF THINGS AT HOME, OR GET ALONG WITH OTHER PEOPLE?: NOT DIFFICULT AT ALL
GAD7 TOTAL SCORE: 0
4. TROUBLE RELAXING: NOT AT ALL
1. FEELING NERVOUS, ANXIOUS, OR ON EDGE: NOT AT ALL
IF YOU CHECKED OFF ANY PROBLEMS ON THIS QUESTIONNAIRE, HOW DIFFICULT HAVE THESE PROBLEMS MADE IT FOR YOU TO DO YOUR WORK, TAKE CARE OF THINGS AT HOME, OR GET ALONG WITH OTHER PEOPLE: NOT DIFFICULT AT ALL
6. BECOMING EASILY ANNOYED OR IRRITABLE: NOT AT ALL
2. NOT BEING ABLE TO STOP OR CONTROL WORRYING: NOT AT ALL
GAD7 TOTAL SCORE: 0
5. BEING SO RESTLESS THAT IT IS HARD TO SIT STILL: NOT AT ALL
7. FEELING AFRAID AS IF SOMETHING AWFUL MIGHT HAPPEN: NOT AT ALL
GAD7 TOTAL SCORE: 0
8. IF YOU CHECKED OFF ANY PROBLEMS, HOW DIFFICULT HAVE THESE MADE IT FOR YOU TO DO YOUR WORK, TAKE CARE OF THINGS AT HOME, OR GET ALONG WITH OTHER PEOPLE?: NOT DIFFICULT AT ALL
IF YOU CHECKED OFF ANY PROBLEMS ON THIS QUESTIONNAIRE, HOW DIFFICULT HAVE THESE PROBLEMS MADE IT FOR YOU TO DO YOUR WORK, TAKE CARE OF THINGS AT HOME, OR GET ALONG WITH OTHER PEOPLE: NOT DIFFICULT AT ALL
7. FEELING AFRAID AS IF SOMETHING AWFUL MIGHT HAPPEN: NOT AT ALL
GAD7 TOTAL SCORE: 0
7. FEELING AFRAID AS IF SOMETHING AWFUL MIGHT HAPPEN: NOT AT ALL
6. BECOMING EASILY ANNOYED OR IRRITABLE: NOT AT ALL
3. WORRYING TOO MUCH ABOUT DIFFERENT THINGS: NOT AT ALL
GAD7 TOTAL SCORE: 0
GAD7 TOTAL SCORE: 0
7. FEELING AFRAID AS IF SOMETHING AWFUL MIGHT HAPPEN: NOT AT ALL

## 2024-07-25 ASSESSMENT — PAIN SCALES - GENERAL: PAINLEVEL: NO PAIN (0)

## 2024-07-25 NOTE — PROGRESS NOTES
Preventive Care Visit  [facility]  ALBA Quintanilla CNP, Nurse Practitioner Primary Care  Jul 25, 2024      Assessment & Plan     Routine general medical examination at a health care facility  Patient is in overall good health and medically stable.     Class 3 severe obesity due to excess calories without serious comorbidity with body mass index (BMI) of 50.0 to 59.9 in adult (H)  Patient wished to discuss weight loss medication. Considered Ozempic or Zetbound in the future with a nutrition consultation. Patient is scheduled to have carpal tunnel surgery in 5 weeks. Discussed returning to clinic following surgery to discuss weight loss medication.     - CBC with platelets and differential; Future  - Comprehensive metabolic panel (BMP + Alb, Alk Phos, ALT, AST, Total. Bili, TP); Future  - CBC with platelets and differential  - Comprehensive metabolic panel (BMP + Alb, Alk Phos, ALT, AST, Total. Bili, TP)    Major depressive disorder, recurrent episode, moderate (H)  Patient reports tolerating Sertraline well and reports she feels it helps with depression. Refilled for one year.    - sertraline (ZOLOFT) 100 MG tablet; Take 1 tablet (100 mg) by mouth daily    ALTAGRACIA (generalized anxiety disorder)  Patient reports tolerating Sertraline well and reports she feels it helps with depression. Refilled for one year.    - sertraline (ZOLOFT) 100 MG tablet; Take 1 tablet (100 mg) by mouth daily    Encounter for IUD removal  Attempted to remove IUD during visit. Was able to locate cervix, was unable to locate IUD. Patient is not currently menstruating. Cervix was without lesions, redness, discharge, or abnormalities.     - Ob/Gyn  Referral; Future    Screening for diabetes mellitus  Patient would like to discuss weight loss, checking for diabetes per previous lab results.     - Glucose; Future  - Hemoglobin A1c; Future  - Glucose  - Hemoglobin A1c    Screening for  "hyperlipidemia  Checking cholesterol due to possible diabetes and due to obesity.    - Lipid panel reflex to direct LDL Fasting; Future  - Lipid panel reflex to direct LDL Fasting    Patient has been advised of split billing requirements and indicates understanding: Yes        Nicotine/Tobacco Cessation  She reports that she has been smoking cigarettes, other, and vaping device. She has a 0.1 pack-year smoking history. She uses smokeless tobacco.    BMI  Estimated body mass index is 52.1 kg/m  as calculated from the following:    Height as of this encounter: 1.59 m (5' 2.6\").    Weight as of this encounter: 131.7 kg (290 lb 6.4 oz).   Weight management plan: Discussed healthy diet and exercise guidelines    Counseling  Appropriate preventive services were addressed with this patient via screening, questionnaire, or discussion as appropriate for fall prevention, nutrition, physical activity, Tobacco-use cessation, weight loss and cognition.  Checklist reviewing preventive services available has been given to the patient.  Reviewed patient's diet, addressing concerns and/or questions.   She is at risk for lack of exercise and has been provided with information to increase physical activity for the benefit of her well-being.   The patient was instructed to see the dentist every 6 months.       CONSULTATION/REFERRAL to OB/Gyn for IUD removal.    Jef Arguello is a 28 year old, presenting for the following:  Annual Visit (Not fasting )        7/25/2024    10:17 AM   Additional Questions   Roomed by Leena [place]        Health Care Directive  Patient does not have a Health Care Directive or Living Will:     HPI  Annual Physical, Not fasting     IUD questions  would like to discuss IUD placement and if IUD patient currently has is placed in correct position     Weight Loss  Would like to discuss weight loss options, including medications      7/20/2024   General Health   How would you rate your overall physical health? (!) " POOR   Feel stress (tense, anxious, or unable to sleep) Not at all            7/20/2024   Nutrition   Three or more servings of calcium each day? (!) NO   Diet: Regular (no restrictions)   How many servings of fruit and vegetables per day? (!) 0-1   How many sweetened beverages each day? (!) 2            7/20/2024   Exercise   Days per week of moderate/strenous exercise 2 days   Average minutes spent exercising at this level 20 min      (!) EXERCISE CONCERN      7/20/2024   Social Factors   Frequency of gathering with friends or relatives Twice a week   Worry food won't last until get money to buy more No   Food not last or not have enough money for food? No   Do you have housing? (Housing is defined as stable permanent housing and does not include staying ouside in a car, in a tent, in an abandoned building, in an overnight shelter, or couch-surfing.) No   Are you worried about losing your housing? No   Lack of transportation? No   Unable to get utilities (heat,electricity)? No   Want help with housing or utility concern? No      (!) HOUSING CONCERN PRESENT      7/20/2024   Dental   Dentist two times every year? (!) NO            7/20/2024   TB Screening   Were you born outside of the [place]? No          Today's PHQ-9 Score:       7/25/2024    10:12 AM   PHQ-9 SCORE   PHQ-9 Total Score MyChart 0   PHQ-9 Total Score 0    0         7/20/2024   Substance Use   Alcohol more than 3/day or more than 7/wk No   Do you use any other substances recreationally? No        Social History     Tobacco Use    Smoking status: Every Day     Current packs/day: 0.01     Average packs/day: (0.1 ttl pk-yrs)     Types: Cigarettes, Other, Vaping Device    Smokeless tobacco: Current    Tobacco comments:     uses E-cig vape , one cartridge per day   Vaping Use    Vaping status: Every Day    Substances: Nicotine, Flavoring    Devices: Pre-filled or refillable cartridge, Refillable tank    Passive vaping exposure: Yes   Substance Use Topics     "Alcohol use: Not Currently     Comment: rare-quit with pregnancy    Drug use: Not Currently     Types: Marijuana     Comment: last use 12/10/2021          Mammogram Screening - Patient under 40 years of age: Routine Mammogram Screening not recommended.         7/20/2024   STI Screening   New sexual partner(s) since last STI/HIV test? No        History of abnormal Pap smear: No - age 21-29 PAP every 3 years recommended        Latest Ref Rng & Units 4/13/2023    12:03 PM 1/30/2019     1:11 PM   PAP / HPV   PAP  Atypical squamous cells of undetermined significance (ASC-US)     PAP (Historical)   NIL    HPV 16 DNA Negative Negative     HPV 18 DNA Negative Negative     Other HR HPV Negative Negative             7/20/2024   Contraception/Family Planning   Questions about contraception or family planning (!) YES Patient reports her partner has had a vasectomy and she would like her IUD removed.            Reviewed and updated as needed this visit by Provider                    Past Medical History:   Diagnosis Date    Anxiety     Depression     Depressive disorder     taking meds    Self-injurious behavior 9/14/2013    Superficial cutting          Review of Systems  Constitutional, HEENT, cardiovascular, pulmonary, GI, , musculoskeletal, neuro, skin, endocrine and psych systems are negative, except as otherwise noted.     Objective    Exam  /78 (BP Location: Right arm, Patient Position: Sitting, Cuff Size: Adult Large)   Pulse 91   Temp 98.1  F (36.7  C) (Tympanic)   Resp 16   Ht 1.59 m (5' 2.6\")   Wt 131.7 kg (290 lb 6.4 oz)   SpO2 98%   BMI 52.10 kg/m     Estimated body mass index is 52.1 kg/m  as calculated from the following:    Height as of this encounter: 1.59 m (5' 2.6\").    Weight as of this encounter: 131.7 kg (290 lb 6.4 oz).    Physical Exam  GENERAL: alert and no distress  EYES: Eyes grossly normal to inspection, PERRL and conjunctivae and sclerae normal  HENT: ear canals and TM's normal, nose " and mouth without ulcers or lesions  NECK: no adenopathy, no asymmetry, masses, or scars  RESP: lungs clear to auscultation - no rales, rhonchi or wheezes  CV: regular rate and rhythm, normal S1 S2, no S3 or S4, no murmur, click or rub, no peripheral edema  ABDOMEN: soft, nontender, no hepatosplenomegaly, no masses and bowel sounds normal  MS: no gross musculoskeletal defects noted, no edema  SKIN: no suspicious lesions or rashes  NEURO: Normal strength and tone, mentation intact and speech normal  PSYCH: mentation appears normal, affect normal/bright        Signed Electronically by: ALBA Quintanilla CNP    Answers submitted by the patient for this visit:  Patient Health Questionnaire (Submitted on 7/25/2024)  If you checked off any problems, how difficult have these problems made it for you to do your work, take care of things at home, or get along with other people?: Not difficult at all  PHQ9 TOTAL SCORE: 0  ALTAGRACIA-7 (Submitted on 7/25/2024)  ALTAGRACIA 7 TOTAL SCORE: 0

## 2024-07-25 NOTE — PATIENT INSTRUCTIONS
Patient Education   Preventive Care Advice   This is general advice given by our system to help you stay healthy. However, your care team may have specific advice just for you. Please talk to your care team about your preventive care needs.  Nutrition  Eat 5 or more servings of fruits and vegetables each day.  Try wheat bread, brown rice and whole grain pasta (instead of white bread, rice, and pasta).  Get enough calcium and vitamin D. Check the label on foods and aim for 100% of the RDA (recommended daily allowance).  Lifestyle  Exercise at least 150 minutes each week  (30 minutes a day, 5 days a week).  Do muscle strengthening activities 2 days a week. These help control your weight and prevent disease.  No smoking.  Wear sunscreen to prevent skin cancer.  Have a dental exam and cleaning every 6 months.  Yearly exams  See your health care team every year to talk about:  Any changes in your health.  Any medicines your care team has prescribed.  Preventive care, family planning, and ways to prevent chronic diseases.  Shots (vaccines)   HPV shots (up to age 26), if you've never had them before.  Hepatitis B shots (up to age 59), if you've never had them before.  COVID-19 shot: Get this shot when it's due.  Flu shot: Get a flu shot every year.  Tetanus shot: Get a tetanus shot every 10 years.  Pneumococcal, hepatitis A, and RSV shots: Ask your care team if you need these based on your risk.  Shingles shot (for age 50 and up)  General health tests  Diabetes screening:  Starting at age 35, Get screened for diabetes at least every 3 years.  If you are younger than age 35, ask your care team if you should be screened for diabetes.  Cholesterol test: At age 39, start having a cholesterol test every 5 years, or more often if advised.  Bone density scan (DEXA): At age 50, ask your care team if you should have this scan for osteoporosis (brittle bones).  Hepatitis C: Get tested at least once in your life.  STIs (sexually  transmitted infections)  Before age 24: Ask your care team if you should be screened for STIs.  After age 24: Get screened for STIs if you're at risk. You are at risk for STIs (including HIV) if:  You are sexually active with more than one person.  You don't use condoms every time.  You or a partner was diagnosed with a sexually transmitted infection.  If you are at risk for HIV, ask about PrEP medicine to prevent HIV.  Get tested for HIV at least once in your life, whether you are at risk for HIV or not.  Cancer screening tests  Cervical cancer screening: If you have a cervix, begin getting regular cervical cancer screening tests starting at age 21.  Breast cancer scan (mammogram): If you've ever had breasts, begin having regular mammograms starting at age 40. This is a scan to check for breast cancer.  Colon cancer screening: It is important to start screening for colon cancer at age 45.  Have a colonoscopy test every 10 years (or more often if you're at risk) Or, ask your provider about stool tests like a FIT test every year or Cologuard test every 3 years.  To learn more about your testing options, visit:   .  For help making a decision, visit:   https://bit.ly/uy48448.  Prostate cancer screening test: If you have a prostate, ask your care team if a prostate cancer screening test (PSA) at age 55 is right for you.  Lung cancer screening: If you are a current or former smoker ages 50 to 80, ask your care team if ongoing lung cancer screenings are right for you.  For informational purposes only. Not to replace the advice of your health care provider. Copyright   2023 [employer]. All rights reserved. Clinically reviewed by the [facility]. [employer] 673291 - REV 01/24.

## 2024-07-26 ENCOUNTER — OFFICE VISIT (OUTPATIENT)
Dept: FAMILY MEDICINE | Facility: CLINIC | Age: 29
End: 2024-07-26
Payer: COMMERCIAL

## 2024-07-26 VITALS
TEMPERATURE: 98 F | WEIGHT: 289 LBS | HEIGHT: 62 IN | DIASTOLIC BLOOD PRESSURE: 85 MMHG | SYSTOLIC BLOOD PRESSURE: 123 MMHG | OXYGEN SATURATION: 96 % | HEART RATE: 105 BPM | BODY MASS INDEX: 53.18 KG/M2 | RESPIRATION RATE: 20 BRPM

## 2024-07-26 DIAGNOSIS — G56.01 RIGHT CARPAL TUNNEL SYNDROME: ICD-10-CM

## 2024-07-26 DIAGNOSIS — E66.01 MORBID OBESITY (H): ICD-10-CM

## 2024-07-26 DIAGNOSIS — Z01.818 PREOP GENERAL PHYSICAL EXAM: Primary | ICD-10-CM

## 2024-07-26 DIAGNOSIS — F41.1 GAD (GENERALIZED ANXIETY DISORDER): ICD-10-CM

## 2024-07-26 DIAGNOSIS — F33.1 MAJOR DEPRESSIVE DISORDER, RECURRENT EPISODE, MODERATE (H): ICD-10-CM

## 2024-07-26 PROCEDURE — 99214 OFFICE O/P EST MOD 30 MIN: CPT | Performed by: FAMILY MEDICINE

## 2024-07-26 NOTE — PATIENT INSTRUCTIONS

## 2024-07-26 NOTE — PROGRESS NOTES
Preoperative Evaluation  [facility]  [address] [address] [facility] [address]  Phone: [phone]  Primary Provider: ALBA Quintanilla CNP  Pre-op Performing Provider: Karla Dumont MD  Jul 26, 2024 7/26/2024   Surgical Information   What procedure is being done? RELEASE, RIGHT CARPAL TUNNEL    Facility or Hospital where procedure/surgery will be performed:   [facility] [place]    Who is doing the procedure / surgery? Harjinder Sofia MD    Date of surgery / procedure: 8/16/24   Time of surgery / procedure: 1155   Where do you plan to recover after surgery? at home with family        Fax number for surgical facility: Note does not need to be faxed, will be available electronically in Epic.    Assessment & Plan     The proposed surgical procedure is considered INTERMEDIATE risk.    (Z01.818) Preop general physical exam  (primary encounter diagnosis)  Comment:   Plan:     (G56.01) Right carpal tunnel syndrome  Comment:   Plan:     (E66.01) Morbid obesity (H)  Comment:   Plan:     (F33.1) Major depressive disorder, recurrent episode, moderate (H)  Comment:   Plan:     (F41.1) ALTAGRACIA (generalized anxiety disorder)  Comment:   Plan:        - No identified additional risk factors other than previously addressed         Recommendation  Approval given to proceed with proposed procedure, without further diagnostic evaluation.    Jef Arguello is a 28 year old, presenting for the following:  Pre-Op Exam          7/26/2024     1:28 PM   Additional Questions   Roomed by DOMO Hernandez   Accompanied by Son- austin BARONE related to upcoming procedure: right hand pain and paresthesias for years, worsening recently with fine motor activities         7/26/2024   Pre-Op Questionnaire   Have you ever had a heart attack or stroke? No   Have you ever had surgery on your heart or blood vessels, such as a stent placement, a coronary artery bypass, or surgery  on an artery in your head, neck, heart, or legs? No   Do you have chest pain with activity? No   Do you have a history of heart failure? No   Do you currently have a cold, bronchitis or symptoms of other infection? No   Do you have a cough, shortness of breath, or wheezing? No   Do you or anyone in your family have previous history of blood clots? No   Do you or does anyone in your family have a serious bleeding problem such as prolonged bleeding following surgeries or cuts? No   Have you ever had problems with anemia or been told to take iron pills? No   Have you had any abnormal blood loss such as black, tarry or bloody stools, or abnormal vaginal bleeding? No   Have you ever had a blood transfusion? No   Are you willing to have a blood transfusion if it is medically needed before, during, or after your surgery? Yes   Have you or any of your relatives ever had problems with anesthesia? No   Do you have sleep apnea, excessive snoring or daytime drowsiness? No   Do you have any artifical heart valves or other implanted medical devices like a pacemaker, defibrillator, or continuous glucose monitor? No   Do you have artificial joints? No   Are you allergic to latex? No        Health Care Directive  Patient does not have a Health Care Directive or Living Will: Discussed advance care planning with patient; however, patient declined at this time.    Preoperative Review of    reviewed - no record of controlled substances prescribed.      Status of Chronic Conditions:  See problem list for active medical problems.  Problems all longstanding and stable, except as noted/documented.  See ROS for pertinent symptoms related to these conditions.    Patient Active Problem List    Diagnosis Date Noted    Routine general medical examination at a health care facility 07/25/2024     Priority: Medium    Encounter for IUD removal 07/25/2024     Priority: Medium    Screening for diabetes mellitus 07/25/2024     Priority: Medium     Screening for hyperlipidemia 2024     Priority: Medium    Right carpal tunnel syndrome 2024     Priority: Medium    Morbid obesity (H) 2023     Priority: Medium    ASCUS of cervix with negative high risk HPV 2023     Priority: Medium     19 NIL  23 ASCUS, neg HPV. Plan: cotest in 3 years      ALTAGRACIA (generalized anxiety disorder) 10/23/2020     Priority: Medium    Other insomnia 10/23/2020     Priority: Medium    IUD (intrauterine device) in place 10/30/2019     Priority: Medium     Mirena IUD placed 10/30/2019        Major depressive disorder, recurrent episode, moderate (H) 2018     Priority: Medium    Bilateral carpal tunnel syndrome 2018     Priority: Medium    Obesity 2014     Priority: Medium    Hyperhidrosis 2013     Priority: Medium      Past Medical History:   Diagnosis Date    Anxiety     Depression     Depressive disorder     taking meds    Self-injurious behavior 2013    Superficial cutting      Past Surgical History:   Procedure Laterality Date     SECTION N/A 2019    Procedure:  SECTION;  Surgeon: Eleanor Gramajo MD;  Location: [facility] OR    LAPAROSCOPIC CHOLECYSTECTOMY N/A 2023    Procedure: CHOLECYSTECTOMY, LAPAROSCOPIC;  Surgeon: Verenice Roche MD;  Location: [facility]    NO HISTORY OF SURGERY       Current Outpatient Medications   Medication Sig Dispense Refill    sertraline (ZOLOFT) 100 MG tablet Take 1 tablet (100 mg) by mouth daily 90 tablet 0       Allergies   Allergen Reactions    Sulfa Antibiotics Other (See Comments)     Red eye from eye drops.  Has never had oral or IV form.        Social History     Tobacco Use    Smoking status: Every Day     Current packs/day: 0.01     Average packs/day: (0.1 ttl pk-yrs)     Types: Cigarettes, Other, Vaping Device    Smokeless tobacco: Current    Tobacco comments:     uses E-cig vape , one cartridge per day   Substance Use Topics    Alcohol use: Not  "Currently     Comment: rare-quit with pregnancy               Review of Systems  Constitutional, neuro, ENT, endocrine, pulmonary, cardiac, gastrointestinal, genitourinary, musculoskeletal, integument and psychiatric systems are negative, except as otherwise noted.    Objective    /85   Pulse 105   Temp 98  F (36.7  C) (Tympanic)   Resp 20   Ht 1.582 m (5' 2.28\")   Wt 131.1 kg (289 lb)   SpO2 96%   BMI 52.38 kg/m     Estimated body mass index is 52.1 kg/m  as calculated from the following:    Height as of 7/25/24: 1.59 m (5' 2.6\").    Weight as of 7/25/24: 131.7 kg (290 lb 6.4 oz).  Physical Exam  GENERAL: alert and no distress  EYES: Eyes grossly normal to inspection, PERRL and conjunctivae and sclerae normal  HENT: ear canals and TM's normal, nose and mouth without ulcers or lesions  NECK: no adenopathy, no asymmetry, masses, or scars  RESP: lungs clear to auscultation - no rales, rhonchi or wheezes  CV: regular rate and rhythm, normal S1 S2, no S3 or S4, no murmur, click or rub, no peripheral edema  ABDOMEN: soft, nontender, no hepatosplenomegaly, no masses and bowel sounds normal  MS: no gross musculoskeletal defects noted, no edema  SKIN: no suspicious lesions or rashes  NEURO: Normal strength and tone, mentation intact and speech normal  PSYCH: mentation appears normal, affect normal/bright    Recent Labs   Lab Test 07/25/24  1143   HGB 13.4         POTASSIUM 4.4   CR 0.80   A1C 5.6        Component      Latest Ref Rng 7/25/2024  11:43 AM   WBC      4.0 - 11.0 10e3/uL 10.7    RBC Count      3.80 - 5.20 10e6/uL 4.49    Hemoglobin      11.7 - 15.7 g/dL 13.4    Hematocrit      35.0 - 47.0 % 42.5    MCV      78 - 100 fL 95    MCH      26.5 - 33.0 pg 29.8    MCHC      31.5 - 36.5 g/dL 31.5    RDW      10.0 - 15.0 % 13.4    Platelet Count      150 - 450 10e3/uL 401    % Neutrophils      % 58    % Lymphocytes      % 33    % Monocytes      % 7    % Eosinophils      % 2    % Basophils      % 1 "    % Immature Granulocytes      % 1    Absolute Neutrophils      1.6 - 8.3 10e3/uL 6.2    Absolute Lymphocytes      0.8 - 5.3 10e3/uL 3.5    Absolute Monocytes      0.0 - 1.3 10e3/uL 0.7    Absolute Eosinophils      0.0 - 0.7 10e3/uL 0.2    Absolute Basophils      0.0 - 0.2 10e3/uL 0.1    Absolute Immature Granulocytes      <=0.4 10e3/uL 0.1    Sodium      135 - 145 mmol/L 141    Potassium      3.4 - 5.3 mmol/L 4.4    Carbon Dioxide (CO2)      22 - 29 mmol/L 24    Anion Gap      7 - 15 mmol/L 13    Urea Nitrogen      6.0 - 20.0 mg/dL 9.5    Creatinine      0.51 - 0.95 mg/dL 0.80    GFR Estimate      >60 mL/min/1.73m2 >90    Calcium      8.8 - 10.4 mg/dL 9.6    Chloride      98 - 107 mmol/L 104    Glucose      70 - 99 mg/dL 90    Glucose      70 - 99 mg/dL 90    Alkaline Phosphatase      40 - 150 U/L 92    AST      0 - 45 U/L 28    ALT      0 - 50 U/L 55 (H)    Protein Total      6.4 - 8.3 g/dL 7.6    Albumin      3.5 - 5.2 g/dL 4.2    Bilirubin Total      <=1.2 mg/dL 0.2    Patient Fasting? No    Patient Fasting? No    Patient Fasting? No    Cholesterol      <200 mg/dL 181    Triglycerides      <150 mg/dL 96    HDL Cholesterol      >=50 mg/dL 61    LDL Cholesterol Calculated      <=100 mg/dL 101 (H)    Non HDL Cholesterol      <130 mg/dL 120    Hemoglobin A1C      0.0 - 5.6 % 5.6       Legend:  (H) High    Diagnostics    No EKG required, no history of coronary heart disease, significant arrhythmia, peripheral arterial disease or other structural heart disease.    Revised Cardiac Risk Index (RCRI)  The patient has the following serious cardiovascular risks for perioperative complications:   - No serious cardiac risks = 0 points     RCRI Interpretation: 0 points: Class I (very low risk - 0.4% complication rate)       Signed Electronically by: Karla Dumont MD  A copy of this evaluation report is provided to the requesting physician.

## 2024-10-14 ENCOUNTER — ANESTHESIA EVENT (OUTPATIENT)
Dept: SURGERY | Facility: CLINIC | Age: 29
End: 2024-10-14
Payer: COMMERCIAL

## 2024-10-14 ENCOUNTER — OFFICE VISIT (OUTPATIENT)
Dept: FAMILY MEDICINE | Facility: CLINIC | Age: 29
End: 2024-10-14
Payer: COMMERCIAL

## 2024-10-14 VITALS
DIASTOLIC BLOOD PRESSURE: 85 MMHG | WEIGHT: 292.2 LBS | OXYGEN SATURATION: 98 % | HEIGHT: 62 IN | RESPIRATION RATE: 16 BRPM | BODY MASS INDEX: 53.77 KG/M2 | SYSTOLIC BLOOD PRESSURE: 131 MMHG | HEART RATE: 102 BPM | TEMPERATURE: 98 F

## 2024-10-14 DIAGNOSIS — Z01.818 PREOP GENERAL PHYSICAL EXAM: Primary | ICD-10-CM

## 2024-10-14 DIAGNOSIS — G56.01 RIGHT CARPAL TUNNEL SYNDROME: ICD-10-CM

## 2024-10-14 PROCEDURE — 99214 OFFICE O/P EST MOD 30 MIN: CPT

## 2024-10-14 NOTE — PROGRESS NOTES
Preoperative Evaluation  [facility]  [address]  [place] [address]  Phone: [phone]  Primary Provider: ALBA Quintanilla CNP  Pre-op Performing Provider: ALBA Miranda CNP  Oct 14, 2024             10/14/2024   Surgical Information   What procedure is being done? Carpal tunnel on right wrist   Facility or Hospital where procedure/surgery will be performed: [facility] OR   Who is doing the procedure / surgery? Harjinder doe   Date of surgery / procedure: 10/16/2024   Time of surgery / procedure: Na   Where do you plan to recover after surgery? at home with family        Fax number for surgical facility: Note does not need to be faxed, will be available electronically in Epic.    Assessment & Plan     The proposed surgical procedure is considered LOW risk.    Preop general physical exam  Reviewed Preparing for Your Surgery with patient along what medications should be held before surgery.     Right carpal tunnel syndrome     - No identified additional risk factors other than previously addressed    Preoperative Medication Instructions  Antiplatelet or Anticoagulation Medication Instructions   - Patient is on no antiplatelet or anticoagulation medications.    Additional Medication Instructions  Take all scheduled medications on the day of surgery   - Herbal medications and vitamins: DO NOT TAKE 14 days prior to surgery.   - ibuprofen (Advil, Motrin): DO NOT TAKE 1 day before surgery.     Recommendation  Approval given to proceed with proposed procedure, without further diagnostic evaluation.    Jef Arguello is a 29 year old, presenting for the following:  Pre-Op Exam          10/14/2024    10:40 AM   Additional Questions   Roomed by gogo leone   Accompanied by boyfriend- Ken BARONE related to upcoming procedure: Right Carpal tunnel        10/14/2024   Pre-Op Questionnaire   Have you ever had a heart attack or stroke? No   Have you ever had surgery on your  heart or blood vessels, such as a stent placement, a coronary artery bypass, or surgery on an artery in your head, neck, heart, or legs? No   Do you have chest pain with activity? No   Do you have a history of heart failure? No   Do you currently have a cold, bronchitis or symptoms of other infection? No   Do you have a cough, shortness of breath, or wheezing? No   Do you or anyone in your family have previous history of blood clots? No   Do you or does anyone in your family have a serious bleeding problem such as prolonged bleeding following surgeries or cuts? No   Have you ever had problems with anemia or been told to take iron pills? No   Have you had any abnormal blood loss such as black, tarry or bloody stools, or abnormal vaginal bleeding? No   Have you ever had a blood transfusion? No   Are you willing to have a blood transfusion if it is medically needed before, during, or after your surgery? Yes   Have you or any of your relatives ever had problems with anesthesia? No   Do you have sleep apnea, excessive snoring or daytime drowsiness? No   Do you have any artifical heart valves or other implanted medical devices like a pacemaker, defibrillator, or continuous glucose monitor? No   Do you have artificial joints? No   Are you allergic to latex? No        Health Care Directive  Patient does not have a Health Care Directive or Living Will: Discussed advance care planning with patient; however, patient declined at this time.    Preoperative Review of    reviewed - no record of controlled substances prescribed.          Patient Active Problem List    Diagnosis Date Noted    Routine general medical examination at a health care facility 07/25/2024     Priority: Medium    Encounter for IUD removal 07/25/2024     Priority: Medium    Screening for diabetes mellitus 07/25/2024     Priority: Medium    Screening for hyperlipidemia 07/25/2024     Priority: Medium    Right carpal tunnel syndrome 05/21/2024      Priority: Medium    Morbid obesity (H) 2023     Priority: Medium    ASCUS of cervix with negative high risk HPV 2023     Priority: Medium     19 NIL  23 ASCUS, neg HPV. Plan: cotest in 3 years      ALTAGRACIA (generalized anxiety disorder) 10/23/2020     Priority: Medium    Other insomnia 10/23/2020     Priority: Medium    IUD (intrauterine device) in place 10/30/2019     Priority: Medium     Mirena IUD placed 10/30/2019        Major depressive disorder, recurrent episode, moderate (H) 2018     Priority: Medium    Bilateral carpal tunnel syndrome 2018     Priority: Medium    Obesity 2014     Priority: Medium    Hyperhidrosis 2013     Priority: Medium      Past Medical History:   Diagnosis Date    Anxiety     Depression     Depressive disorder     taking meds    Self-injurious behavior 2013    Superficial cutting      Past Surgical History:   Procedure Laterality Date     SECTION N/A 2019    Procedure:  SECTION;  Surgeon: Eleanor Gramajo MD;  Location: [facility] OR    LAPAROSCOPIC CHOLECYSTECTOMY N/A 2023    Procedure: CHOLECYSTECTOMY, LAPAROSCOPIC;  Surgeon: Verenice Roche MD;  Location: [facility]    NO HISTORY OF SURGERY       Current Outpatient Medications   Medication Sig Dispense Refill    sertraline (ZOLOFT) 100 MG tablet Take 1 tablet (100 mg) by mouth daily 90 tablet 0       Allergies   Allergen Reactions    Sulfa Antibiotics Other (See Comments)     Red eye from eye drops.  Has never had oral or IV form.        Social History     Tobacco Use    Smoking status: Former     Current packs/day: 0.01     Average packs/day: (0.1 ttl pk-yrs)     Types: Cigarettes, Other    Smokeless tobacco: Current    Tobacco comments:     uses E-cig vape , one cartridge per day   Substance Use Topics    Alcohol use: Not Currently     Comment: rare-quit with pregnancy       History   Drug Use Unknown     Comment: last use 12/10/2021             Review  "of Systems  CONSTITUTIONAL: NEGATIVE for fever, chills, change in weight  INTEGUMENTARY/SKIN: NEGATIVE for worrisome rashes, moles or lesions  EYES: NEGATIVE for vision changes or irritation  ENT/MOUTH: NEGATIVE for ear, mouth and throat problems  RESP: NEGATIVE for significant cough or SOB  CV: NEGATIVE for chest pain, palpitations or peripheral edema  GI: NEGATIVE for nausea, abdominal pain, heartburn, or change in bowel habits  : NEGATIVE for frequency, dysuria, or hematuria  MUSCULOSKELETAL:POSITIVE  for muscle weakness right hand  NEURO: POSITIVE for numbness or tingling right hand/fingers  HEME: NEGATIVE for bleeding problems    Objective    /85 (BP Location: Right arm, Patient Position: Sitting, Cuff Size: Adult Large)   Pulse 102   Temp 98  F (36.7  C) (Oral)   Resp 16   Ht 1.581 m (5' 2.25\")   Wt 132.5 kg (292 lb 3.2 oz)   SpO2 98%   BMI 53.02 kg/m     Estimated body mass index is 53.02 kg/m  as calculated from the following:    Height as of this encounter: 1.581 m (5' 2.25\").    Weight as of this encounter: 132.5 kg (292 lb 3.2 oz).  Physical Exam  GENERAL: alert and no distress  EYES: Eyes grossly normal to inspection, PERRL and conjunctivae and sclerae normal  HENT: normal cephalic/atraumatic, ear canals and TM's normal, oral mucous membranes moist, and tonsillar hypertrophy  NECK: no adenopathy, no asymmetry, masses, or scars  RESP: lungs clear to auscultation - no rales, rhonchi or wheezes  CV: regular rate and rhythm, normal S1 S2, no S3 or S4, no murmur, click or rub, no peripheral edema  ABDOMEN: soft, nontender, no hepatosplenomegaly, no masses and bowel sounds normal  MS: no gross musculoskeletal defects noted, no edema  SKIN: no suspicious lesions or rashes  NEURO: Normal strength and tone, mentation intact and speech normal  PSYCH: mentation appears normal, affect normal/bright    Recent Labs   Lab Test 07/25/24  1143   HGB 13.4         POTASSIUM 4.4   CR 0.80   A1C " 5.6        Diagnostics  No labs were ordered during this visit.   No EKG required, no history of coronary heart disease, significant arrhythmia, peripheral arterial disease or other structural heart disease.    Revised Cardiac Risk Index (RCRI)  The patient has the following serious cardiovascular risks for perioperative complications:   - No serious cardiac risks = 0 points     RCRI Interpretation: 0 points: Class I (very low risk - 0.4% complication rate)         Signed Electronically by: ALBA Miranda CNP  A copy of this evaluation report is provided to the requesting physician.

## 2024-10-14 NOTE — PATIENT INSTRUCTIONS
How to Take Your Medication Before Surgery  Preoperative Medication Instructions   Antiplatelet or Anticoagulation Medication Instructions   - Patient is on no antiplatelet or anticoagulation medications.    Additional Medication Instructions  Take all scheduled medications on the day of surgery   - Herbal medications and vitamins: DO NOT TAKE 14 days prior to surgery.   - ibuprofen (Advil, Motrin): DO NOT TAKE 1 day before surgery.        Patient Education   Preparing for Your Surgery  For Adults  Getting started  In most cases, a nurse will call to review your health history and instructions. They will give you an arrival time based on your scheduled surgery time. Please be ready to share:  Your doctor's clinic name and phone number  Your medical, surgical, and anesthesia history  A list of allergies and sensitivities  A list of medicines, including herbal treatments and over-the-counter drugs  Whether the patient has a legal guardian (ask how to send us the papers in advance)  Note: You may not receive a call if you were seen at our PAC (Preoperative Assessment Center).  Please tell us if you're pregnant--or if there's any chance you might be pregnant. Some surgeries may injure a fetus (unborn baby), so they require a pregnancy test. Surgeries that are safe for a fetus don't always need a test, and you can choose whether to have one.   Preparing for surgery  Within 10 to 30 days of surgery: Have a pre-op exam (sometimes called an H&P, or History and Physical). This can be done at a clinic or pre-operative center.  If you're having a , you may not need this exam. Talk to your care team.  At your pre-op exam, talk to your care team about all medicines you take. (This includes CBD oil and any drugs, such as THC, marijuana, and other forms of cannabis.) If you need to stop any medicine before surgery, ask when to start taking it again.  This is for your safety. Many medicines and drugs can make you bleed too  much during surgery. Some change how well surgery (anesthesia) drugs work.  Call your insurance company to let them know you're having surgery. (If you don't have insurance, call [phone].)  Call your clinic if there's any change in your health. This includes a scrape or scratch near the surgery site, or any signs of a cold (sore throat, runny nose, cough, rash, fever).  Eating and drinking guidelines  For your safety: Unless your surgeon tells you otherwise, follow the guidelines below.  Eat and drink as normal until 8 hours before you arrive for surgery. After that, no food or milk. You can spit out gum when you arrive.  Drink clear liquids until 2 hours before you arrive. These are liquids you can see through, like water, Gatorade, and Propel Water. They also include plain black coffee and tea (no cream or milk).  No alcohol for 24 hours before you arrive. The night before surgery, stop any drinks that contain THC.  If your care team tells you to take medicine on the morning of surgery, it's okay to take it with a sip of water. No other medicines or drugs are allowed (including CBD oil)--follow your care team's instructions.  If you have questions the day of surgery, call your hospital or surgery center.   Preventing infection  Shower or bathe the night before and the morning of surgery. Follow the instructions your clinic gave you. (If no instructions, use regular soap.)  Don't shave or clip hair near your surgery site. We'll remove the hair if needed.  Don't smoke or vape the morning of surgery. No chewing tobacco for 6 hours before you arrive. A nicotine patch is okay. You may spit out nicotine gum when you arrive.  For some surgeries, the surgeon will tell you to fully quit smoking and nicotine.  We will make every effort to keep you safe from infection. We will:  Clean our hands often with soap and water (or an alcohol-based hand rub).  Clean the skin at your surgery site with a special soap that kills  germs.  Give you a special gown to keep you warm. (Cold raises the risk of infection.)  Wear hair covers, masks, gowns, and gloves during surgery.  Give antibiotic medicine, if prescribed. Not all surgeries need this medicine.  What to bring on the day of surgery  Photo ID and insurance card  Copy of your health care directive, if you have one  Glasses and hearing aids (bring cases)  You can't wear contacts during surgery  Inhaler and eye drops, if you use them (tell us about these when you arrive)  CPAP machine or breathing device, if you use them  A few personal items, if spending the night  If you have . . .  A pacemaker, ICD (cardiac defibrillator), or other implant: Bring the ID card.  An implanted stimulator: Bring the remote control.  A legal guardian: Bring a copy of the certified (court-stamped) guardianship papers.  Please remove any jewelry, including body piercings. Leave jewelry and other valuables at home.  If you're going home the day of surgery  You must have a responsible adult drive you home. They should stay with you overnight as well.  If you don't have someone to stay with you, and you aren't safe to go home alone, we may keep you overnight. Insurance often won't pay for this.  After surgery  If it's hard to control your pain or you need more pain medicine, please call your surgeon's office.  Questions?   If you have any questions for your care team, list them here:   ____________________________________________________________________________________________________________________________________________________________________________________________________________________________________________________________  For informational purposes only. Not to replace the advice of your health care provider. Copyright   2003, 2019 [facility]. All rights reserved. Clinically reviewed by Carson Meier MD. [employer] [id number] - REV 08/24.

## 2024-10-16 ENCOUNTER — HOSPITAL ENCOUNTER (OUTPATIENT)
Facility: CLINIC | Age: 29
Discharge: HOME | End: 2024-10-16
Attending: STUDENT IN AN ORGANIZED HEALTH CARE EDUCATION/TRAINING PROGRAM | Admitting: STUDENT IN AN ORGANIZED HEALTH CARE EDUCATION/TRAINING PROGRAM
Payer: COMMERCIAL

## 2024-10-16 ENCOUNTER — ANESTHESIA (OUTPATIENT)
Dept: SURGERY | Facility: CLINIC | Age: 29
End: 2024-10-16
Payer: COMMERCIAL

## 2024-10-16 VITALS
SYSTOLIC BLOOD PRESSURE: 120 MMHG | TEMPERATURE: 98.4 F | HEART RATE: 83 BPM | OXYGEN SATURATION: 96 % | WEIGHT: 291.23 LBS | DIASTOLIC BLOOD PRESSURE: 64 MMHG | BODY MASS INDEX: 53.59 KG/M2 | RESPIRATION RATE: 16 BRPM | HEIGHT: 62 IN

## 2024-10-16 DIAGNOSIS — G56.01 RIGHT CARPAL TUNNEL SYNDROME: Primary | ICD-10-CM

## 2024-10-16 PROCEDURE — 999N000141 HC STATISTIC PRE-PROCEDURE NURSING ASSESSMENT: Performed by: STUDENT IN AN ORGANIZED HEALTH CARE EDUCATION/TRAINING PROGRAM

## 2024-10-16 PROCEDURE — 360N000075 HC SURGERY LEVEL 2, PER MIN: Performed by: STUDENT IN AN ORGANIZED HEALTH CARE EDUCATION/TRAINING PROGRAM

## 2024-10-16 PROCEDURE — 250N000025 HC SEVOFLURANE, PER MIN: Performed by: STUDENT IN AN ORGANIZED HEALTH CARE EDUCATION/TRAINING PROGRAM

## 2024-10-16 PROCEDURE — 710N000012 HC RECOVERY PHASE 2, PER MINUTE: Performed by: STUDENT IN AN ORGANIZED HEALTH CARE EDUCATION/TRAINING PROGRAM

## 2024-10-16 PROCEDURE — 710N000010 HC RECOVERY PHASE 1, LEVEL 2, PER MIN: Performed by: STUDENT IN AN ORGANIZED HEALTH CARE EDUCATION/TRAINING PROGRAM

## 2024-10-16 PROCEDURE — 370N000017 HC ANESTHESIA TECHNICAL FEE, PER MIN: Performed by: STUDENT IN AN ORGANIZED HEALTH CARE EDUCATION/TRAINING PROGRAM

## 2024-10-16 PROCEDURE — 64721 CARPAL TUNNEL SURGERY: CPT

## 2024-10-16 PROCEDURE — 250N000009 HC RX 250

## 2024-10-16 PROCEDURE — 64721 CARPAL TUNNEL SURGERY: CPT | Performed by: STUDENT IN AN ORGANIZED HEALTH CARE EDUCATION/TRAINING PROGRAM

## 2024-10-16 PROCEDURE — 250N000013 HC RX MED GY IP 250 OP 250 PS 637: Performed by: STUDENT IN AN ORGANIZED HEALTH CARE EDUCATION/TRAINING PROGRAM

## 2024-10-16 PROCEDURE — 250N000009 HC RX 250: Performed by: STUDENT IN AN ORGANIZED HEALTH CARE EDUCATION/TRAINING PROGRAM

## 2024-10-16 PROCEDURE — 250N000011 HC RX IP 250 OP 636

## 2024-10-16 PROCEDURE — 258N000003 HC RX IP 258 OP 636

## 2024-10-16 PROCEDURE — 250N000011 HC RX IP 250 OP 636: Performed by: STUDENT IN AN ORGANIZED HEALTH CARE EDUCATION/TRAINING PROGRAM

## 2024-10-16 PROCEDURE — 272N000001 HC OR GENERAL SUPPLY STERILE: Performed by: STUDENT IN AN ORGANIZED HEALTH CARE EDUCATION/TRAINING PROGRAM

## 2024-10-16 RX ORDER — NALOXONE HYDROCHLORIDE 0.4 MG/ML
0.1 INJECTION, SOLUTION INTRAMUSCULAR; INTRAVENOUS; SUBCUTANEOUS
Status: DISCONTINUED | OUTPATIENT
Start: 2024-10-16 | End: 2024-10-16 | Stop reason: HOSPADM

## 2024-10-16 RX ORDER — LIDOCAINE 40 MG/G
CREAM TOPICAL
Status: DISCONTINUED | OUTPATIENT
Start: 2024-10-16 | End: 2024-10-16 | Stop reason: HOSPADM

## 2024-10-16 RX ORDER — HALOPERIDOL 5 MG/ML
1 INJECTION INTRAMUSCULAR
Status: DISCONTINUED | OUTPATIENT
Start: 2024-10-16 | End: 2024-10-16 | Stop reason: HOSPADM

## 2024-10-16 RX ORDER — SODIUM CHLORIDE, SODIUM LACTATE, POTASSIUM CHLORIDE, CALCIUM CHLORIDE 600; 310; 30; 20 MG/100ML; MG/100ML; MG/100ML; MG/100ML
INJECTION, SOLUTION INTRAVENOUS CONTINUOUS PRN
Status: DISCONTINUED | OUTPATIENT
Start: 2024-10-16 | End: 2024-10-16

## 2024-10-16 RX ORDER — SODIUM CHLORIDE, SODIUM LACTATE, POTASSIUM CHLORIDE, CALCIUM CHLORIDE 600; 310; 30; 20 MG/100ML; MG/100ML; MG/100ML; MG/100ML
INJECTION, SOLUTION INTRAVENOUS CONTINUOUS
Status: DISCONTINUED | OUTPATIENT
Start: 2024-10-16 | End: 2024-10-16 | Stop reason: HOSPADM

## 2024-10-16 RX ORDER — HYDROMORPHONE HYDROCHLORIDE 1 MG/ML
0.4 INJECTION, SOLUTION INTRAMUSCULAR; INTRAVENOUS; SUBCUTANEOUS EVERY 5 MIN PRN
Status: DISCONTINUED | OUTPATIENT
Start: 2024-10-16 | End: 2024-10-16 | Stop reason: HOSPADM

## 2024-10-16 RX ORDER — HYDROMORPHONE HYDROCHLORIDE 1 MG/ML
0.2 INJECTION, SOLUTION INTRAMUSCULAR; INTRAVENOUS; SUBCUTANEOUS EVERY 5 MIN PRN
Status: DISCONTINUED | OUTPATIENT
Start: 2024-10-16 | End: 2024-10-16 | Stop reason: HOSPADM

## 2024-10-16 RX ORDER — FENTANYL CITRATE 50 UG/ML
INJECTION, SOLUTION INTRAMUSCULAR; INTRAVENOUS PRN
Status: DISCONTINUED | OUTPATIENT
Start: 2024-10-16 | End: 2024-10-16

## 2024-10-16 RX ORDER — LIDOCAINE HYDROCHLORIDE 20 MG/ML
INJECTION, SOLUTION INFILTRATION; PERINEURAL PRN
Status: DISCONTINUED | OUTPATIENT
Start: 2024-10-16 | End: 2024-10-16

## 2024-10-16 RX ORDER — ONDANSETRON 2 MG/ML
INJECTION INTRAMUSCULAR; INTRAVENOUS PRN
Status: DISCONTINUED | OUTPATIENT
Start: 2024-10-16 | End: 2024-10-16

## 2024-10-16 RX ORDER — PROPOFOL 10 MG/ML
INJECTION, EMULSION INTRAVENOUS PRN
Status: DISCONTINUED | OUTPATIENT
Start: 2024-10-16 | End: 2024-10-16

## 2024-10-16 RX ORDER — ACETAMINOPHEN 325 MG/1
975 TABLET ORAL ONCE
Status: COMPLETED | OUTPATIENT
Start: 2024-10-16 | End: 2024-10-16

## 2024-10-16 RX ORDER — ONDANSETRON 2 MG/ML
4 INJECTION INTRAMUSCULAR; INTRAVENOUS EVERY 30 MIN PRN
Status: DISCONTINUED | OUTPATIENT
Start: 2024-10-16 | End: 2024-10-16 | Stop reason: HOSPADM

## 2024-10-16 RX ORDER — MAGNESIUM HYDROXIDE 1200 MG/15ML
LIQUID ORAL PRN
Status: DISCONTINUED | OUTPATIENT
Start: 2024-10-16 | End: 2024-10-16 | Stop reason: HOSPADM

## 2024-10-16 RX ORDER — LABETALOL HYDROCHLORIDE 5 MG/ML
10 INJECTION, SOLUTION INTRAVENOUS
Status: DISCONTINUED | OUTPATIENT
Start: 2024-10-16 | End: 2024-10-16 | Stop reason: HOSPADM

## 2024-10-16 RX ORDER — ONDANSETRON 4 MG/1
4 TABLET, ORALLY DISINTEGRATING ORAL EVERY 30 MIN PRN
Status: DISCONTINUED | OUTPATIENT
Start: 2024-10-16 | End: 2024-10-16 | Stop reason: HOSPADM

## 2024-10-16 RX ORDER — FENTANYL CITRATE 50 UG/ML
25 INJECTION, SOLUTION INTRAMUSCULAR; INTRAVENOUS EVERY 5 MIN PRN
Status: DISCONTINUED | OUTPATIENT
Start: 2024-10-16 | End: 2024-10-16 | Stop reason: HOSPADM

## 2024-10-16 RX ORDER — FENTANYL CITRATE 50 UG/ML
50 INJECTION, SOLUTION INTRAMUSCULAR; INTRAVENOUS EVERY 5 MIN PRN
Status: DISCONTINUED | OUTPATIENT
Start: 2024-10-16 | End: 2024-10-16 | Stop reason: HOSPADM

## 2024-10-16 RX ORDER — DEXAMETHASONE SODIUM PHOSPHATE 4 MG/ML
4 INJECTION, SOLUTION INTRA-ARTICULAR; INTRALESIONAL; INTRAMUSCULAR; INTRAVENOUS; SOFT TISSUE
Status: DISCONTINUED | OUTPATIENT
Start: 2024-10-16 | End: 2024-10-16 | Stop reason: HOSPADM

## 2024-10-16 RX ORDER — DEXAMETHASONE SODIUM PHOSPHATE 4 MG/ML
INJECTION, SOLUTION INTRA-ARTICULAR; INTRALESIONAL; INTRAMUSCULAR; INTRAVENOUS; SOFT TISSUE PRN
Status: DISCONTINUED | OUTPATIENT
Start: 2024-10-16 | End: 2024-10-16

## 2024-10-16 RX ORDER — BUPIVACAINE HYDROCHLORIDE 5 MG/ML
INJECTION, SOLUTION PERINEURAL PRN
Status: DISCONTINUED | OUTPATIENT
Start: 2024-10-16 | End: 2024-10-16 | Stop reason: HOSPADM

## 2024-10-16 RX ORDER — OXYCODONE HYDROCHLORIDE 5 MG/1
5 TABLET ORAL EVERY 6 HOURS PRN
Qty: 3 TABLET | Refills: 0 | Status: SHIPPED | OUTPATIENT
Start: 2024-10-16 | End: 2024-10-19

## 2024-10-16 RX ORDER — HYDRALAZINE HYDROCHLORIDE 20 MG/ML
2.5-5 INJECTION INTRAMUSCULAR; INTRAVENOUS EVERY 10 MIN PRN
Status: DISCONTINUED | OUTPATIENT
Start: 2024-10-16 | End: 2024-10-16 | Stop reason: HOSPADM

## 2024-10-16 RX ORDER — LIDOCAINE HYDROCHLORIDE AND EPINEPHRINE 10; 10 MG/ML; UG/ML
10 INJECTION, SOLUTION INFILTRATION; PERINEURAL ONCE
Status: DISCONTINUED | OUTPATIENT
Start: 2024-10-16 | End: 2024-10-16 | Stop reason: HOSPADM

## 2024-10-16 RX ORDER — GABAPENTIN 100 MG/1
300 CAPSULE ORAL
Status: COMPLETED | OUTPATIENT
Start: 2024-10-16 | End: 2024-10-16

## 2024-10-16 RX ADMIN — GABAPENTIN 300 MG: 300 CAPSULE ORAL at 10:43

## 2024-10-16 RX ADMIN — MIDAZOLAM 2 MG: 1 INJECTION INTRAMUSCULAR; INTRAVENOUS at 12:05

## 2024-10-16 RX ADMIN — PROPOFOL 100 MG: 10 INJECTION, EMULSION INTRAVENOUS at 12:15

## 2024-10-16 RX ADMIN — PROPOFOL 200 MG: 10 INJECTION, EMULSION INTRAVENOUS at 12:12

## 2024-10-16 RX ADMIN — LIDOCAINE HYDROCHLORIDE 100 MG: 20 INJECTION, SOLUTION INFILTRATION; PERINEURAL at 12:11

## 2024-10-16 RX ADMIN — DEXAMETHASONE SODIUM PHOSPHATE 6 MG: 4 INJECTION, SOLUTION INTRAMUSCULAR; INTRAVENOUS at 12:20

## 2024-10-16 RX ADMIN — ONDANSETRON 4 MG: 2 INJECTION INTRAMUSCULAR; INTRAVENOUS at 12:20

## 2024-10-16 RX ADMIN — PHENYLEPHRINE HYDROCHLORIDE 50 MCG: 10 INJECTION INTRAVENOUS at 12:14

## 2024-10-16 RX ADMIN — ACETAMINOPHEN 975 MG: 325 TABLET ORAL at 10:43

## 2024-10-16 RX ADMIN — PHENYLEPHRINE HYDROCHLORIDE 100 MCG: 10 INJECTION INTRAVENOUS at 12:13

## 2024-10-16 RX ADMIN — PROPOFOL 50 MG: 10 INJECTION, EMULSION INTRAVENOUS at 12:17

## 2024-10-16 RX ADMIN — SODIUM CHLORIDE, POTASSIUM CHLORIDE, SODIUM LACTATE AND CALCIUM CHLORIDE: 600; 310; 30; 20 INJECTION, SOLUTION INTRAVENOUS at 12:04

## 2024-10-16 RX ADMIN — FENTANYL CITRATE 50 MCG: 50 INJECTION INTRAMUSCULAR; INTRAVENOUS at 12:20

## 2024-10-16 RX ADMIN — PHENYLEPHRINE HYDROCHLORIDE 100 MCG: 10 INJECTION INTRAVENOUS at 12:12

## 2024-10-16 RX ADMIN — FENTANYL CITRATE 50 MCG: 50 INJECTION INTRAMUSCULAR; INTRAVENOUS at 12:31

## 2024-10-16 ASSESSMENT — ACTIVITIES OF DAILY LIVING (ADL)
ADLS_ACUITY_SCORE: 29
ADLS_ACUITY_SCORE: 31
ADLS_ACUITY_SCORE: 32
ADLS_ACUITY_SCORE: 32
ADLS_ACUITY_SCORE: 31

## 2024-10-16 ASSESSMENT — LIFESTYLE VARIABLES: TOBACCO_USE: 1

## 2024-10-16 NOTE — ANESTHESIA CARE TRANSFER NOTE
Patient: Saundra Urrutia    Procedure: Procedure(s):  RELEASE, RIGHT CARPAL TUNNEL       Diagnosis: Right carpal tunnel syndrome [G56.01]  Diagnosis Additional Information: No value filed.    Anesthesia Type:   General     Note:    Oropharynx: oropharynx clear of all foreign objects and spontaneously breathing  Level of Consciousness: drowsy and awake  Oxygen Supplementation: face mask  Level of Supplemental Oxygen (L/min / FiO2): 8  Independent Airway: airway patency satisfactory and stable  Dentition: dentition unchanged  Vital Signs Stable: post-procedure vital signs reviewed and stable  Report to RN Given: handoff report given  Patient transferred to: PACU    Handoff Report: Identifed the Patient, Identified the Reponsible Provider, Reviewed the pertinent medical history, Discussed the surgical course, Reviewed Intra-OP anesthesia mangement and issues during anesthesia, Set expectations for post-procedure period and Allowed opportunity for questions and acknowledgement of understanding    Vitals:  CRNA VITALS  10/16/2024 1225 - 10/16/2024 1255        10/16/2024             NIBP: 130/89    Pulse: 93    NIBP Mean: 101    Temp: 35.8  C (96.4  F)    SpO2: 98 %             Electronically Signed By: ALBA Beltre CRNA  October 16, 2024  12:54 PM

## 2024-10-16 NOTE — ANESTHESIA PROCEDURE NOTES
Airway       Patient location during procedure: OR  Staff -        CRNA: Jerry Zavala APRN CRNA       Performed By: CRNA  Consent for Airway        Urgency: elective  Indications and Patient Condition       Indications for airway management: indira-procedural       Induction type:intravenous       Mask difficulty assessment: 0 - not attempted    Final Airway Details       Final airway type: supraglottic airway    Supraglottic Airway Details        Type: LMA       Brand: Air-Q       LMA size: 4    Post intubation assessment        Placement verified by: capnometry, equal breath sounds and chest rise        Number of attempts at approach: 1       Secured with: tape       Ease of procedure: easy       Dentition: Intact and Unchanged

## 2024-10-16 NOTE — ANESTHESIA PREPROCEDURE EVALUATION
Anesthesia Pre-Procedure Evaluation    Patient: Saundra Urrutia   MRN: [id number] : 1995        Procedure : Procedure(s):  RELEASE, RIGHT CARPAL TUNNEL          Past Medical History:   Diagnosis Date    Anxiety     Depression     Depressive disorder     taking meds    Self-injurious behavior 2013    Superficial cutting       Past Surgical History:   Procedure Laterality Date     SECTION N/A 2019    Procedure:  SECTION;  Surgeon: Eleanor Gramajo MD;  Location: [facility] OR    LAPAROSCOPIC CHOLECYSTECTOMY N/A 2023    Procedure: CHOLECYSTECTOMY, LAPAROSCOPIC;  Surgeon: Verenice Roche MD;  Location: [facility] OR    NO HISTORY OF SURGERY        Allergies   Allergen Reactions    Sulfa Antibiotics Other (See Comments)     Red eye from eye drops.  Has never had oral or IV form.      Social History     Tobacco Use    Smoking status: Every Day     Types: Other    Smokeless tobacco: Current    Tobacco comments:     uses E-cig vape , one cartridge per day   Substance Use Topics    Alcohol use: Not Currently     Comment: rare-quit with pregnancy      Wt Readings from Last 1 Encounters:   10/16/24 132.1 kg (291 lb 3.6 oz)        Anesthesia Evaluation   Pt has had prior anesthetic.     No history of anesthetic complications       ROS/MED HX  ENT/Pulmonary:  - neg pulmonary ROS   (+)                tobacco use, Current use,                    (-) sleep apnea   Neurologic:  - neg neurologic ROS     Cardiovascular:  - neg cardiovascular ROS     METS/Exercise Tolerance:     Hematologic:       Musculoskeletal: Comment: Right carpal tunnel syndrome      GI/Hepatic:  - neg GI/hepatic ROS     Renal/Genitourinary:  - neg Renal ROS     Endo: Comment: BMI 53    (+)               Obesity (BMI 45),       Psychiatric/Substance Use: Comment: ALTAGRACIA (generalized anxiety disorder)  Major depressive disorder, recurrent   Insomnia  Hx/o Self-injurious behavior    (+) psychiatric history anxiety and  "depression       Infectious Disease:       Malignancy: Comment: ASCUS of cervix with negative high risk HPV      Other:            Physical Exam    Airway  airway exam normal      Mallampati: I   TM distance: > 3 FB   Neck ROM: full   Mouth opening: > 3 cm    Respiratory Devices and Support         Dental       (+) Completely normal teeth      Cardiovascular   cardiovascular exam normal          Pulmonary   pulmonary exam normal                OUTSIDE LABS:  CBC:   Lab Results   Component Value Date    WBC 10.7 07/25/2024    WBC 11.4 (H) 05/11/2023    HGB 13.4 07/25/2024    HGB 13.3 05/11/2023    HCT 42.5 07/25/2024    HCT 40.3 05/11/2023     07/25/2024     05/11/2023     BMP:   Lab Results   Component Value Date     07/25/2024     05/11/2023    POTASSIUM 4.4 07/25/2024    POTASSIUM 4.3 05/11/2023    CHLORIDE 104 07/25/2024    CHLORIDE 105 05/11/2023    CO2 24 07/25/2024    CO2 23 05/11/2023    BUN 9.5 07/25/2024    BUN 12.8 05/11/2023    CR 0.80 07/25/2024    CR 0.82 05/11/2023    GLC 90 07/25/2024    GLC 90 07/25/2024     COAGS: No results found for: \"PTT\", \"INR\", \"FIBR\"  POC:   Lab Results   Component Value Date    HCG Negative 05/11/2023     HEPATIC:   Lab Results   Component Value Date    ALBUMIN 4.2 07/25/2024    PROTTOTAL 7.6 07/25/2024    ALT 55 (H) 07/25/2024    AST 28 07/25/2024    ALKPHOS 92 07/25/2024    BILITOTAL 0.2 07/25/2024     OTHER:   Lab Results   Component Value Date    A1C 5.6 07/25/2024    STEPHENIE 9.6 07/25/2024    LIPASE 29 05/11/2023    TSH 1.32 05/19/2023    SED 24 (H) 05/19/2023       Anesthesia Plan    ASA Status:  3    NPO Status:  NPO Appropriate    Anesthesia Type: General.     - Airway: LMA   Induction: Intravenous.   Maintenance: Balanced.        Consents    Anesthesia Plan(s) and associated risks, benefits, and realistic alternatives discussed. Questions answered and patient/representative(s) expressed understanding.     - Discussed: Risks, Benefits and " "Alternatives for BOTH SEDATION and the PROCEDURE were discussed     - Discussed with:  Patient      - Extended Intubation/Ventilatory Support Discussed: No.      - Patient is DNR/DNI Status: No     Use of blood products discussed: No .     Postoperative Care    Pain management: IV analgesics, Multi-modal analgesia.   PONV prophylaxis: Ondansetron (or other 5HT-3), Background Propofol Infusion, Dexamethasone or Solumedrol     Comments:               Leny Monte MD    I have reviewed the pertinent notes and labs in the chart from the past 30 days and (re)examined the patient.  Any updates or changes from those notes are reflected in this note.                       # Severe Obesity: Estimated body mass index is 53.27 kg/m  as calculated from the following:    Height as of this encounter: 1.575 m (5' 2\").    Weight as of this encounter: 132.1 kg (291 lb 3.6 oz).             "

## 2024-10-16 NOTE — DISCHARGE INSTRUCTIONS
Procedure Performed: Right carpal tunnel release  Attending Surgeon: Harjinder Sofia MD  Date: 10/16/2024    DIAGNOSIS  1. Right carpal tunnel syndrome        MEDICATIONS   Resume all home medications as directed unless otherwise instructed during this hospitalization. If there is any question, double check with your primary care provider.  Start new discharge medications as directed.    Take 1 tablet of 650 mg Tylenol (acetaminophen) Arthritis Strength (extended release) and 1 tablet of Aleve (naproxen) 220 mg in the morning with breakfast and in the evening with dinner.     For breakthrough pain use narcotic pain medication as prescribed.    Do not drive or operate machinery while taking narcotic pain medications.   If you are taking other Tylenol containing medicines at home, be sure NOT to exceed 4 gram's (4000 milligrams) of Tylenol per day.   If you are taking pain medications, be sure to take Colace (docusate sodium) as well to prevent constipation. If constipated, try adding another cathartic or enema.  If nausea and vomiting, call the hospital or seek medical attention.    ACTIVITY   Weight bearin lb coffee cup weight bearing to operative extremity    DIET  Resume same diet prior to your hospital admission.    WOUND   Leave dressing on until you are seen in clinic for your follow up visit.   Watch for signs and symptoms of infection of your wounds including; pain, redness, swelling, drainage or fever.  If you notice any of these symptoms please call or seek medical attention.    Keep wound clean, dry, and intact.  Do not submerge wounds in water until they are healed. No baths, soaking, swimming, or prolonged water exposure for 4 weeks after surgery.    RETURN   Follow-up with Orthopedic Clinic as directed.     Future Appointments   Date Time Provider Department Center   10/25/2024  1:00 PM Rita Black PA-C [facility]       Call the [facility] at [phone] during  business hours for any symptoms such as:    * Fevers with Temperature greater than 101.5 degrees.   * Pus drainage from wound site.   * Severe pain, not controlled by medication.   * Persistent nausea, vomiting and inablility to tolerate fluids.    If you are receiving care in [place], you may call the Orthopedic clinic at [phone].    FOR URGENT PROBLEMS ONLY, after hours or on weekends call the hospital  at [phone] and ask to speak with the orthopedic resident on call.    Last tylenol at 1043. Next tylenol at 4:43 PM

## 2024-10-16 NOTE — BRIEF OP NOTE
[facility] Brief Operative Note    Pre-operative diagnosis: Right carpal tunnel syndrome [G56.01]   Post-operative diagnosis Same   Procedure: Procedure(s):  RELEASE, RIGHT CARPAL TUNNEL   Surgeon(s): Surgeons and Role:     * Harjinder Sofia MD - Primary   Estimated blood loss: 1cc   Specimens: * No specimens in log *   Findings: See op report       Harjinder Sofia MD    Hand & Upper Extremity Surgery  Department of Orthopedic Surgery  [facility]

## 2024-10-16 NOTE — OP NOTE
Patient: Saundra Urrutia  : 1995  MRN: [id number]    DATE OF OPERATION: 2024      OPERATIVE REPORT       PREOPERATIVE DIAGNOSIS:  Right carpal tunnel syndrome     POSTOPERATIVE DIAGNOSIS:  Right carpal tunnel syndrome     PROCEDURE:  Right open carpal tunnel release    SURGEON:  Harjinder Sofia MD     ASSISTANT(S):  None    ANESTHESIA:  Local + General  Local: 10cc 1:1 mixture 1% lidocaine  and 0.5% bupivacaine     IMPLANTS:   None    ESTIMATED BLOOD LOSS:  1cc    DVT PROPHYLAXIS:  SCDs    TOURNIQUET TIME:  6 minutes    SPECIMENS REMOVED:  None    INTRAOPERATIVE FINDINGS:  Compressed median nerve at the carpal tunnel. Recurrent motor branch extraligamentous and radial    COMPLICATIONS:  None    DISPOSITION:  Stable to PACU.     INDICATIONS:  Saundra Urrutia is a 29 year old female with a history of right hand numbness and tingling refractory to conservative measures. Electrodiagnostic studies demonstrated evidence of carpal tunnel syndrome.     Indications for surgery were discussed with the patient in detail. After discussing risks, benefits and alternatives to procedure, the patient elected to proceed with surgical intervention.     The patient understood that risks include, but are not limited to: Bleeding, infection, damage to surrounding structures (such as nerve, vessel or tendon), persistent symptoms or numbness, need for additional procedures, pillar pain, stiffness, need for therapy, and anesthetic complications. The patient expressed understanding and agreement and wished to proceed.     Consent was obtained for the procedure.     DESCRIPTION OF PROCEDURE IN DETAIL:  The patient was seen in the preoperative care unit. Patient identity, consent, procedure to be performed, and operative site were verified with the patient. The right upper extremity was marked.     The patient was brought to the operating room and placed supine on the operating room table. The right upper extremity was placed  on an armboard. SCDs were placed on the bilateral lower extremities.      General anesthesia was induced.      The right upper extremity was prepped and draped in the usual sterile fashion. A timeout was performed confirming the correct patient, procedure, and operative site. All were in agreement.    10cc of local anesthetic was infiltrated in the skin and subcutaneous tissues over the carpal tunnel. Limb was exsanguinated and tourniquet inflated to 250 mmHg. A standard longitudinal 2.5cm incision was made in the skin directly over the carpal tunnel.  Blunt dissection was carried down through the skin and subcutaneous tissues to the superficial palmar fascia.  Hemostasis was achieved with bipolar cautery.  The superficial palmar fascia was divided sharply exposing the transverse carpal ligament. The transverse carpal ligament was then divided sharply and released in its entirety, exposing the median nerve which appeared pale and compressed. Distally, the superficial palmar fascia was divided with scissors under direct visualization, protecting the superficial palmar arch below. Attention was then turned proximally, and the remaining transverse carpal ligament and antebrachial fascia were released under direct visualization. A full median nerve release was performed. The median nerve had excellent excursion at the end of the case. The recurrent motor branch was noted to be extraligamentous and radial.    The wound was copiously irrigated. The incision was then closed with interrupted, buried 4-0 Monocryl sutures. A steri-strip was applied. A sterile, bulky soft dressing was placed.     All needle and sponge counts were correct at the end of the procedure. There were no complications.     The patient was awoken from anesthesia and taken to the postoperative recovery unit in stable condition.     I was present and scrubbed for the entire procedure.     POSTOPERATIVE PLAN:  The patient will be discharged home. The  patient was instructed to be 1 lb coffee cup weight bearing. The patient was instructed on finger range of motion exercises. The dressing will remain in place until follow-up. The patient was instructed to keep it clean and dry. The patient will return to clinic in 10 days for a wound check.    Harjinder Sofia MD     Hand, Upper Extremity & Microvascular Surgery  Department of Orthopedic Surgery  [facility]

## 2024-10-18 NOTE — ANESTHESIA POSTPROCEDURE EVALUATION
Patient: Saundra Urrutia    Procedure: Procedure(s):  Right carpal tunnel release       Anesthesia Type:  General    Note:  Disposition: Outpatient   Postop Pain Control: Uneventful            Sign Out: Well controlled pain   PONV: No   Neuro/Psych: Uneventful            Sign Out: Acceptable/Baseline neuro status   Airway/Respiratory: Uneventful            Sign Out: Acceptable/Baseline resp. status   CV/Hemodynamics: Uneventful            Sign Out: Acceptable CV status; No obvious hypovolemia; No obvious fluid overload   Other NRE: NONE   DID A NON-ROUTINE EVENT OCCUR? No           Last vitals:  Vitals Value Taken Time   /87 10/16/24 1330   Temp 36.3  C (97.3  F) 10/16/24 1315   Pulse 84 10/16/24 1335   Resp 16 10/16/24 1345   SpO2 94 % 10/16/24 1354   Vitals shown include unfiled device data.    Electronically Signed By: Lupe Sullivan MD  October 18, 2024  7:23 AM

## 2024-10-25 ENCOUNTER — OFFICE VISIT (OUTPATIENT)
Dept: ORTHOPEDICS | Facility: CLINIC | Age: 29
End: 2024-10-25
Payer: COMMERCIAL

## 2024-10-25 DIAGNOSIS — G56.03 BILATERAL CARPAL TUNNEL SYNDROME: Primary | ICD-10-CM

## 2024-10-25 PROCEDURE — 99024 POSTOP FOLLOW-UP VISIT: CPT | Performed by: PHYSICIAN ASSISTANT

## 2024-10-25 NOTE — LETTER
[facility] [address]  [address] [facility] [address]  [phone]          October 25, 2024    RE:  Saundra Urrutia                                                                                                                                                       [address]  [address]  [place] [facility] 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     Sincerely,    Rita Black PA-C

## 2024-10-25 NOTE — PROGRESS NOTES
Date of Service: Oct 25, 2024    Reason for visit: Postoperative follow-up    Date of Surgery: 10/16/24    Procedure Performed: right open carpal tunnel release    Interval events: Saundra Urrutia comes to see me in follow-up today. Numbness and tingling present preoperatively IS improved. No fevers or chills. No longer taking pain medication. No other issues to report.    Physical examination:  Well-developed, well-nourished and in no acute distress.  Alert and oriented to surroundings.  On examination of the right wrist, incision is well-approximated. There is no erythema, drainage, or dehiscence. Sensation is intact in median, radial and ulnar nerve distributions. Thumb opposition is intact. Patient can actively flex and extend all digits and thumb. Interosseous muscles, EPL, and FDP-2 fire. Fingers are warm and well-perfused.     Assessment: Progressing appropriately following the above procedure    Plan: Sutures will be removed today and Steri-Strips applied. Patient should continue to wash wound with soap and water daily and pat dry. Steri-Strips will fall off on their own. No immersing the wound in water for prolonged periods such as tub baths or dishwashing without gloves until wound has healed. Do not lift anything heavier than a coffee cup or do forceful gripping with your operative hand until the wound has healed as this may split the wound open. This will typically take 1-2 more weeks. You can use the hand for light activities like eating, shaving, typing, and brushing your teeth. After the wound has completley healed, you may progress your activity gradually according to your comfort level, but heavy lifting (> 10 pounds),  forceful gripping, and weight bearing directly on the palm (such as pushups) are discouraged for the first 6 weeks after surgery to give the area time to heal. Painful activities should be avoided.     Patient would like to proceed with scheduling the LEFT. Case request placed.      YVONNE BOOGIE PA-C  Orthopaedic Surgery

## 2024-10-25 NOTE — NURSING NOTE
Reason For Visit:   Chief Complaint   Patient presents with    Surgical Followup     Post op Right open carpal tunnel release  DOS: 10/16/24       Primary MD: Shivani Garza  Ref. MD: Willie    Age: 29 year old    ?  No      There were no vitals taken for this visit.      Pain Assessment  Patient Currently in Pain: No (patient denies any right wrist pain)    Hand Dominance Evaluation  Hand Dominance: Right          QuickDASH Assessment      5/20/2024    12:12 PM   QuickDASH Main   1. Open a tight or new jar Moderate difficulty   2. Do heavy household chores (e.g., wash walls, floors) Moderate difficulty   3. Carry a shopping bag or briefcase Moderate difficulty   4. Wash your back Moderate difficulty   5. Use a knife to cut food Mild difficulty   6. Recreational activities in which you take some force or impact through your arm, shoulder or hand (e.g., golf, hammering, tennis, etc.) Severe difficulty   7. During the past week, to what extent has your arm, shoulder or hand problem interfered with your normal social activities with family, friends, neighbours or groups Slightly   8. During the past week, were you limited in your work or other regular daily activities as a result of your arm, shoulder or hand problem Slightly limited   9. Arm, shoulder or hand pain Severe   10.Tingling (pins and needles) in your arm,shoulder or hand Extreme   11. During the past week, how much difficulty have you had sleeping because of the pain in your arm, shoulder or hand Moderate difficulty   Quickdash Ability Score 52.27          Current Outpatient Medications   Medication Sig Dispense Refill    multivitamin CF FORMULA (CHOICEFUL) chewable tablet Take 1 tablet by mouth daily.      sertraline (ZOLOFT) 100 MG tablet Take 1 tablet (100 mg) by mouth daily 90 tablet 0       Allergies   Allergen Reactions    Sulfa Antibiotics Other (See Comments)     Red eye from eye drops.  Has never had oral or IV form.       JUN ACEVEDO,  ATC

## 2024-10-25 NOTE — LETTER
10/25/2024      Saundra Urrutia  [address]  [address]  [place] [address]      Dear Colleague,    Thank you for referring your patient, Saundra Urrutia, to the [facility] [place]. Please see a copy of my visit note below.    Date of Service: Oct 25, 2024    Reason for visit: Postoperative follow-up    Date of Surgery: 10/16/24    Procedure Performed: right open carpal tunnel release    Interval events: Saundra Urrutia comes to see me in follow-up today. Numbness and tingling present preoperatively IS improved. No fevers or chills. No longer taking pain medication. No other issues to report.    Physical examination:  Well-developed, well-nourished and in no acute distress.  Alert and oriented to surroundings.  On examination of the right wrist, incision is well-approximated. There is no erythema, drainage, or dehiscence. Sensation is intact in median, radial and ulnar nerve distributions. Thumb opposition is intact. Patient can actively flex and extend all digits and thumb. Interosseous muscles, EPL, and FDP-2 fire. Fingers are warm and well-perfused.     Assessment: Progressing appropriately following the above procedure    Plan: Sutures will be removed today and Steri-Strips applied. Patient should continue to wash wound with soap and water daily and pat dry. Steri-Strips will fall off on their own. No immersing the wound in water for prolonged periods such as tub baths or dishwashing without gloves until wound has healed. Do not lift anything heavier than a coffee cup or do forceful gripping with your operative hand until the wound has healed as this may split the wound open. This will typically take 1-2 more weeks. You can use the hand for light activities like eating, shaving, typing, and brushing your teeth. After the wound has completley healed, you may progress your activity gradually according to your comfort level, but heavy lifting (> 10 pounds),  forceful gripping, and  weight bearing directly on the palm (such as pushups) are discouraged for the first 6 weeks after surgery to give the area time to heal. Painful activities should be avoided.     Patient would like to proceed with scheduling the LEFT. Case request placed.     RITA BOOGIE PA-C  Orthopaedic Surgery       Again, thank you for allowing me to participate in the care of your patient.        Sincerely,        Rita Boogie PA-C

## 2024-11-26 ENCOUNTER — TELEPHONE (OUTPATIENT)
Dept: ORTHOPEDICS | Facility: CLINIC | Age: 29
End: 2024-11-26
Payer: COMMERCIAL

## 2025-02-14 ENCOUNTER — MYC REFILL (OUTPATIENT)
Dept: FAMILY MEDICINE | Facility: CLINIC | Age: 30
End: 2025-02-14
Payer: COMMERCIAL

## 2025-02-14 DIAGNOSIS — F33.1 MAJOR DEPRESSIVE DISORDER, RECURRENT EPISODE, MODERATE (H): ICD-10-CM

## 2025-02-14 DIAGNOSIS — F41.1 GAD (GENERALIZED ANXIETY DISORDER): ICD-10-CM

## 2025-02-18 RX ORDER — SERTRALINE HYDROCHLORIDE 100 MG/1
100 TABLET, FILM COATED ORAL DAILY
Qty: 90 TABLET | Refills: 0 | Status: SHIPPED | OUTPATIENT
Start: 2025-02-18

## 2025-03-06 ENCOUNTER — VIRTUAL VISIT (OUTPATIENT)
Dept: FAMILY MEDICINE | Facility: CLINIC | Age: 30
End: 2025-03-06

## 2025-03-06 DIAGNOSIS — F33.1 MAJOR DEPRESSIVE DISORDER, RECURRENT EPISODE, MODERATE (H): ICD-10-CM

## 2025-03-06 DIAGNOSIS — F41.1 GAD (GENERALIZED ANXIETY DISORDER): ICD-10-CM

## 2025-03-06 RX ORDER — SERTRALINE HYDROCHLORIDE 100 MG/1
100 TABLET, FILM COATED ORAL DAILY
Qty: 90 TABLET | Refills: 3 | Status: SHIPPED | OUTPATIENT
Start: 2025-03-06

## 2025-03-06 RX ORDER — SERTRALINE HYDROCHLORIDE 100 MG/1
100 TABLET, FILM COATED ORAL DAILY
Qty: 90 TABLET | Refills: 0 | Status: CANCELLED | OUTPATIENT
Start: 2025-03-06

## 2025-03-06 ASSESSMENT — ANXIETY QUESTIONNAIRES
GAD7 TOTAL SCORE: 0
6. BECOMING EASILY ANNOYED OR IRRITABLE: NOT AT ALL
7. FEELING AFRAID AS IF SOMETHING AWFUL MIGHT HAPPEN: NOT AT ALL
5. BEING SO RESTLESS THAT IT IS HARD TO SIT STILL: NOT AT ALL
GAD7 TOTAL SCORE: 0
2. NOT BEING ABLE TO STOP OR CONTROL WORRYING: NOT AT ALL
4. TROUBLE RELAXING: NOT AT ALL
GAD7 TOTAL SCORE: 0
7. FEELING AFRAID AS IF SOMETHING AWFUL MIGHT HAPPEN: NOT AT ALL
3. WORRYING TOO MUCH ABOUT DIFFERENT THINGS: NOT AT ALL
8. IF YOU CHECKED OFF ANY PROBLEMS, HOW DIFFICULT HAVE THESE MADE IT FOR YOU TO DO YOUR WORK, TAKE CARE OF THINGS AT HOME, OR GET ALONG WITH OTHER PEOPLE?: NOT DIFFICULT AT ALL
1. FEELING NERVOUS, ANXIOUS, OR ON EDGE: NOT AT ALL
IF YOU CHECKED OFF ANY PROBLEMS ON THIS QUESTIONNAIRE, HOW DIFFICULT HAVE THESE PROBLEMS MADE IT FOR YOU TO DO YOUR WORK, TAKE CARE OF THINGS AT HOME, OR GET ALONG WITH OTHER PEOPLE: NOT DIFFICULT AT ALL

## 2025-03-06 ASSESSMENT — PATIENT HEALTH QUESTIONNAIRE - PHQ9
SUM OF ALL RESPONSES TO PHQ QUESTIONS 1-9: 1
SUM OF ALL RESPONSES TO PHQ QUESTIONS 1-9: 1
10. IF YOU CHECKED OFF ANY PROBLEMS, HOW DIFFICULT HAVE THESE PROBLEMS MADE IT FOR YOU TO DO YOUR WORK, TAKE CARE OF THINGS AT HOME, OR GET ALONG WITH OTHER PEOPLE: NOT DIFFICULT AT ALL

## 2025-03-06 NOTE — PROGRESS NOTES
"Saundra is a 29 year old who is being evaluated via a billable video visit.    How would you like to obtain your AVS? MyChart  If the video visit is dropped, the invitation should be resent by: Text to cell phone: [phone]  Will anyone else be joining your video visit? No      Assessment & Plan     Major depressive disorder, recurrent episode, moderate (H)  Patient reports she increased her Sertraline independently to 150 mg daily due to feeling a little lost. Patient denies any unpleasant side effects. Patient reports she is feeling better and she does not have any thoughts of wanting to hurt self or others and identifies with reasons for living.     - sertraline (ZOLOFT) 100 MG tablet; Take 1 tablet (100 mg) by mouth daily.  - sertraline (ZOLOFT) 50 MG tablet; Take 1 tablet (50 mg) by mouth daily.    ALTAGRACIA (generalized anxiety disorder)  Patient reports she increased her Sertraline independently to 150 mg daily due to feeling a little lost. Patient denies any unpleasant side effects. Patient reports she is feeling better and she does not have any thoughts of wanting to hurt self or others and identifies with reasons for living.      - sertraline (ZOLOFT) 100 MG tablet; Take 1 tablet (100 mg) by mouth daily.  - sertraline (ZOLOFT) 50 MG tablet; Take 1 tablet (50 mg) by mouth daily    BMI  Estimated body mass index is 53.27 kg/m  as calculated from the following:    Height as of 10/16/24: 1.575 m (5' 2\").    Weight as of 10/16/24: 132.1 kg (291 lb 3.6 oz).   Weight management plan: Discussed healthy diet and exercise guidelines      Work on weight loss    Subjective   Saundra is a 29 year old, presenting for the following health issues:  No chief complaint on file.        10/14/2024    10:40 AM   Additional Questions   Roomed by gogo leone   Accompanied by boyfriend- Ken     Patient reports she increased her Sertraline independently to 150 mg daily due to feeling a little lost. Patient denies any unpleasant side " effects. Patient reports she is feeling better and she does not have any thoughts of wanting to hurt self or others and identifies with reasons for living. Screening scores have improved. Patient advised to follow up in July with annual prevent visit.       History of Present Illness       Mental Health Follow-up:  Patient presents to follow-up on Depression & Anxiety.Patient's depression since last visit has been:  Better  The patient is not having other symptoms associated with depression.  Patient's anxiety since last visit has been:  Better  The patient is not having other symptoms associated with anxiety.  Any significant life events: No  Patient is not feeling anxious or having panic attacks.  Patient has no concerns about alcohol or drug use.    She eats 2-3 servings of fruits and vegetables daily.She consumes 3 sweetened beverage(s) daily.She exercises with enough effort to increase her heart rate 9 or less minutes per day.  She exercises with enough effort to increase her heart rate 3 or less days per week.   She is taking medications regularly.        Depression and Anxiety   How are you doing with your depression since your last visit? Improved Increased the zoloft to 150mg and has been on that for a month which has improved things. The 100mg was not helping her and she was having a hard time. PHQ-9 and ALTAGRACIA-7 would have been rated much differenly.   How are you doing with your anxiety since your last visit?  Improved Increased the zoloft to 150mg and has been on that for a month which has improved things. The 100mg was not helping her and she was having a hard time. PHQ-9 and ALTAGRACIA-7 would have been rated much differenly.   Are you having other symptoms that might be associated with depression or anxiety? No  Have you had a significant life event? No   Do you have any concerns with your use of alcohol or other drugs? No    Social History     Tobacco Use    Smoking status: Every Day     Types: Other     Smokeless tobacco: Never    Tobacco comments:     uses E-cig vape , one cartridge per day   Vaping Use    Vaping status: Every Day    Substances: Nicotine, Flavoring    Devices: Pre-filled or refillable cartridge, Refillable tank    Passive vaping exposure: Yes   Substance Use Topics    Alcohol use: Not Currently     Comment: rare-quit with pregnancy    Drug use: Not Currently     Types: Marijuana     Comment: last use 12/10/2021         4/13/2023    11:24 AM 7/25/2024    10:12 AM 3/6/2025     9:32 AM   PHQ   PHQ-9 Total Score 0 0    0 1    Q9: Thoughts of better off dead/self-harm past 2 weeks Not at all Not at all Not at all        Proxy-reported         4/13/2023    11:24 AM 7/25/2024    10:12 AM 3/6/2025     9:33 AM   ALTAGRACIA-7 SCORE   Total Score  0 (minimal anxiety) 0 (minimal anxiety)   Total Score 0 0    0 0        Proxy-reported         3/6/2025     9:32 AM   Last PHQ-9   1.  Little interest or pleasure in doing things 1    2.  Feeling down, depressed, or hopeless 0    3.  Trouble falling or staying asleep, or sleeping too much 0    4.  Feeling tired or having little energy 0    5.  Poor appetite or overeating 0    6.  Feeling bad about yourself 0    7.  Trouble concentrating 0    8.  Moving slowly or restless 0    Q9: Thoughts of better off dead/self-harm past 2 weeks 0    PHQ-9 Total Score 1        Proxy-reported         3/6/2025     9:33 AM   ALTAGRACIA-7    1. Feeling nervous, anxious, or on edge 0    2. Not being able to stop or control worrying 0    3. Worrying too much about different things 0    4. Trouble relaxing 0    5. Being so restless that it is hard to sit still 0    6. Becoming easily annoyed or irritable 0    7. Feeling afraid, as if something awful might happen 0    ALTAGRACIA-7 Total Score 0    If you checked any problems, how difficult have they made it for you to do your work, take care of things at home, or get along with other people? Not difficult at all        Proxy-reported       Review of  Systems  CONSTITUTIONAL: NEGATIVE for fever, chills, change in weight  ENT/MOUTH: NEGATIVE for ear, mouth and throat problems  RESP: NEGATIVE for significant cough or SOB  CV: NEGATIVE for chest pain, palpitations or peripheral edema      Objective    Vitals - Patient Reported  Pain Score: No Pain (0)        Physical Exam   GENERAL: alert and no distress  EYES: Eyes grossly normal to inspection.  No discharge or erythema, or obvious scleral/conjunctival abnormalities.  RESP: No audible wheeze, cough, or visible cyanosis.    SKIN: Visible skin clear. No significant rash, abnormal pigmentation or lesions.  NEURO: Cranial nerves grossly intact.  Mentation and speech appropriate for age.  PSYCH: Appropriate affect, tone, and pace of words      Video-Visit Details    Type of service:  Video Visit   Originating Location (pt. Location): Home    Distant Location (provider location):  On-site  Platform used for Video Visit: Zoom (Telehealth)  Signed Electronically by: ALBA Quintanilla CNP

## 2025-07-07 ENCOUNTER — PATIENT OUTREACH (OUTPATIENT)
Dept: CARE COORDINATION | Facility: CLINIC | Age: 30
End: 2025-07-07
Payer: COMMERCIAL

## 2025-07-08 ENCOUNTER — APPOINTMENT (OUTPATIENT)
Dept: RADIOLOGY | Facility: HOSPITAL | Age: 30
End: 2025-07-08
Attending: EMERGENCY MEDICINE
Payer: COMMERCIAL

## 2025-07-08 ENCOUNTER — HOSPITAL ENCOUNTER (EMERGENCY)
Facility: HOSPITAL | Age: 30
Discharge: HOME | End: 2025-07-08
Attending: EMERGENCY MEDICINE
Payer: COMMERCIAL

## 2025-07-08 VITALS
HEIGHT: 62 IN | HEART RATE: 89 BPM | SYSTOLIC BLOOD PRESSURE: 108 MMHG | RESPIRATION RATE: 20 BRPM | DIASTOLIC BLOOD PRESSURE: 62 MMHG | BODY MASS INDEX: 51.53 KG/M2 | TEMPERATURE: 98.5 F | WEIGHT: 280 LBS | OXYGEN SATURATION: 95 %

## 2025-07-08 DIAGNOSIS — S93.401A SPRAIN OF RIGHT ANKLE, UNSPECIFIED LIGAMENT, INITIAL ENCOUNTER: ICD-10-CM

## 2025-07-08 PROCEDURE — 99283 EMERGENCY DEPT VISIT LOW MDM: CPT | Performed by: EMERGENCY MEDICINE

## 2025-07-08 PROCEDURE — 250N000013 HC RX MED GY IP 250 OP 250 PS 637: Performed by: EMERGENCY MEDICINE

## 2025-07-08 PROCEDURE — 73610 X-RAY EXAM OF ANKLE: CPT | Mod: RT

## 2025-07-08 RX ADMIN — IBUPROFEN 800 MG: 600 TABLET ORAL at 06:51

## 2025-07-08 ASSESSMENT — COLUMBIA-SUICIDE SEVERITY RATING SCALE - C-SSRS
6. HAVE YOU EVER DONE ANYTHING, STARTED TO DO ANYTHING, OR PREPARED TO DO ANYTHING TO END YOUR LIFE?: NO
1. IN THE PAST MONTH, HAVE YOU WISHED YOU WERE DEAD OR WISHED YOU COULD GO TO SLEEP AND NOT WAKE UP?: NO
2. HAVE YOU ACTUALLY HAD ANY THOUGHTS OF KILLING YOURSELF IN THE PAST MONTH?: NO

## 2025-07-08 NOTE — DISCHARGE INSTRUCTIONS
Does not show any signs of fracture.  Tylenol 1000 mg 4 times daily as needed for pain.  Ibuprofen 800 mg 3 times daily as needed for pain.  Use the Aircast, crutches as needed, weight-bear as tolerated.

## 2025-07-08 NOTE — ED TRIAGE NOTES
"Pt accidentally fell down 2 stairs this am. Pt denies LOC or head strike. Pt heard \"crunch\" in right ankle and has ankle pain.        "

## 2025-07-08 NOTE — ED PROVIDER NOTES
EMERGENCY DEPARTMENT ENCOUNTER      NAME: Saundra Urrutia  AGE: 29 year old female  YOB: 1995  MRN: [id number]  EVALUATION DATE & TIME: No admission date for patient encounter.    PCP: Shivani Garza    ED PROVIDER: Michelle Anne MD      Chief Complaint   Patient presents with    Fall         FINAL IMPRESSION:  1. Sprain of right ankle, unspecified ligament, initial encounter          ED COURSE & MEDICAL DECISION MAKING:    Pertinent Labs & Imaging studies reviewed. (See chart for details)  29 year old female with history of depression/anxiety who presents to the Emergency Department for evaluation of right sided ankle pain after she fell down 2 steps, describes an inversion type injury to the right ankle and felt a pop.  On examination patient has pain, swelling to the right lateral malleolus.  Most suspicious for ankle fracture versus sprain.  No tenderness palpation in the foot, calcaneus or head of the fibula for associated injury.    Patient initially seen evaluate by myself in triage area due to boarding crisis.  Given ibuprofen for pain, ice applied.  X-ray of the right ankle obtained, negative.  Given Aircast, crutches to help with symptom control, encouraged to weight-bear as tolerated.  Discharged home.    ED Course as of 07/08/25 0823   Tue Jul 08, 2025   0720 Ankle XR, G/E 3 views, right  XR Independently interpreted by myself no visualized fracture       Medical Decision Making  I obtained history from Family Member/Significant Other  I reviewed the EMR: Outpatient Record: 3/6/2025 clinic visit  Discharge. No recommendations on prescription strength medication(s). See documentation for any additional details.    MIPS (CTPE, Dental pain, Graves, Sinusitis, Asthma/COPD, Head Trauma): Not Applicable    SEPSIS: None          At the conclusion of the encounter I discussed the results of all of the tests and the disposition. The questions were answered. The patient or family acknowledged  understanding and was agreeable with the care plan.      MEDICATIONS GIVEN IN THE EMERGENCY:  Medications   ibuprofen (ADVIL/MOTRIN) tablet 800 mg (800 mg Oral $Given 25 6497)       NEW PRESCRIPTIONS STARTED AT TODAY'S ER VISIT  New Prescriptions    No medications on file          =================================================================    HPI    Patient information was obtained from: patient     Use of Intrepreter: N/A      Saundra Urrutia is a 29 year old female with pertinent medical history of anxiety, depression, obesity, hyperhidrosis who presents with right ankle pain.     The patient reports that when she was walking down the stairs at 6:00 AM (30 minutes ago) she fell down a couple of stares and twisted her right ankle. The patient notes that when she twisted her ankle, she heard a crunching noise. The right ankle pain tingles up into her lower leg and into her toes, but the pain is in the ankle.     Denies hitter her head, back pain, neck pain, or any other concerns at this time..       PAST MEDICAL HISTORY:  Past Medical History:   Diagnosis Date    Anxiety     Depression     Depressive disorder     taking meds    Self-injurious behavior 2013    Superficial cutting        PAST SURGICAL HISTORY:  Past Surgical History:   Procedure Laterality Date     SECTION N/A 2019    Procedure:  SECTION;  Surgeon: Eleanor Gramajo MD;  Location: [facility] OR    LAPAROSCOPIC CHOLECYSTECTOMY N/A 2023    Procedure: CHOLECYSTECTOMY, LAPAROSCOPIC;  Surgeon: Verenice Roche MD;  Location: [facility] [place] [facility]    NO HISTORY OF SURGERY      RELEASE CARPAL TUNNEL Right 10/16/2024    Procedure: Right carpal tunnel release;  Surgeon: Harjinder Sofia MD;  Location: [facility] OR           CURRENT MEDICATIONS:    Cannot display prior to admission medications because the patient has not been admitted in this contact.       ALLERGIES:  Allergies   Allergen Reactions    Sulfa Antibiotics Other (See  "Comments)     Red eye from eye drops.  Has never had oral or IV form.       FAMILY HISTORY:  Family History   Problem Relation Age of Onset    Diabetes Paternal Grandfather     Cancer Other     Depression Maternal Grandmother     Depression Mother     Substance Abuse Mother         recovered  A&D    Diabetes Father     Depression Father     Heart Disease Father     Substance Abuse Father         A&D    Breast Cancer Paternal Grandmother     C.A.D. No family hx of        SOCIAL HISTORY:  Social History     Tobacco Use    Smoking status: Every Day     Types: Other    Smokeless tobacco: Never    Tobacco comments:     uses E-cig vape , one cartridge per day   Vaping Use    Vaping status: Every Day    Substances: Nicotine, Flavoring    Devices: Pre-filled or refillable cartridge, Refillable tank    Passive vaping exposure: Yes   Substance Use Topics    Alcohol use: Not Currently     Comment: rare-quit with pregnancy    Drug use: Not Currently     Types: Marijuana     Comment: last use 12/10/2021        VITALS:  Patient Vitals for the past 24 hrs:   BP Temp Temp src Pulse Resp SpO2 Height Weight   07/08/25 0641 108/62 98.5  F (36.9  C) Oral 89 20 95 % 1.575 m (5' 2\") 127 kg (280 lb)       PHYSICAL EXAM    General Appearance: Well-appearing female in no acute distress.   Cardio:  Regular rate and rhythm  Pulm:  Respirations unlabored  Abdomen: Obese  Extremities: Pain and swelling to right lateral malleolus tenderness lesser in anterior ankle. No tenderness in foot, calcaneus, and head of the fibula. Otherwise extremities are atraumatic  Skin:  Skin color, texture, turgor normal, no rashes or lesions  Neuro: Awake, alert and oriented     RADIOLOGY/LAB:  Reviewed all pertinent imaging. Please see official radiology report.  All pertinent labs reviewed and interpreted.  Results for orders placed or performed during the hospital encounter of 07/08/25   Ankle XR, G/E 3 views, right    Impression    IMPRESSION: No acute " displaced right ankle fracture or joint malalignment. No significant ankle or hindfoot joint space narrowing. Os trigonum. Inferior medial malleolar spur. Mild lateral malleolus ankle soft tissue swelling.       The creation of this record is based on the scribe s observations of the work being performed by Michelle Anne MD and the provider s statements to them. It was created on her behalf by Judy Gamez, a trained medical scribe. This document has been checked and approved by the attending provider.    Michelle Anne MD  Emergency Medicine  [facility] EMERGENCY DEPARTMENT  [facility] [facility] [address]  [phone]  Dept: [phone]     Michelle Anne MD  07/08/25 0876

## (undated) DEVICE — TOURNIQUET CUFF 18" REPRO RED 60-7070-103

## (undated) DEVICE — CATH TRAY FOLEY SURESTEP 16FR W/URINE MTR STATLK LF A303416A

## (undated) DEVICE — SOL WATER IRRIG 1000ML BOTTLE 07139-09

## (undated) DEVICE — DRSG ABDOMINAL 07 1/2X8" 7197D

## (undated) DEVICE — BARRIER SEPRAFILM 5X6" SINGLE SHEET 4301-02

## (undated) DEVICE — BANDAGE ADH LF 1X3 ABN3100A

## (undated) DEVICE — LINEN ORTHO PACK 5446

## (undated) DEVICE — DECANTER VIAL 2006S

## (undated) DEVICE — ENDO TROCAR FIRST ENTRY KII FIOS Z-THRD 05X100MM CTF03

## (undated) DEVICE — ENDO SHEARS RENEW LAP ENDOCUT SCISSOR TIP 16.5MM 3142

## (undated) DEVICE — Device

## (undated) DEVICE — CUSTOM PACK LAP CHOLE SBA5BLCHEA

## (undated) DEVICE — PREP SKIN SCRUB TRAY 4461A

## (undated) DEVICE — SU MONOCRYL+ 4-0 18IN PS2 UND MCP496G

## (undated) DEVICE — SOL WATER IRRIG 1000ML BOTTLE 2F7114

## (undated) DEVICE — GLOVE BIOGEL PI ORTHOPRO SZ 7.5 47675

## (undated) DEVICE — PREP CHLORAPREP 26ML TINTED HI-LITE ORANGE 930815

## (undated) DEVICE — LABEL MEDICATION SYSTEM  3304

## (undated) DEVICE — SU MONOCRYL 0 CT-1 36" T352H

## (undated) DEVICE — TUBING SMOKE EVAC PNEUMOCLEAR HIGH FLOW 0620050250

## (undated) DEVICE — LINEN BABY BLANKET 5434

## (undated) DEVICE — SU PLAIN 3-0 CT 27" 852H

## (undated) DEVICE — STOCKING SLEEVE COMPRESSION CALF MED

## (undated) DEVICE — PLATE GROUNDING ADULT W/CORD 9165L

## (undated) DEVICE — DRSG STERI STRIP 1/2X4" R1547

## (undated) DEVICE — SUCTION MANIFOLD NEPTUNE 2 SYS 4 PORT 0702-020-000

## (undated) DEVICE — SUCTION KIWI VAC  VAC-6000M

## (undated) DEVICE — WARMER SCOPE DISPOSABLE DLW510

## (undated) DEVICE — SOL NACL 0.9% IRRIG 1000ML BOTTLE 07138-09

## (undated) DEVICE — SU VICRYL 0 CT-1 36" J946H

## (undated) DEVICE — DRAPE STERI TOWEL LG 1010

## (undated) DEVICE — SU VICRYL 2-0 CT-1 36" UND J945H

## (undated) DEVICE — SOL NACL 0.9% IRRIG 1000ML BOTTLE 2F7124

## (undated) DEVICE — GLOVE BIOGEL PI MICRO INDICATOR UNDERGLOVE SZ 7.5 48975

## (undated) DEVICE — CLIP APPLIER ENDO ROTATING 10MM MED/LG ER320

## (undated) DEVICE — GLOVE BIOGEL PI ULTRATOUCH G SZ 7.0 42170

## (undated) DEVICE — SU VICRYL 4-0 FS-2 27" J422-H

## (undated) DEVICE — COVER CAMERA IN-LIGHT DISP LT-C02

## (undated) DEVICE — ADH SKIN CLOSURE PREMIERPRO EXOFIN 1.0ML 3470

## (undated) DEVICE — BNDG ELASTIC 3"X5YDS UNSTERILE 6611-30

## (undated) DEVICE — ENDO TROCAR SLEEVE KII Z-THREADED 05X100MM CTS02

## (undated) DEVICE — GLOVE PROTEXIS W/NEU-THERA 7.0  2D73TE70

## (undated) DEVICE — BLADE CLIPPER 4406

## (undated) DEVICE — STRAP KNEE/BODY 31143004

## (undated) DEVICE — PACK C-SECTION LF PL15OTA83B

## (undated) DEVICE — ENDO TROCAR FIRST ENTRY KII FIOS Z-THRD 11X100MM CTF33

## (undated) RX ORDER — PROPOFOL 10 MG/ML
INJECTION, EMULSION INTRAVENOUS
Status: DISPENSED
Start: 2023-05-11

## (undated) RX ORDER — ONDANSETRON 2 MG/ML
INJECTION INTRAMUSCULAR; INTRAVENOUS
Status: DISPENSED
Start: 2023-05-11

## (undated) RX ORDER — FENTANYL CITRATE 50 UG/ML
INJECTION, SOLUTION INTRAMUSCULAR; INTRAVENOUS
Status: DISPENSED
Start: 2024-10-16

## (undated) RX ORDER — DEXAMETHASONE SODIUM PHOSPHATE 10 MG/ML
INJECTION, SOLUTION INTRAMUSCULAR; INTRAVENOUS
Status: DISPENSED
Start: 2023-05-11

## (undated) RX ORDER — PROPOFOL 10 MG/ML
INJECTION, EMULSION INTRAVENOUS
Status: DISPENSED
Start: 2024-10-16

## (undated) RX ORDER — PHENYLEPHRINE HCL IN 0.9% NACL 1 MG/10 ML
SYRINGE (ML) INTRAVENOUS
Status: DISPENSED
Start: 2019-08-20

## (undated) RX ORDER — OXYTOCIN/0.9 % SODIUM CHLORIDE 30/500 ML
PLASTIC BAG, INJECTION (ML) INTRAVENOUS
Status: DISPENSED
Start: 2019-08-20

## (undated) RX ORDER — ACETAMINOPHEN 325 MG/1
TABLET ORAL
Status: DISPENSED
Start: 2024-10-16

## (undated) RX ORDER — BUPIVACAINE HYDROCHLORIDE 2.5 MG/ML
INJECTION, SOLUTION EPIDURAL; INFILTRATION; INTRACAUDAL
Status: DISPENSED
Start: 2023-05-11

## (undated) RX ORDER — ATROPINE SULFATE 0.4 MG/ML
AMPUL (ML) INJECTION
Status: DISPENSED
Start: 2019-08-20

## (undated) RX ORDER — BUPIVACAINE HYDROCHLORIDE 5 MG/ML
INJECTION, SOLUTION EPIDURAL; INTRACAUDAL
Status: DISPENSED
Start: 2024-10-16

## (undated) RX ORDER — GABAPENTIN 300 MG/1
CAPSULE ORAL
Status: DISPENSED
Start: 2024-10-16

## (undated) RX ORDER — FENTANYL CITRATE-0.9 % NACL/PF 10 MCG/ML
PLASTIC BAG, INJECTION (ML) INTRAVENOUS
Status: DISPENSED
Start: 2024-10-16

## (undated) RX ORDER — LIDOCAINE HCL/EPINEPHRINE/PF 2%-1:200K
VIAL (ML) INJECTION
Status: DISPENSED
Start: 2019-08-20

## (undated) RX ORDER — ESMOLOL HYDROCHLORIDE 10 MG/ML
INJECTION INTRAVENOUS
Status: DISPENSED
Start: 2023-05-11

## (undated) RX ORDER — CEFAZOLIN SODIUM 1 G/3ML
INJECTION, POWDER, FOR SOLUTION INTRAMUSCULAR; INTRAVENOUS
Status: DISPENSED
Start: 2024-10-16

## (undated) RX ORDER — FENTANYL CITRATE 50 UG/ML
INJECTION, SOLUTION INTRAMUSCULAR; INTRAVENOUS
Status: DISPENSED
Start: 2023-05-11

## (undated) RX ORDER — FENTANYL CITRATE 50 UG/ML
INJECTION, SOLUTION INTRAMUSCULAR; INTRAVENOUS
Status: DISPENSED
Start: 2019-08-20

## (undated) RX ORDER — LIDOCAINE HYDROCHLORIDE 10 MG/ML
INJECTION, SOLUTION EPIDURAL; INFILTRATION; INTRACAUDAL; PERINEURAL
Status: DISPENSED
Start: 2024-10-16